# Patient Record
Sex: FEMALE | Race: WHITE | URBAN - METROPOLITAN AREA
[De-identification: names, ages, dates, MRNs, and addresses within clinical notes are randomized per-mention and may not be internally consistent; named-entity substitution may affect disease eponyms.]

---

## 2018-11-20 ENCOUNTER — OUTPATIENT (OUTPATIENT)
Dept: OUTPATIENT SERVICES | Facility: HOSPITAL | Age: 83
LOS: 1 days | Discharge: HOME | End: 2018-11-20

## 2018-11-20 ENCOUNTER — RESULT REVIEW (OUTPATIENT)
Age: 83
End: 2018-11-20

## 2018-11-20 VITALS — SYSTOLIC BLOOD PRESSURE: 156 MMHG | RESPIRATION RATE: 18 BRPM | HEART RATE: 98 BPM | DIASTOLIC BLOOD PRESSURE: 99 MMHG

## 2018-11-20 VITALS
DIASTOLIC BLOOD PRESSURE: 94 MMHG | RESPIRATION RATE: 18 BRPM | SYSTOLIC BLOOD PRESSURE: 128 MMHG | WEIGHT: 121.92 LBS | HEART RATE: 96 BPM | TEMPERATURE: 97 F | HEIGHT: 63 IN

## 2018-11-20 DIAGNOSIS — Z98.890 OTHER SPECIFIED POSTPROCEDURAL STATES: Chronic | ICD-10-CM

## 2018-11-20 DIAGNOSIS — Z90.49 ACQUIRED ABSENCE OF OTHER SPECIFIED PARTS OF DIGESTIVE TRACT: Chronic | ICD-10-CM

## 2018-11-20 NOTE — PRE-ANESTHESIA EVALUATION ADULT - NSANTHOSAYNRD_GEN_A_CORE
No. MARQUIS screening performed.  STOP BANG Legend: 0-2 = LOW Risk; 3-4 = INTERMEDIATE Risk; 5-8 = HIGH Risk

## 2018-11-20 NOTE — ASU DISCHARGE PLAN (ADULT/PEDIATRIC). - NOTIFY
Persistent Nausea and Vomiting/Excessive Diarrhea/Pain not relieved by Medications/Bleeding that does not stop/Fever greater than 101/Inability to Tolerate Liquids or Foods/Swelling that continues/Unable to Urinate

## 2018-11-21 LAB — SURGICAL PATHOLOGY STUDY: SIGNIFICANT CHANGE UP

## 2018-11-26 DIAGNOSIS — R63.4 ABNORMAL WEIGHT LOSS: ICD-10-CM

## 2018-11-26 DIAGNOSIS — I10 ESSENTIAL (PRIMARY) HYPERTENSION: ICD-10-CM

## 2018-11-26 DIAGNOSIS — K64.8 OTHER HEMORRHOIDS: ICD-10-CM

## 2018-11-26 DIAGNOSIS — K20.9 ESOPHAGITIS, UNSPECIFIED: ICD-10-CM

## 2018-11-26 DIAGNOSIS — I48.91 UNSPECIFIED ATRIAL FIBRILLATION: ICD-10-CM

## 2018-11-26 DIAGNOSIS — K64.4 RESIDUAL HEMORRHOIDAL SKIN TAGS: ICD-10-CM

## 2018-11-26 DIAGNOSIS — Z79.01 LONG TERM (CURRENT) USE OF ANTICOAGULANTS: ICD-10-CM

## 2018-11-26 DIAGNOSIS — K29.40 CHRONIC ATROPHIC GASTRITIS WITHOUT BLEEDING: ICD-10-CM

## 2018-11-26 DIAGNOSIS — K59.00 CONSTIPATION, UNSPECIFIED: ICD-10-CM

## 2018-11-26 DIAGNOSIS — F41.9 ANXIETY DISORDER, UNSPECIFIED: ICD-10-CM

## 2018-11-26 DIAGNOSIS — E03.9 HYPOTHYROIDISM, UNSPECIFIED: ICD-10-CM

## 2019-09-23 NOTE — ASU PREOP CHECKLIST - BSA (M2)
Problem: Stroke: Ischemic (Transient/Permanent)  Intervention: # Provide Stroke Education Packet within 24 hrs: supplement as needed  Stroke Education Packet provided within 24 hours of admission         1.57

## 2020-09-21 PROBLEM — F41.9 ANXIETY DISORDER, UNSPECIFIED: Chronic | Status: ACTIVE | Noted: 2018-11-20

## 2020-09-21 PROBLEM — E03.9 HYPOTHYROIDISM, UNSPECIFIED: Chronic | Status: ACTIVE | Noted: 2018-11-20

## 2020-09-21 PROBLEM — I10 ESSENTIAL (PRIMARY) HYPERTENSION: Chronic | Status: ACTIVE | Noted: 2018-11-20

## 2020-09-21 PROBLEM — I48.91 UNSPECIFIED ATRIAL FIBRILLATION: Chronic | Status: ACTIVE | Noted: 2018-11-20

## 2020-09-29 PROBLEM — Z00.00 ENCOUNTER FOR PREVENTIVE HEALTH EXAMINATION: Status: ACTIVE | Noted: 2020-09-29

## 2020-11-09 ENCOUNTER — APPOINTMENT (OUTPATIENT)
Dept: NEUROLOGY | Facility: CLINIC | Age: 85
End: 2020-11-09

## 2022-10-15 ENCOUNTER — INPATIENT (INPATIENT)
Facility: HOSPITAL | Age: 87
LOS: 4 days | Discharge: HOME | End: 2022-10-20
Attending: STUDENT IN AN ORGANIZED HEALTH CARE EDUCATION/TRAINING PROGRAM | Admitting: STUDENT IN AN ORGANIZED HEALTH CARE EDUCATION/TRAINING PROGRAM

## 2022-10-15 VITALS
HEART RATE: 76 BPM | DIASTOLIC BLOOD PRESSURE: 84 MMHG | SYSTOLIC BLOOD PRESSURE: 167 MMHG | RESPIRATION RATE: 18 BRPM | HEIGHT: 63 IN | TEMPERATURE: 97 F | OXYGEN SATURATION: 97 %

## 2022-10-15 DIAGNOSIS — Z98.890 OTHER SPECIFIED POSTPROCEDURAL STATES: Chronic | ICD-10-CM
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
## 2022-10-15 DIAGNOSIS — E03.9 HYPOTHYROIDISM, UNSPECIFIED: ICD-10-CM

## 2022-10-15 DIAGNOSIS — I10 ESSENTIAL (PRIMARY) HYPERTENSION: ICD-10-CM

## 2022-10-15 DIAGNOSIS — G62.9 POLYNEUROPATHY, UNSPECIFIED: ICD-10-CM

## 2022-10-15 DIAGNOSIS — Z88.8 ALLERGY STATUS TO OTHER DRUGS, MEDICAMENTS AND BIOLOGICAL SUBSTANCES STATUS: ICD-10-CM

## 2022-10-15 DIAGNOSIS — Z90.49 ACQUIRED ABSENCE OF OTHER SPECIFIED PARTS OF DIGESTIVE TRACT: ICD-10-CM

## 2022-10-15 DIAGNOSIS — S22.41XA MULTIPLE FRACTURES OF RIBS, RIGHT SIDE, INITIAL ENCOUNTER FOR CLOSED FRACTURE: ICD-10-CM

## 2022-10-15 DIAGNOSIS — M17.0 BILATERAL PRIMARY OSTEOARTHRITIS OF KNEE: ICD-10-CM

## 2022-10-15 DIAGNOSIS — Z79.01 LONG TERM (CURRENT) USE OF ANTICOAGULANTS: ICD-10-CM
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
## 2022-10-15 DIAGNOSIS — F41.9 ANXIETY DISORDER, UNSPECIFIED: ICD-10-CM

## 2022-10-15 DIAGNOSIS — Z90.49 ACQUIRED ABSENCE OF OTHER SPECIFIED PARTS OF DIGESTIVE TRACT: Chronic | ICD-10-CM
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
## 2022-10-15 DIAGNOSIS — S06.6X1A TRAUMATIC SUBARACHNOID HEMORRHAGE WITH LOSS OF CONSCIOUSNESS OF 30 MINUTES OR LESS, INITIAL ENCOUNTER: ICD-10-CM

## 2022-10-15 DIAGNOSIS — E87.1 HYPO-OSMOLALITY AND HYPONATREMIA: ICD-10-CM

## 2022-10-15 DIAGNOSIS — I48.20 CHRONIC ATRIAL FIBRILLATION, UNSPECIFIED: ICD-10-CM

## 2022-10-15 DIAGNOSIS — D46.9 MYELODYSPLASTIC SYNDROME, UNSPECIFIED: ICD-10-CM

## 2022-10-15 DIAGNOSIS — Z79.890 HORMONE REPLACEMENT THERAPY: ICD-10-CM

## 2022-10-15 DIAGNOSIS — G50.0 TRIGEMINAL NEURALGIA: ICD-10-CM

## 2022-10-15 LAB
ALBUMIN SERPL ELPH-MCNC: 3.8 G/DL — SIGNIFICANT CHANGE UP (ref 3.5–5.2)
ALP SERPL-CCNC: 123 U/L — HIGH (ref 30–115)
ALT FLD-CCNC: 17 U/L — SIGNIFICANT CHANGE UP (ref 0–41)
ANION GAP SERPL CALC-SCNC: 13 MMOL/L — SIGNIFICANT CHANGE UP (ref 7–14)
APPEARANCE UR: ABNORMAL
APTT BLD: 29.6 SEC — SIGNIFICANT CHANGE UP (ref 27–39.2)
AST SERPL-CCNC: 43 U/L — HIGH (ref 0–41)
BACTERIA # UR AUTO: ABNORMAL
BASOPHILS # BLD AUTO: 0.02 K/UL — SIGNIFICANT CHANGE UP (ref 0–0.2)
BASOPHILS # BLD AUTO: 0.02 K/UL — SIGNIFICANT CHANGE UP (ref 0–0.2)
BASOPHILS NFR BLD AUTO: 1 % — SIGNIFICANT CHANGE UP (ref 0–1)
BASOPHILS NFR BLD AUTO: 1.1 % — HIGH (ref 0–1)
BILIRUB SERPL-MCNC: 0.8 MG/DL — SIGNIFICANT CHANGE UP (ref 0.2–1.2)
BILIRUB UR-MCNC: NEGATIVE — SIGNIFICANT CHANGE UP
BLD GP AB SCN SERPL QL: SIGNIFICANT CHANGE UP
BUN SERPL-MCNC: 6 MG/DL — LOW (ref 10–20)
CALCIUM SERPL-MCNC: 8.5 MG/DL — SIGNIFICANT CHANGE UP (ref 8.4–10.5)
CHLORIDE SERPL-SCNC: 80 MMOL/L — LOW (ref 98–110)
CO2 SERPL-SCNC: 27 MMOL/L — SIGNIFICANT CHANGE UP (ref 17–32)
COLOR SPEC: YELLOW — SIGNIFICANT CHANGE UP
CREAT SERPL-MCNC: <0.5 MG/DL — LOW (ref 0.7–1.5)
DIFF PNL FLD: ABNORMAL
EGFR: 97 ML/MIN/1.73M2 — SIGNIFICANT CHANGE UP
EOSINOPHIL # BLD AUTO: 0.03 K/UL — SIGNIFICANT CHANGE UP (ref 0–0.7)
EOSINOPHIL # BLD AUTO: 0.07 K/UL — SIGNIFICANT CHANGE UP (ref 0–0.7)
EOSINOPHIL NFR BLD AUTO: 1.6 % — SIGNIFICANT CHANGE UP (ref 0–8)
EOSINOPHIL NFR BLD AUTO: 3.5 % — SIGNIFICANT CHANGE UP (ref 0–8)
EPI CELLS # UR: 1 /HPF — SIGNIFICANT CHANGE UP (ref 0–5)
GLUCOSE SERPL-MCNC: 99 MG/DL — SIGNIFICANT CHANGE UP (ref 70–99)
GLUCOSE UR QL: NEGATIVE — SIGNIFICANT CHANGE UP
HCT VFR BLD CALC: 29.6 % — LOW (ref 37–47)
HCT VFR BLD CALC: 31.2 % — LOW (ref 37–47)
HGB BLD-MCNC: 10.8 G/DL — LOW (ref 12–16)
HGB BLD-MCNC: 11.3 G/DL — LOW (ref 12–16)
HYALINE CASTS # UR AUTO: 1 /LPF — SIGNIFICANT CHANGE UP (ref 0–7)
IMM GRANULOCYTES NFR BLD AUTO: 0.5 % — HIGH (ref 0.1–0.3)
IMM GRANULOCYTES NFR BLD AUTO: 1.1 % — HIGH (ref 0.1–0.3)
INR BLD: 2.14 RATIO — HIGH (ref 0.65–1.3)
INR BLD: 2.24 RATIO — HIGH (ref 0.65–1.3)
KETONES UR-MCNC: NEGATIVE — SIGNIFICANT CHANGE UP
LACTATE SERPL-SCNC: 0.7 MMOL/L — SIGNIFICANT CHANGE UP (ref 0.7–2)
LEUKOCYTE ESTERASE UR-ACNC: NEGATIVE — SIGNIFICANT CHANGE UP
LYMPHOCYTES # BLD AUTO: 0.48 K/UL — LOW (ref 1.2–3.4)
LYMPHOCYTES # BLD AUTO: 0.77 K/UL — LOW (ref 1.2–3.4)
LYMPHOCYTES # BLD AUTO: 26.2 % — SIGNIFICANT CHANGE UP (ref 20.5–51.1)
LYMPHOCYTES # BLD AUTO: 38.1 % — SIGNIFICANT CHANGE UP (ref 20.5–51.1)
MCHC RBC-ENTMCNC: 33.6 PG — HIGH (ref 27–31)
MCHC RBC-ENTMCNC: 33.6 PG — HIGH (ref 27–31)
MCHC RBC-ENTMCNC: 36.2 G/DL — SIGNIFICANT CHANGE UP (ref 32–37)
MCHC RBC-ENTMCNC: 36.5 G/DL — SIGNIFICANT CHANGE UP (ref 32–37)
MCV RBC AUTO: 92.2 FL — SIGNIFICANT CHANGE UP (ref 81–99)
MCV RBC AUTO: 92.9 FL — SIGNIFICANT CHANGE UP (ref 81–99)
MONOCYTES # BLD AUTO: 0.1 K/UL — SIGNIFICANT CHANGE UP (ref 0.1–0.6)
MONOCYTES # BLD AUTO: 0.12 K/UL — SIGNIFICANT CHANGE UP (ref 0.1–0.6)
MONOCYTES NFR BLD AUTO: 5.5 % — SIGNIFICANT CHANGE UP (ref 1.7–9.3)
MONOCYTES NFR BLD AUTO: 5.9 % — SIGNIFICANT CHANGE UP (ref 1.7–9.3)
NEUTROPHILS # BLD AUTO: 1.03 K/UL — LOW (ref 1.4–6.5)
NEUTROPHILS # BLD AUTO: 1.18 K/UL — LOW (ref 1.4–6.5)
NEUTROPHILS NFR BLD AUTO: 51 % — SIGNIFICANT CHANGE UP (ref 42.2–75.2)
NEUTROPHILS NFR BLD AUTO: 64.5 % — SIGNIFICANT CHANGE UP (ref 42.2–75.2)
NITRITE UR-MCNC: NEGATIVE — SIGNIFICANT CHANGE UP
NRBC # BLD: 0 /100 WBCS — SIGNIFICANT CHANGE UP (ref 0–0)
NRBC # BLD: 0 /100 WBCS — SIGNIFICANT CHANGE UP (ref 0–0)
PH UR: 7.5 — SIGNIFICANT CHANGE UP (ref 5–8)
PLATELET # BLD AUTO: 115 K/UL — LOW (ref 130–400)
PLATELET # BLD AUTO: 87 K/UL — LOW (ref 130–400)
POTASSIUM SERPL-MCNC: 4.7 MMOL/L — SIGNIFICANT CHANGE UP (ref 3.5–5)
POTASSIUM SERPL-SCNC: 4.7 MMOL/L — SIGNIFICANT CHANGE UP (ref 3.5–5)
PROT SERPL-MCNC: 6.4 G/DL — SIGNIFICANT CHANGE UP (ref 6–8)
PROT UR-MCNC: ABNORMAL
PROTHROM AB SERPL-ACNC: 25 SEC — HIGH (ref 9.95–12.87)
PROTHROM AB SERPL-ACNC: 26.2 SEC — HIGH (ref 9.95–12.87)
RBC # BLD: 3.21 M/UL — LOW (ref 4.2–5.4)
RBC # BLD: 3.36 M/UL — LOW (ref 4.2–5.4)
RBC # FLD: 13 % — SIGNIFICANT CHANGE UP (ref 11.5–14.5)
RBC # FLD: 13.2 % — SIGNIFICANT CHANGE UP (ref 11.5–14.5)
RBC CASTS # UR COMP ASSIST: 8 /HPF — HIGH (ref 0–4)
SARS-COV-2 RNA SPEC QL NAA+PROBE: SIGNIFICANT CHANGE UP
SODIUM SERPL-SCNC: 120 MMOL/L — LOW (ref 135–146)
SP GR SPEC: 1.01 — SIGNIFICANT CHANGE UP (ref 1.01–1.03)
TROPONIN T SERPL-MCNC: <0.01 NG/ML — SIGNIFICANT CHANGE UP
TROPONIN T SERPL-MCNC: <0.01 NG/ML — SIGNIFICANT CHANGE UP
UROBILINOGEN FLD QL: ABNORMAL
WBC # BLD: 1.83 K/UL — LOW (ref 4.8–10.8)
WBC # BLD: 2.02 K/UL — LOW (ref 4.8–10.8)
WBC # FLD AUTO: 1.83 K/UL — LOW (ref 4.8–10.8)
WBC # FLD AUTO: 2.02 K/UL — LOW (ref 4.8–10.8)
WBC UR QL: 5 /HPF — SIGNIFICANT CHANGE UP (ref 0–5)

## 2022-10-15 PROCEDURE — 70450 CT HEAD/BRAIN W/O DYE: CPT | Mod: 26,MA,59

## 2022-10-15 PROCEDURE — 93010 ELECTROCARDIOGRAM REPORT: CPT

## 2022-10-15 PROCEDURE — 72170 X-RAY EXAM OF PELVIS: CPT | Mod: 26

## 2022-10-15 PROCEDURE — 70496 CT ANGIOGRAPHY HEAD: CPT | Mod: 26

## 2022-10-15 PROCEDURE — 99283 EMERGENCY DEPT VISIT LOW MDM: CPT

## 2022-10-15 PROCEDURE — 71045 X-RAY EXAM CHEST 1 VIEW: CPT | Mod: 26

## 2022-10-15 PROCEDURE — 74177 CT ABD & PELVIS W/CONTRAST: CPT | Mod: 26,MA

## 2022-10-15 PROCEDURE — 93010 ELECTROCARDIOGRAM REPORT: CPT | Mod: 77

## 2022-10-15 PROCEDURE — 72125 CT NECK SPINE W/O DYE: CPT | Mod: 26,MA

## 2022-10-15 PROCEDURE — 70498 CT ANGIOGRAPHY NECK: CPT | Mod: 26

## 2022-10-15 PROCEDURE — 71260 CT THORAX DX C+: CPT | Mod: 26,MA

## 2022-10-15 PROCEDURE — 99285 EMERGENCY DEPT VISIT HI MDM: CPT

## 2022-10-15 PROCEDURE — 99291 CRITICAL CARE FIRST HOUR: CPT | Mod: 24,25

## 2022-10-15 RX ORDER — OXCARBAZEPINE 300 MG/1
150 TABLET, FILM COATED ORAL
Refills: 0 | Status: DISCONTINUED | OUTPATIENT
Start: 2022-10-15 | End: 2022-10-17

## 2022-10-15 RX ORDER — OXYBUTYNIN CHLORIDE 5 MG
1 TABLET ORAL
Qty: 0 | Refills: 0 | DISCHARGE

## 2022-10-15 RX ORDER — LOSARTAN/HYDROCHLOROTHIAZIDE 100MG-25MG
1 TABLET ORAL
Qty: 0 | Refills: 0 | DISCHARGE

## 2022-10-15 RX ORDER — ATORVASTATIN CALCIUM 80 MG/1
10 TABLET, FILM COATED ORAL AT BEDTIME
Refills: 0 | Status: DISCONTINUED | OUTPATIENT
Start: 2022-10-15 | End: 2022-10-20

## 2022-10-15 RX ORDER — CEFTRIAXONE 500 MG/1
1000 INJECTION, POWDER, FOR SOLUTION INTRAMUSCULAR; INTRAVENOUS ONCE
Refills: 0 | Status: COMPLETED | OUTPATIENT
Start: 2022-10-15 | End: 2022-10-15

## 2022-10-15 RX ORDER — SODIUM CHLORIDE 9 MG/ML
1000 INJECTION INTRAMUSCULAR; INTRAVENOUS; SUBCUTANEOUS
Refills: 0 | Status: DISCONTINUED | OUTPATIENT
Start: 2022-10-15 | End: 2022-10-16

## 2022-10-15 RX ORDER — GABAPENTIN 400 MG/1
200 CAPSULE ORAL EVERY 8 HOURS
Refills: 0 | Status: DISCONTINUED | OUTPATIENT
Start: 2022-10-15 | End: 2022-10-17

## 2022-10-15 RX ORDER — ACETAMINOPHEN 500 MG
650 TABLET ORAL ONCE
Refills: 0 | Status: COMPLETED | OUTPATIENT
Start: 2022-10-15 | End: 2022-10-15

## 2022-10-15 RX ORDER — PANTOPRAZOLE SODIUM 20 MG/1
40 TABLET, DELAYED RELEASE ORAL
Refills: 0 | Status: DISCONTINUED | OUTPATIENT
Start: 2022-10-15 | End: 2022-10-20

## 2022-10-15 RX ORDER — LOSARTAN POTASSIUM 100 MG/1
100 TABLET, FILM COATED ORAL DAILY
Refills: 0 | Status: DISCONTINUED | OUTPATIENT
Start: 2022-10-15 | End: 2022-10-18

## 2022-10-15 RX ORDER — ACETAMINOPHEN 500 MG
650 TABLET ORAL EVERY 6 HOURS
Refills: 0 | Status: DISCONTINUED | OUTPATIENT
Start: 2022-10-15 | End: 2022-10-20

## 2022-10-15 RX ORDER — HYDROCHLOROTHIAZIDE 25 MG
12.5 TABLET ORAL DAILY
Refills: 0 | Status: DISCONTINUED | OUTPATIENT
Start: 2022-10-15 | End: 2022-10-15

## 2022-10-15 RX ORDER — METOPROLOL TARTRATE 50 MG
25 TABLET ORAL DAILY
Refills: 0 | Status: DISCONTINUED | OUTPATIENT
Start: 2022-10-15 | End: 2022-10-15

## 2022-10-15 RX ORDER — LABETALOL HCL 100 MG
5 TABLET ORAL ONCE
Refills: 0 | Status: COMPLETED | OUTPATIENT
Start: 2022-10-15 | End: 2022-10-15

## 2022-10-15 RX ORDER — ONDANSETRON 8 MG/1
4 TABLET, FILM COATED ORAL ONCE
Refills: 0 | Status: COMPLETED | OUTPATIENT
Start: 2022-10-15 | End: 2022-10-15

## 2022-10-15 RX ORDER — DULOXETINE HYDROCHLORIDE 30 MG/1
30 CAPSULE, DELAYED RELEASE ORAL DAILY
Refills: 0 | Status: DISCONTINUED | OUTPATIENT
Start: 2022-10-15 | End: 2022-10-20

## 2022-10-15 RX ORDER — LEVETIRACETAM 250 MG/1
1000 TABLET, FILM COATED ORAL ONCE
Refills: 0 | Status: DISCONTINUED | OUTPATIENT
Start: 2022-10-15 | End: 2022-10-15

## 2022-10-15 RX ORDER — LEVOTHYROXINE SODIUM 125 MCG
50 TABLET ORAL DAILY
Refills: 0 | Status: DISCONTINUED | OUTPATIENT
Start: 2022-10-15 | End: 2022-10-20

## 2022-10-15 RX ORDER — SODIUM CHLORIDE 9 MG/ML
1000 INJECTION, SOLUTION INTRAVENOUS ONCE
Refills: 0 | Status: COMPLETED | OUTPATIENT
Start: 2022-10-15 | End: 2022-10-15

## 2022-10-15 RX ORDER — METOPROLOL TARTRATE 50 MG
25 TABLET ORAL EVERY 12 HOURS
Refills: 0 | Status: DISCONTINUED | OUTPATIENT
Start: 2022-10-15 | End: 2022-10-18

## 2022-10-15 RX ORDER — PROTHROMBIN COMPLEX CONCENTRATE (HUMAN) 25.5; 16.5; 24; 22; 22; 26 [IU]/ML; [IU]/ML; [IU]/ML; [IU]/ML; [IU]/ML; [IU]/ML
1500 POWDER, FOR SOLUTION INTRAVENOUS ONCE
Refills: 0 | Status: COMPLETED | OUTPATIENT
Start: 2022-10-15 | End: 2022-10-15

## 2022-10-15 RX ADMIN — DULOXETINE HYDROCHLORIDE 30 MILLIGRAM(S): 30 CAPSULE, DELAYED RELEASE ORAL at 15:44

## 2022-10-15 RX ADMIN — Medication 650 MILLIGRAM(S): at 18:31

## 2022-10-15 RX ADMIN — Medication 650 MILLIGRAM(S): at 10:46

## 2022-10-15 RX ADMIN — ONDANSETRON 4 MILLIGRAM(S): 8 TABLET, FILM COATED ORAL at 10:47

## 2022-10-15 RX ADMIN — Medication 650 MILLIGRAM(S): at 17:51

## 2022-10-15 RX ADMIN — Medication 25 MILLIGRAM(S): at 17:46

## 2022-10-15 RX ADMIN — SODIUM CHLORIDE 1000 MILLILITER(S): 9 INJECTION, SOLUTION INTRAVENOUS at 10:47

## 2022-10-15 RX ADMIN — CEFTRIAXONE 100 MILLIGRAM(S): 500 INJECTION, POWDER, FOR SOLUTION INTRAMUSCULAR; INTRAVENOUS at 15:43

## 2022-10-15 RX ADMIN — SODIUM CHLORIDE 50 MILLILITER(S): 9 INJECTION INTRAMUSCULAR; INTRAVENOUS; SUBCUTANEOUS at 17:46

## 2022-10-15 RX ADMIN — PROTHROMBIN COMPLEX CONCENTRATE (HUMAN) 400 INTERNATIONAL UNIT(S): 25.5; 16.5; 24; 22; 22; 26 POWDER, FOR SOLUTION INTRAVENOUS at 19:57

## 2022-10-15 RX ADMIN — OXCARBAZEPINE 150 MILLIGRAM(S): 300 TABLET, FILM COATED ORAL at 15:45

## 2022-10-15 RX ADMIN — ATORVASTATIN CALCIUM 10 MILLIGRAM(S): 80 TABLET, FILM COATED ORAL at 15:44

## 2022-10-15 RX ADMIN — GABAPENTIN 200 MILLIGRAM(S): 400 CAPSULE ORAL at 21:26

## 2022-10-15 RX ADMIN — LOSARTAN POTASSIUM 100 MILLIGRAM(S): 100 TABLET, FILM COATED ORAL at 15:44

## 2022-10-15 RX ADMIN — Medication 5 MILLIGRAM(S): at 22:08

## 2022-10-15 RX ADMIN — Medication 50 MICROGRAM(S): at 15:44

## 2022-10-15 NOTE — ED ADULT NURSE NOTE - OBJECTIVE STATEMENT
As per Family patient has been c/o generalized weakness since Wednesday, yesterday patient had fall landing in sitting postion and presents today c/o continued weakness Nausea and pain. Denied LOC and head trauma .

## 2022-10-15 NOTE — H&P ADULT - HISTORY OF PRESENT ILLNESS
Patient is a 86 y/o female with PMHx of MDS ( on Vidaza- gets every 28 days- just finished course- due in around 3 weeks) HTN, HLD, A-Fib ( On Coumadin 5mg daily), hypothyroid, neuropathy of B/L LE, Trigeminal Neuralgia ( Prior Gamma knife procedure for pain), B/L OA of the knees, prior CCY who presented to [facility] s/p fall at home. Patient was ambulating at home with the walker when she fell onto the right side. She doesn't have the best recollection of the event but denies LOC. She has a home health aide and her grandson was also home at the time. She was able to stand and walk but presented as notes ongoing dizziness and pain over her right chest. Pain over the right side is dull, worse with motion, but not sharp. She notes dizziness notable after fall but not prior. With the dizziness she gets occasional nausea. Prior to fall she denies any change in medications, new medications, recent illness, nor any other features out different from her baseline .

## 2022-10-15 NOTE — CONSULT NOTE ADULT - SUBJECTIVE AND OBJECTIVE BOX
SICU Consultation Note  ======================================================================================================  JANA ESCOBEDO  MRN-[id number]    87y Female            Procedure:  OR time:      EBL:          IV Fluids:       Blood Products:                UOP:      Procedure Findings-              PAST MEDICAL & SURGICAL HISTORY:  Atrial fibrillation  Hypertension  Hypothyroid  Anxiety  H/O colonoscopy  10 yrs ago  History of cholecystectomy         Home Medications:  atorvastatin 10 mg oral tablet: 1 tab(s) orally once a day (15 Oct 2022 14:38)  Cymbalta 30 mg oral delayed release capsule: 1 cap(s) orally 2 times a day (15 Oct 2022 14:38)  levothyroxine 50 mcg (0.05 mg) oral tablet: 1 tab(s) orally once a day (2018 11:17)  losartan-hydrochlorothiazide 100 mg-12.5 mg oral tablet: 1 tab(s) orally once a day (15 Oct 2022 14:37)  metoprolol tartrate 25 mg oral tablet: orally once a day (2018 11:17)  Myrbetriq 50 mg oral tablet, extended release: 1 tab(s) orally once a day (15 Oct 2022 14:37)  Trileptal 150 mg oral tablet: 1 tab(s) orally 2 times a day (15 Oct 2022 14:39)  warfarin 5 mg oral tablet: 1 tab(s) orally once a day (2018 11:17)      Allergies    Tegretol (Hives; Rash)    Intolerances    Advanced Directives: Full Code      CURRENT MEDICATIONS:   atorvastatin 10 milliGRAM(s) Oral at bedtime  DULoxetine 30 milliGRAM(s) Oral daily  hydrochlorothiazide 12.5 milliGRAM(s) Oral daily  levothyroxine 50 MICROGram(s) Oral daily  losartan 100 milliGRAM(s) Oral daily  metoprolol tartrate 25 milliGRAM(s) Oral every 12 hours  OXcarbazepine 150 milliGRAM(s) Oral two times a day      VITAL SIGNS, INS/OUTS (Last 24hours):    ICU Vital Signs Last 24 Hrs  T(C): 36.1 (15 Oct 2022 10:12), Max: 36.1 (15 Oct 2022 10:12)  T(F): 97 (15 Oct 2022 10:12), Max: 97 (15 Oct 2022 10:12)  HR: 76 (15 Oct 2022 10:12) (76 - 76)  BP: 167/84 (15 Oct 2022 10:12) (167/84 - 167/84)  BP(mean): --  ABP: --  ABP(mean): --  RR: 18 (15 Oct 2022 10:12) (18 - 18)  SpO2: 97% (15 Oct 2022 10:12) (97% - 97%)    O2 Parameters below as of 15 Oct 2022 10:12  Patient On (Oxygen Delivery Method): room air      I&O's Summary        Height (cm): 160 (10-15-22)    Physical Exam:  ---------------------------------------------------------------------------------------  RASS:   GCS:    E/M/V  Exam: A&Ox3, no focal deficits    RESPIRATORY:  Intubated/Tracheostomy  Lungs clear to auscultation b/l, Normal expansion/effort  Mechanical Ventilation:     CARDIOVASCULAR:   S1/S2.  RRR  No peripheral edema    GASTROINTESTINAL:  Abdomen soft, non-tender, non-distended, no wounds or discoloration  Colostomy/Ileostomy/NG/OG tube in place    MUSCULOSKELETAL:  Extremities warm, pink, well-perfused.  Palpable/Doppler pulses signals      DERM:  No skin breakdown     :   Exam: Mercedes catheter in place.       Tubes/Lines/Drains   ----------------------------------------------------------------------------------------------------------  [x] Peripheral IV  [] Central Venous Line    R/L        IJ/Femoral             Date Placed:    [] Arterial Line		   R/L         Radial/Femoral    Date Placed:   [] PICC:         	[] Midline		                                  Date Placed:   [] Urinary Catheter Mercedes                                             Date Placed:       LABS  --------------------------------------------------------------------------------------  LFTs  LIVER FUNCTIONS - ( 15 Oct 2022 10:46 )  Alb: 3.8 g/dL / Pro: 6.4 g/dL / ALK PHOS: 123 U/L / ALT: 17 U/L / AST: 43 U/L / GGT: x             Cardiac Markers  CARDIAC MARKERS ( 15 Oct 2022 10:46 )  x     / <0.01 ng/mL / x     / x     / x            Coagulation  PT/INR - ( 15 Oct 2022 10:46 )   PT: 25.00 sec;   INR: 2.14 ratio       PTT - ( 15 Oct 2022 10:46 )  PTT:29.6 sec        Blood Sugar  POCT Blood Glucose.: 105 mg/dL (15 Oct 2022 10:23)      Urinalysis  Urinalysis Basic - ( 15 Oct 2022 11:02 )    Color: Yellow / Appearance: Slightly Turbid / S.011 / pH: x  Gluc: x / Ketone: Negative  / Bili: Negative / Urobili: 3 mg/dL   Blood: x / Protein: 100 mg/dL / Nitrite: Negative   Leuk Esterase: Negative / RBC: 8 /HPF / WBC 5 /HPF   Sq Epi: x / Non Sq Epi: 1 /HPF / Bacteria: Many          CT/XRAY/ECHO/TCD/EEG  ----------------------------------------------------------------------------------------------  CT Head 10/15/2022  small acute SAH within the right quadrigeminal plate cistern    CT C-spine 10/15/2022  Multilevel degenerative changes no acute fracture    --------------------------------------------------------------------------------------  Admit Diagnosis: FALL; SUBARACHNOID HEMORRHAGE; RIB FRACTURES       Critical Care Diagnoses: s/p fall with acute SAH and right sided 7-10 rib fractures   SICU Consultation Note  ======================================================================================================  JANA ESCOBEDO  MRN-[id number]    87y Female with PMH of HTN. HLD, Hypothyroid disease, Afib (on warfarin), mild myelodysplastic syndrome (on Vizada Q 28 days- last treatment 1 week ago), Trigeminal Neuralgia ( on Trileptal, prior gamma knife procedure), B/L knee Osteoarthritis- walker device dependent was brought in by EMS after an unwitnessed fall at home. Pt reports walking to the kitchen using her walker and felt her knee buckle.  She reports falling on her back, denies hitting her head. However, family reports pt was mildly confused that lasted seconds.  Pt arrived with GCS 15, no obvious signs of trauma. Primary survey was intact. Imaging showed a small acute SAH within the right quadrigeminal plate cistern  CT spine negative for acute findings, but shows multilevel degenerative changes. Pt was evaluated by NSGY team recommends repeat Head CT- now, Keppra for seizure ppx, and Q1h neuro checks.    PAST MEDICAL & SURGICAL HISTORY:  Atrial fibrillation  Hypertension  Hypothyroid  Anxiety  H/O colonoscopy  10 yrs ago  History of cholecystectomy       Home Medications:  atorvastatin 10 mg oral tablet: 1 tab(s) orally once a day (15 Oct 2022 14:38)  Cymbalta 30 mg oral delayed release capsule: 1 cap(s) orally 2 times a day (15 Oct 2022 14:38)  levothyroxine 50 mcg (0.05 mg) oral tablet: 1 tab(s) orally once a day (2018 11:17)  losartan-hydrochlorothiazide 100 mg-12.5 mg oral tablet: 1 tab(s) orally once a day (15 Oct 2022 14:37)  metoprolol tartrate 25 mg oral tablet: orally once a day (2018 11:17)  Myrbetriq 50 mg oral tablet, extended release: 1 tab(s) orally once a day (15 Oct 2022 14:37)  Trileptal 150 mg oral tablet: 1 tab(s) orally 2 times a day (15 Oct 2022 14:39)  warfarin 5 mg oral tablet: 1 tab(s) orally once a day (2018 11:17)      Allergies:  Tegretol (Hives; Rash)  Intolerances: none    Advanced Directives: Full Code      CURRENT MEDICATIONS:   atorvastatin 10 milliGRAM(s) Oral at bedtime  DULoxetine 30 milliGRAM(s) Oral daily  hydrochlorothiazide 12.5 milliGRAM(s) Oral daily  levothyroxine 50 MICROGram(s) Oral daily  losartan 100 milliGRAM(s) Oral daily  metoprolol tartrate 25 milliGRAM(s) Oral every 12 hours  OXcarbazepine 150 milliGRAM(s) Oral two times a day      VITAL SIGNS, INS/OUTS (Last 24hours):    ICU Vital Signs Last 24 Hrs  T(C): 36.1 (15 Oct 2022 10:12), Max: 36.1 (15 Oct 2022 10:12)  T(F): 97 (15 Oct 2022 10:12), Max: 97 (15 Oct 2022 10:12)  HR: 76 (15 Oct 2022 10:12) (76 - 76)  BP: 167/84 (15 Oct 2022 10:12) (167/84 - 167/84)  RR: 18 (15 Oct 2022 10:12) (18 - 18)  SpO2: 97% (15 Oct 2022 10:12) (97% - 97%)    O2 Parameters below as of 15 Oct 2022 10:12  Patient On (Oxygen Delivery Method): room air      Height (cm): 160 (10-15-22)    Physical Exam: at 16:15  ---------------------------------------------------------------------------------------  Neuro- alert and oriented x 3, pupils 3 mm and reactive, GCS 15, no focal deficits  Resp- lungs are clear, no crackles, no wheezing, right sided reproducible chest pain  Cardiac- S1, S2, irregular rhythm  Abd- soft, NT, ND, + bowel sounds  MSK- ROM x 4, strength 5/5 throughout  - no cantrell      Tubes/Lines/Drains   ----------------------------------------------------------------------------------------------------------  [x] Peripheral IV  [] Central Venous Line    R/L        IJ/Femoral             Date Placed:    [] Arterial Line		   R/L         Radial/Femoral    Date Placed:   [] PICC:         	[] Midline		                                  Date Placed:   [] Urinary Catheter Cantrell                                             Date Placed:       LABS  --------------------------------------------------------------------------------------  LFTs  LIVER FUNCTIONS - ( 15 Oct 2022 10:46 )  Alb: 3.8 g/dL / Pro: 6.4 g/dL / ALK PHOS: 123 U/L / ALT: 17 U/L / AST: 43 U/L / GGT: x             Cardiac Markers  CARDIAC MARKERS ( 15 Oct 2022 10:46 )  x     / <0.01 ng/mL / x     / x     / x            Coagulation  PT/INR - ( 15 Oct 2022 10:46 )   PT: 25.00 sec;   INR: 2.14 ratio       PTT - ( 15 Oct 2022 10:46 )  PTT:29.6 sec        Blood Sugar  POCT Blood Glucose.: 105 mg/dL (15 Oct 2022 10:23)      Urinalysis  Urinalysis Basic - ( 15 Oct 2022 11:02 )    Color: Yellow / Appearance: Slightly Turbid / S.011 / pH: x  Gluc: x / Ketone: Negative  / Bili: Negative / Urobili: 3 mg/dL   Blood: x / Protein: 100 mg/dL / Nitrite: Negative   Leuk Esterase: Negative / RBC: 8 /HPF / WBC 5 /HPF   Sq Epi: x / Non Sq Epi: 1 /HPF / Bacteria: Many        CT/XRAY/ECHO/TCD/EEG  ----------------------------------------------------------------------------------------------  CT Head 10/15/2022  small acute SAH within the right quadrigeminal plate cistern    CT C-spine 10/15/2022  Multilevel degenerative changes no acute fracture    --------------------------------------------------------------------------------------  Admit Diagnosis: FALL; SUBARACHNOID HEMORRHAGE; RIB FRACTURES       Critical Care Diagnoses: s/p fall with acute SAH and right sided 7-10 rib fractures   SICU Consultation Note  ======================================================================================================  JANA ESCOBEDO  MRN-[id number]    87y Female with PMH of HTN. HLD, Hypothyroid disease, Afib (on warfarin), mild myelodysplastic syndrome (on Vizada Q 28 days- last treatment 1 week ago), Trigeminal Neuralgia ( on Trileptal, prior gamma knife procedure), B/L knee Osteoarthritis- walker device dependent was brought in by EMS after an unwitnessed fall at home. Pt reports walking to the kitchen using her walker and felt her knee buckle.  She reports falling on her back, denies hitting her head. However, family reports pt was mildly confused that lasted seconds.  Pt arrived with GCS 15, no obvious signs of trauma. Primary survey was intact. Imaging showed a small acute SAH within the right quadrigeminal plate cistern.  CT C-spine negative for acute findings, but shows multilevel degenerative changes., and CT chest shows right minimally displaced acute 7-10 rib fx.   Pt was evaluated by NSGY team who recommends repeat  CT Head and Neck,  seizure ppx, and Q1h neuro checks.      PAST MEDICAL & SURGICAL HISTORY:  Atrial fibrillation  Hypertension  Hypothyroid  Anxiety  H/O colonoscopy  10 yrs ago  History of cholecystectomy       Home Medications:  atorvastatin 10 mg oral tablet: 1 tab(s) orally once a day (15 Oct 2022 14:38)  Cymbalta 30 mg oral delayed release capsule: 1 cap(s) orally 2 times a day (15 Oct 2022 14:38)  levothyroxine 50 mcg (0.05 mg) oral tablet: 1 tab(s) orally once a day (2018 11:17)  losartan-hydrochlorothiazide 100 mg-12.5 mg oral tablet: 1 tab(s) orally once a day (15 Oct 2022 14:37)  metoprolol tartrate 25 mg oral tablet: orally once a day (2018 11:17)  Myrbetriq 50 mg oral tablet, extended release: 1 tab(s) orally once a day (15 Oct 2022 14:37)  Trileptal 150 mg oral tablet: 1 tab(s) orally 2 times a day (15 Oct 2022 14:39)  warfarin 5 mg oral tablet: 1 tab(s) orally once a day (2018 11:17)      Allergies:  Tegretol (Hives; Rash)  Intolerances: none    Advanced Directives: Full Code      CURRENT MEDICATIONS:   atorvastatin 10 milliGRAM(s) Oral at bedtime  DULoxetine 30 milliGRAM(s) Oral daily  hydrochlorothiazide 12.5 milliGRAM(s) Oral daily  levothyroxine 50 MICROGram(s) Oral daily  losartan 100 milliGRAM(s) Oral daily  metoprolol tartrate 25 milliGRAM(s) Oral every 12 hours  OXcarbazepine 150 milliGRAM(s) Oral two times a day      VITAL SIGNS, INS/OUTS (Last 24hours):    ICU Vital Signs Last 24 Hrs  T(C): 36.1 (15 Oct 2022 10:12), Max: 36.1 (15 Oct 2022 10:12)  T(F): 97 (15 Oct 2022 10:12), Max: 97 (15 Oct 2022 10:12)  HR: 76 (15 Oct 2022 10:12) (76 - 76)  BP: 167/84 (15 Oct 2022 10:12) (167/84 - 167/84)  RR: 18 (15 Oct 2022 10:12) (18 - 18)  SpO2: 97% (15 Oct 2022 10:12) (97% - 97%)    O2 Parameters below as of 15 Oct 2022 10:12  Patient On (Oxygen Delivery Method): room air      Height (cm): 160 (10-15-)    Physical Exam: at 16:15  ---------------------------------------------------------------------------------------  Neuro- alert and oriented x 3, pupils 3 mm and reactive, GCS 15, no focal deficits  Resp- lungs are clear, no crackles, no wheezing, right sided reproducible chest pain  Cardiac- S1, S2, irregular rhythm  Abd- soft, NT, ND, + bowel sounds  MSK- ROM x 4, strength 5/5 throughout  - no cantrell      Tubes/Lines/Drains   ----------------------------------------------------------------------------------------------------------  [x] Peripheral IV  [] Central Venous Line    R/L        IJ/Femoral             Date Placed:    [] Arterial Line		   R/L         Radial/Femoral    Date Placed:   [] PICC:         	[] Midline		                                  Date Placed:   [] Urinary Catheter Cantrell                                             Date Placed:       LABS  --------------------------------------------------------------------------------------  LFTs  LIVER FUNCTIONS - ( 15 Oct 2022 10:46 )  Alb: 3.8 g/dL / Pro: 6.4 g/dL / ALK PHOS: 123 U/L / ALT: 17 U/L / AST: 43 U/L / GGT: x             Cardiac Markers  CARDIAC MARKERS ( 15 Oct 2022 10:46 )  x     / <0.01 ng/mL / x     / x     / x            Coagulation  PT/INR - ( 15 Oct 2022 10:46 )   PT: 25.00 sec;   INR: 2.14 ratio       PTT - ( 15 Oct 2022 10:46 )  PTT:29.6 sec        Blood Sugar  POCT Blood Glucose.: 105 mg/dL (15 Oct 2022 10:23)      Urinalysis  Urinalysis Basic - ( 15 Oct 2022 11:02 )    Color: Yellow / Appearance: Slightly Turbid / S.011 / pH: x  Gluc: x / Ketone: Negative  / Bili: Negative / Urobili: 3 mg/dL   Blood: x / Protein: 100 mg/dL / Nitrite: Negative   Leuk Esterase: Negative / RBC: 8 /HPF / WBC 5 /HPF   Sq Epi: x / Non Sq Epi: 1 /HPF / Bacteria: Many        CT/XRAY/ECHO/TCD/EEG  ----------------------------------------------------------------------------------------------  CT Head 10/15/2022  small acute SAH within the right quadrigeminal plate cistern    CT C-spine 10/15/2022  Multilevel degenerative changes no acute fracture    --------------------------------------------------------------------------------------  Admit Diagnosis: FALL; SUBARACHNOID HEMORRHAGE; RIB FRACTURES       Critical Care Diagnoses: s/p fall with acute SAH and right sided 7-10 rib fractures

## 2022-10-15 NOTE — H&P ADULT - NSHPPHYSICALEXAM_GEN_ALL_CORE
Vital Signs Last 24 Hrs  T(C): 36.1 (15 Oct 2022 10:12), Max: 36.1 (15 Oct 2022 10:12)  T(F): 97 (15 Oct 2022 10:12), Max: 97 (15 Oct 2022 10:12)  HR: 76 (15 Oct 2022 10:12) (76 - 76)  BP: 167/84 (15 Oct 2022 10:12) (167/84 - 167/84)  BP(mean): --  RR: 18 (15 Oct 2022 10:12) (18 - 18)  SpO2: 97% (15 Oct 2022 10:12) (97% - 97%)    Parameters below as of 15 Oct 2022 10:12  Patient On (Oxygen Delivery Method): room air    Physical Exam  Gen: NAD  HEENT: NC/AT, Mucosal Membranes Dry  Cardio: S1/S2 No S3/S4, Regular  Resp: CTA B/L, some tenderness over right chest, no significant bruising   Abdomen: Soft, ND/NT  Neuro: AAOx3, Cranial Nerve II-XII intact   Extremities: FROM x 4, no pont tenderness over spine

## 2022-10-15 NOTE — H&P ADULT - ATTENDING COMMENTS
Seen and examined at 1520pm this afternoon.   86 yo female s/p mechanical fall.  Hx of MDS, afib on coumadin/coagulopathy 2/2 coumadin with INR at 2, HTN and addtl history as listed above  On exam she is AOx3 and in NAD  afib  nonlabored breathing, not on oxygen- pulling only 500 initially on IS  Abd soft and nontender    Labs reviewed WBC low due to MDS, INR 2.1  I personally reviewed all the CT imaging and injuries are: R rib fx 7-10 and small SAH    A/P 86 yo female FFS on coumadin with R fib fx 7-10, SAH  NSG consult  Aggressive pulmonary toilet  Admit to SICU due to age/SAH on anticoagulation/ and need for aggressive pulm toilet, monitoring with 4 rib fx    Alisa Esquivel MD

## 2022-10-15 NOTE — ED PROVIDER NOTE - CLINICAL SUMMARY MEDICAL DECISION MAKING FREE TEXT BOX
Patient signed out to me by Dr. Drew.  Patient with fall yesterday.  Had episode of syncope as well.  Work-up revealed traumatic subarachnoid hemorrhage, GCS 15.  No focal neurological deficits.  Also with multiple right-sided rib fractures.  No pneumothorax.  Also found to be significantly hyponatremic to 120.  Will fluid restrict right now.  Clinically does not need hypertonic saline.  Discussed with trauma and admitted to trauma/SICU.  Approved by Dr. Odonnell.

## 2022-10-15 NOTE — ED PROVIDER NOTE - PROGRESS NOTE DETAILS
Rajendra: Pt endorsed to Dr. Lares SS Made aware of CT findings - updated patient and daughter. Trauma consult placed - pending evaluation. ss Neurosurg consulted - will evaluate. Rec CTA and keppra, will follow.

## 2022-10-15 NOTE — ED PROVIDER NOTE - ATTENDING CONTRIBUTION TO CARE
86 y/o F pmh Afib on coumadin, MDS, HTN, here for eval unwitnessed fall yesterday. Pt was walking as per NL w/ walker in kitchen, felt R knee buckle (similar to past), then fell. thinks landed on buttocks. Family/ aide heard noise, came to pt, found her seated on floor, unresponsive "like she was asleep" for 10s, then aroused.     did not come to ED yesterday, but awoke w/ dizziness so came to day.  No HA, neck pain, back pain, focal neuro deficit. No cp, sob. No vomiting/ diarhea.  No bloody stool or hematuria. No prior hx syncope.    Agree w/ exam above.   + ttp R rib w/o sign trauma, + small eccymosis L back; o/w Primary survey neg, GCS15.  Exam o.w as noted.    IMP: Fall, consider syncope.  P: panscan, xray imaging, ekg, labs, ivf, analgesia, reassess.

## 2022-10-15 NOTE — CONSULT NOTE ADULT - ASSESSMENT
Assessment & Plan    87y Female  s/p     NEURO:     Pain-controlled with     RESP:       CARDS:       GI/NUTR:         /RENAL:   -Cr     HEME/ONC:   -H/H 11.3 g/dL (10-15-22)  31.2 % (10-15-22)      ID:     WBC Count: 1.83 (10-15 @ 10:46)                ENDO:  -Glu 99 mg/dL (10-15 @ 10:46)  105 mg/dL (10-15 @ 10:23)      LINES/DRAINS: Geena READ Foley     DISPO: SICU  Assessment & Plan    87y Female with PMH of HTN. HLD, Hypothyroid disease, Afib (on warfarin), mild myelodysplastic syndrome (on Vizada Q 28 days- last treatment 1 week ago), Trigeminal Neuralgia ( on Trileptal, prior gamma knife procedure), B/L knee Osteoarthritis- walker device dependent was brought in by EMS after an unwitnessed fall at home. Imaging shows small acute SAH within the right quadrigeminal plate cistern and right 7-10 rib fx.     NEURO:   # Acute SAH  - Rpt Head CTA Head and Neck   - Q1h Neuro checks  - Keppra for Sz ppx    #Pain-controlled     with Tylenol     RESP:   # right rib fx   - daily CXR  - incentive spirometry  - pain control      CARDS:   # A-fib, rate controlled  - con't Metoprolol 25 Q12h  -  home coumadin- held  - INR 2.1- reverse with Kcentra due to intra-cranial bleed    # PMH of HTN  - con't home regimen, Metoprolol and Losartan/HCTZ    # PMH HLD  - con't Lipitor    Trop negative x 1, f/u 2 more sets      GI/NUTR:   No acute issues  NPO except meds for now      /RENAL:   # Hyponatremia  Na+ 120- start NS @ 50  Q6h Na+ monitoring  Consider salt tabs    Voiding spontaneously  BUN/Cr- 6/0.5  Electrolytes: Na 120// K 4.7//     HEME/ONC:   # PMH of Myelodysplastic syndrome  - receives Vizada Q28 days, last treatment 1 week ago    # Leukopenia- WBC 1.83  - likely secondary to MDS  - Hematology consult for possible Neupogen    Labs: H/H: 11/31      ID:   Afebrile, WBC 1.83  No signs of infection  UA- negative  will follow with Hematology for low WBC      ENDO:  No acute issues  -Glu 99 mg/dL (10-15 @ 10:46)  105 mg/dL (10-15 @ 10:23)  - Q6h FS while NPO      LINES/DRAINS: PIV,     DISPO: SICU     Discussed with Dr. Odonnell Assessment & Plan    87y Female with PMH of HTN. HLD, Hypothyroid disease, Afib (on warfarin), mild myelodysplastic syndrome (on Vizada Q 28 days- last treatment 1 week ago), Trigeminal Neuralgia ( on Trileptal, prior gamma knife procedure), B/L knee Osteoarthritis- walker device dependent was brought in by EMS after an unwitnessed fall at home. Imaging shows small acute SAH within the right quadrigeminal plate cistern and right 7-10 rib fx.     NEURO:   # Acute SAH  - Rpt Head CTA Head and Neck   - Q1h Neuro checks  - Keppra for Sz ppx    # PMH Trigeminal Neuralgia  - On Trileptal at home- con't???    # PMH Neuropathy  - con't Cymbalta    #Pain-controlled     with Tylenol     RESP:   # right rib fx   - daily CXR  - incentive spirometry  - pain control      CARDS:   # A-fib, rate controlled  - con't Metoprolol 25 Q12h  -  home coumadin- held  - INR 2.1- reverse with Kcentra due to intra-cranial bleed    # PMH of HTN  - con't home regimen, Metoprolol and Losartan/HCTZ    # PMH HLD  - con't Lipitor    Trop negative x 1, f/u 2 more sets      GI/NUTR:   No acute issues  -NPO except meds for now      /RENAL:   # Hyponatremia  -Na+ 120- start NS @ 50  -Q6h Na+ monitoring  -Consider salt tabs    Voiding spontaneously  -BUN/Cr- 6/0.5  -Electrolytes: Na 120// K 4.7//     HEME/ONC:   # PMH of Myelodysplastic syndrome  - receives Vizada Q28 days, last treatment 1 week ago    # Leukopenia- WBC 1.83  - likely secondary to MDS  - Hematology consult for possible Neupogen    Labs: H/H: 11/31      ID:   Afebrile, WBC 1.83  -No signs of infection  -UA- negative  -will follow with Hematology for low WBC      ENDO:  No acute issues  -Glu 99 mg/dL (10-15 @ 10:46)  -105 mg/dL (10-15 @ 10:23)  - Q6h FS while NPO      LINES/DRAINS: PIV,     DISPO: SICU     Discussed with Dr. Odonnell Assessment & Plan    87y Female with PMH of HTN. HLD, Hypothyroid disease, Afib (on warfarin), mild myelodysplastic syndrome (on Vizada Q 28 days- last treatment 1 week ago), Trigeminal Neuralgia ( on Trileptal, prior gamma knife procedure), B/L knee Osteoarthritis- walker device dependent was brought in by EMS after an unwitnessed fall at home. Imaging shows small acute SAH within the right quadrigeminal plate cistern and right 7-10 rib fx.     NEURO:   # Acute SAH  - Rpt Head CTA Head and Neck   - Q1h Neuro checks  - Keppra vs home Trileptal for Sz ppx    # PMH Trigeminal Neuralgia  - On Trileptal at home    # PMH Neuropathy  - con't Cymbalta    #Pain-controlled     with Tylenol     RESP:   # right rib fx   - daily CXR  - incentive spirometry  - pain control      CARDS:   # A-fib, rate controlled  - con't Metoprolol 25 Q12h  -  home coumadin- held  - INR 2.1- reverse with Kcentra due to intra-cranial bleed  - Rpt EKG    # PMH of HTN  - con't home regimen, Metoprolol and Losartan/HCTZ    # PMH HLD  - con't Lipitor    Trop negative x 1, f/u 2 more sets      GI/NUTR:   No acute issues  -NPO except meds for now      /RENAL:   # Hyponatremia  -Na+ 120- start NS @ 50  -Q6h Na+ monitoring  -Consider salt tabs    Voiding spontaneously  -BUN/Cr- 6/0.5  -Electrolytes: Na 120// K 4.7//     HEME/ONC:   # PMH of Myelodysplastic syndrome  - receives Vizada Q28 days, last treatment 1 week ago    # Leukopenia- WBC 1.83  - likely secondary to MDS  - Hematology consult for possible Neupogen    Labs: H/H: 11/31      ID:   Afebrile, WBC 1.83  -No signs of infection  -UA- negative  -will follow with Hematology for low WBC      ENDO:  No acute issues  -Glu 99 mg/dL (10-15 @ 10:46)  -105 mg/dL (10-15 @ 10:23)  - Q6h FS while NPO    DVT ppx:   chemo ppx held due to intra-cranial bleed  SCDs to lower ext      GI ppx:  Protonix      LINES/DRAINS: PIV,     DISPO: SICU     Discussed with Dr. Odonnell Assessment & Plan    87y Female with PMH of HTN. HLD, Hypothyroid disease, Afib (on warfarin- last dose last night), mild myelodysplastic syndrome (on Vizada Q 28 days- last treatment 1 week ago), Trigeminal Neuralgia ( on Trileptal, prior gamma knife procedure), B/L knee Osteoarthritis- walker device dependent was brought in by EMS after an unwitnessed fall at home. Imaging shows small acute SAH within the right quadrigeminal plate cistern and right 7-10 rib fx.     NEURO:   # Acute SAH  - Rpt Head CTA Head and Neck   - Q1h Neuro checks  - Keppra vs home Trileptal for Sz ppx    # PMH Trigeminal Neuralgia  - On Trileptal at home    # PMH Neuropathy  - con't Cymbalta    #Pain-controlled     with Tylenol     RESP:   # right rib fx   - daily CXR  - incentive spirometry  - pain control      CARDS:   # A-fib, rate controlled  - con't Metoprolol 25 Q12h  -  home coumadin- held  - INR 2.1- reverse with Kcentra due to intra-cranial bleed  - Rpt EKG    # PMH of HTN  - con't home regimen, Metoprolol and Losartan/HCTZ    # PMH HLD  - con't Lipitor    Trop negative x 1, f/u 2 more sets      GI/NUTR:   No acute issues  -NPO except meds for now      /RENAL:   # Hyponatremia  -Na+ 120- start NS @ 50  -Q6h Na+ monitoring  -Consider salt tabs    Voiding spontaneously  -BUN/Cr- 6/0.5  -Electrolytes: Na 120// K 4.7//     HEME/ONC:   # PMH of Myelodysplastic syndrome  - receives Vizada Q28 days, last treatment 1 week ago    # Leukopenia- WBC 1.83  - likely secondary to MDS  - Hematology consult for possible Neupogen    Labs: H/H: 11/31      ID:   Afebrile, WBC 1.83  -No signs of infection  -UA- negative  -will follow with Hematology for low WBC      ENDO:  # PMH Hypothyroidism  resume Synthroid 50 mcg QD    # Glucose monitoring  -Glu 99 mg/dL (10-15 @ 10:46)  -105 mg/dL (10-15 @ 10:23)  - Q6h FS while NPO    DVT ppx:   chemo ppx held due to intra-cranial bleed  SCDs to lower ext      GI ppx:  Protonix      LINES/DRAINS: PIV,     DISPO: SICU     Discussed with Dr. dOonnell Assessment & Plan    87y Female with PMH of HTN. HLD, Hypothyroid disease, Afib (on warfarin- last dose last night), mild myelodysplastic syndrome (on Vizada Q 28 days- last treatment 1 week ago), Trigeminal Neuralgia ( on Trileptal, prior gamma knife procedure), B/L knee Osteoarthritis- walker device dependent was brought in by EMS after an unwitnessed fall at home. Imaging shows small acute SAH within the right quadrigeminal plate cistern and right 7-10 rib fxs.     NEURO:   # Acute SAH  - Rpt Head CTA Head and Neck , f/u official read  - Q1h Neuro checks  - Keppra vs home Trileptal for Sz ppx    # PMH Trigeminal Neuralgia  - On Trileptal at home    # PMH Neuropathy  - con't Cymbalta    #Pain-controlled     with Tylenol and Gabapentin    RESP:   # right7-10 rib fxs   - daily CXR  - incentive spirometry  - pain control      CARDS:   # A-fib, rate controlled  - con't Metoprolol 25 Q12h  -  home coumadin- held  - INR 2.1- reverse with Kcentra due to intra-cranial bleed  - Rpt EKG    # PMH of HTN  - con't home regimen, Metoprolol and Losartan/HCTZ    # PMH HLD  - con't Lipitor    Trop negative x 1, f/u 2 more sets      GI/NUTR:   No acute issues  -NPO except meds for now      /RENAL:   # Hyponatremia  -Na+ 120- start NS @ 50  -Q6h Na+ monitoring  -Consider salt tabs    Voiding spontaneously  -BUN/Cr- 6/0.5  -Electrolytes: Na 120// K 4.7    HEME/ONC:   # PMH of Myelodysplastic syndrome  - receives Vizada Q28 days, last treatment 1 week ago    # Leukopenia- WBC 1.83  - likely secondary to MDS treatment  - Hematology consult for further recs    Labs: H/H: 11/31      ID:   Afebrile, WBC 1.83  -No signs of infection  -UA- negative  -will follow with Hematology for low WBC      ENDO:  # PMH Hypothyroidism  resume Synthroid 50 mcg QD    # Glucose monitoring  -Glu 99 mg/dL (10-15 @ 10:46)  -105 mg/dL (10-15 @ 10:23)  - Q6h FS while NPO    DVT ppx:   chemo ppx held due to intra-cranial bleed  SCDs to lower ext      GI ppx:  Protonix      LINES/DRAINS: PIV,     DISPO: SICU     Discussed with Dr. Odonnell

## 2022-10-15 NOTE — ED PROVIDER NOTE - OBJECTIVE STATEMENT
88 yo h ho afib on warfarin, htn, MDS on monthly treatment presents sp fall yesterday. As per pt her RT knee buckled and fell on to her RT side yday, denies head trauma, LOC. Family/aide came immediately to pt and she was sitting upright. As per daughter at bedside, pt was initially awake/alert but became unresponsive "sleepy" for about 10 seconds after family came to her. Today pt c/o dizziness in AM, +RT flank/lateral rib pain. Denies cp, sob, abd pain, vision change, nvd, presyncope   Last took warfarin last night

## 2022-10-15 NOTE — ED PROVIDER NOTE - INTERPRETATION
Irregularly irregular, atrial fibrillation, normal axis, no ST segment depression or elevation, normal intervals otherwise.

## 2022-10-15 NOTE — H&P ADULT - ASSESSMENT
Patient is a 86 y/o female with PMHx of MDS ( on Vidaza- gets every 28 days- just finished course- due in around 3 weeks) HTN, HLD, A-Fib ( On Coumadin 5mg daily), hypothyroid, neuropathy of B/L LE, Trigeminal Neuralgia ( Prior Gamma knife procedure for pain), B/L OA of the knees, prior CCY who presented to [facility] s/p fall at home. Patient was ambulating at home with the walker when she fell onto the right side. Patient noted on workup to have right sided fracture of the 7-10th right ribs without displacement. On Incentive she can do around 500but without respiratory distress or hemodynamic instability. She was also noted on CT to have a small right sub arachnoid hemorrhage. Admitted to surgery and will have a SICU consult with possible SICU admission due to trauma burden.     S/P Fall unwitnessed on Coumadin  - Right rib fracture 7-10- Incentive spirometer given- 500 initially, encouraged hourly use  - Small right subarachnoid- no focal deficit on exam, baseline mentation- Will need repeat head CT- Neuro surgery evaluation   - Q 4 hour Neuro check  - On Trileptal for trigeminal Neuralgia- May require Keppra- Will defer to Neuro surgery    - PT/OT evaluation   - Serial CBC, maintain TnS    HTN  - Continue Home BP medications, Losartan  - Hold HCTZ for now as is dry    A-Fib  - Take coumadin 5mg daily- Hold for now given bleed   - Continue Metoprolol for rate control     HLD  - Continue Atorvastatin    Hypothyroid   - Continue Levothyroxine home dose      Pantoprazole for GI PPX Patient is a 86 y/o female with PMHx of MDS ( on Vidaza- gets every 28 days- just finished course- due in around 3 weeks) HTN, HLD, A-Fib ( On Coumadin 5mg daily), hypothyroid, neuropathy of B/L LE, Trigeminal Neuralgia ( Prior Gamma knife procedure for pain), B/L OA of the knees, prior CCY who presented to [facility] s/p fall at home. Patient was ambulating at home with the walker when she fell onto the right side. Patient noted on workup to have right sided fracture of the 7-10th right ribs without displacement. On Incentive she can do around 500but without respiratory distress or hemodynamic instability. She was also noted on CT to have a small right sub arachnoid hemorrhage. Admitted to surgery and will have a SICU consult with possible SICU admission due to trauma burden.     S/P Fall unwitnessed on Coumadin  - Right rib fracture 7-10- Incentive spirometer given- 500 initially, encouraged hourly use  - Small right subarachnoid- no focal deficit on exam, baseline mentation- Will need repeat head CT->Neuro surgery evaluation   - Neuro checks as per nsgy  - On Trileptal for trigeminal Neuralgia- May require Keppra- Will defer to Neuro surgery    - PT/OT evaluation   - Serial CBC, maintain TnS    HTN  - Continue Home BP medications, Losartan  - Hold HCTZ for now as is dry    A-Fib  - Take coumadin 5mg daily- Hold for now given bleed   - Continue Metoprolol for rate control     HLD  - Continue Atorvastatin    Hypothyroid   - Continue Levothyroxine home dose      Pantoprazole for GI PPX

## 2022-10-15 NOTE — H&P ADULT - NSHPLABSRESULTS_GEN_ALL_CORE
11.3   1.83  )-----------( 115      ( 15 Oct 2022 10:46 )             31.2     10-15    120<L>  |  80<L>  |  6<L>  ----------------------------<  99  4.7   |  27  |  <0.5<L>    Ca    8.5      15 Oct 2022 10:46    TPro  6.4  /  Alb  3.8  /  TBili  0.8  /  DBili  x   /  AST  43<H>  /  ALT  17  /  AlkPhos  123<H>  10-15    PT/INR - ( 15 Oct 2022 10:46 )   PT: 25.00 sec;   INR: 2.14 ratio         PTT - ( 15 Oct 2022 10:46 )  PTT:29.6 sec    Radiology:   Xray Pelvis AP only 10.15.22     Impression:  No evidence of acute fracture.      CT Abdomen and Pelvis w/ IV Cont 10.15.22     IMPRESSION:    1. Acute, minimally displaced right lateral 7-10th rib fractures; no   pneumothorax. Evaluation limited by motion.    2. No evidence of solid abdominal organ injury.      CT Cervical Spine No Cont 10.15.22     IMPRESSION:    1.  CT head: *Small acute subarachnoid hemorrhage within the right   quadrigeminal plate cistern.    2.  CT cervical spine: No evidence of acute fracture or subluxation.   Multilevel degenerative changes. 11.3   1.83  )-----------( 115      ( 15 Oct 2022 10:46 )             31.2     10-15  120<L>  |  80<L>  |  6<L>  ----------------------------<  99  4.7   |  27  |  <0.5<L>    Ca    8.5      15 Oct 2022 10:46    TPro  6.4  /  Alb  3.8  /  TBili  0.8  /  DBili  x   /  AST  43<H>  /  ALT  17  /  AlkPhos  123<H>  10-15    PT/INR - ( 15 Oct 2022 10:46 )   PT: 25.00 sec;   INR: 2.14 ratio     PTT - ( 15 Oct 2022 10:46 )  PTT:29.6 sec    < from: CT Head No Cont (10.15.22 @ 11:37) >  IMPRESSION:  1.  CT head: *Small acute subarachnoid hemorrhage within the right quadrigeminal plate cistern.  2.  CT cervical spine: No evidence of acute fracture or subluxation. Multilevel degenerative changes.  < end of copied text >    < from: CT Chest w/ IV Cont (10.15.22 @ 11:39) >  IMPRESSION:  1. Acute, minimally displaced right lateral 7-10th rib fractures; no pneumothorax. Evaluation limited by motion.  2. No evidence of solid abdominal organ injury.  < end of copied text >    < from: Xray Pelvis AP only (10.15.22 @ 11:56) >  Impression:  No evidence of acute fracture.  < end of copied text >    < from: CT Angio Head w/ IV Cont (10.15.22 @ 16:39) >  IMPRESSION:  1.  Previously observed subarachnoid hemorrhage in the right quadrigeminal plate is likely reflects venous plexus. Confirmation with MRI can be obtained as indicated.  2.  Mild atherosclerotic stenosis in bilateral cervical ICAs (up to 30%) and bilateral carotid siphons.  3.  Mild atherosclerotic stenosis in bilateral V4 vertebral arteries.

## 2022-10-15 NOTE — ED ADULT TRIAGE NOTE - CHIEF COMPLAINT QUOTE
as per daughter shes getting progressively more confused over the past few days. yesterday pt became dizzy and fell on her butt but when family tried to get her up she was not really responding. pt on warfarin did not hit head

## 2022-10-15 NOTE — H&P ADULT - NSHPSOCIALHISTORY_GEN_ALL_CORE
Social History  Denies Current Tobacco use  Denies Current ETOH use  Denies Current Illicit Drug use     Lives with daughter, has home health aide, ambulates with rolling walker

## 2022-10-15 NOTE — CONSULT NOTE ADULT - SUBJECTIVE AND OBJECTIVE BOX
HPI:  Patient is a 88 y/o female with PMHx of MDS ( on Vidaza- gets every 28 days- just finished course- due in around 3 weeks) HTN, HLD, A-Fib ( On Coumadin 5mg daily), hypothyroid, neuropathy of B/L LE, Trigeminal Neuralgia ( Prior Gamma knife procedure for pain), B/L OA of the knees, prior CCY who presented to [facility] s/p fall at home. Patient was ambulating at home with the walker when she fell onto the right side. She doesn't have the best recollection of the event but denies LOC. She has a home health aide and her grandson was also home at the time. She was able to stand and walk but presented as notes ongoing dizziness and pain over her right chest. Pain over the right side is dull, worse with motion, but not sharp. She notes dizziness notable after fall but not prior. With the dizziness she gets occasional nausea. Prior to fall she denies any change in medications, new medications, recent illness, nor any other features out different from her baseline .  (15 Oct 2022 14:26)          PAST MEDICAL & SURGICAL HISTORY:  Atrial fibrillation      Hypertension      Hypothyroid      Anxiety      H/O colonoscopy  10 yrs ago      History of cholecystectomy            HEALTH ISSUES - PROBLEM Dx:          FAMILY HISTORY:      Allergies    Tegretol (Hives; Rash)    Intolerances        REVIEW OF SYSTEMS : As listed in HPI  Head and Neck:   Cardio :   Pum :  GI:  Neuro:     MEDICATIONS  (STANDING):  atorvastatin 10 milliGRAM(s) Oral at bedtime  cefTRIAXone   IVPB 1000 milliGRAM(s) IV Intermittent once  DULoxetine 30 milliGRAM(s) Oral daily  hydrochlorothiazide 12.5 milliGRAM(s) Oral daily  levETIRAcetam  IVPB 1000 milliGRAM(s) IV Intermittent once  levothyroxine 50 MICROGram(s) Oral daily  losartan 100 milliGRAM(s) Oral daily  metoprolol tartrate 25 milliGRAM(s) Oral daily  OXcarbazepine 150 milliGRAM(s) Oral two times a day    MEDICATIONS  (PRN):      Anticoagulation:      Vital Signs Last 24 Hrs  T(C): 36.1 (15 Oct 2022 10:12), Max: 36.1 (15 Oct 2022 10:12)  T(F): 97 (15 Oct 2022 10:12), Max: 97 (15 Oct 2022 10:12)  HR: 76 (15 Oct 2022 10:12) (76 - 76)  BP: 167/84 (15 Oct 2022 10:12) (167/84 - 167/84)  BP(mean): --  RR: 18 (15 Oct 2022 10:12) (18 - 18)  SpO2: 97% (15 Oct 2022 10:12) (97% - 97%)    Parameters below as of 15 Oct 2022 10:12  Patient On (Oxygen Delivery Method): room air    Physical Exam :  General :   A&O x  Tongue midline  Facial features symmetric, No droop  Speech clear and appropriate, no slur   Pt speaking in full sentences   Follows all commands   Occular :   PERRLA, EOMI   Motor :   MAEx4   No spinal point tenderness   Sensory :  Intact bilaterally     Pronator Drift :     Drains / Devices :    LABS:                        11.3   1.83  )-----------( 115      ( 15 Oct 2022 10:46 )             31.2     10-15    120<L>  |  80<L>  |  6<L>  ----------------------------<  99  4.7   |  27  |  <0.5<L>    Ca    8.5      15 Oct 2022 10:46    TPro  6.4  /  Alb  3.8  /  TBili  0.8  /  DBili  x   /  AST  43<H>  /  ALT  17  /  AlkPhos  123<H>  10-15    PT/INR - ( 15 Oct 2022 10:46 )   PT: 25.00 sec;   INR: 2.14 ratio         PTT - ( 15 Oct 2022 10:46 )  PTT:29.6 sec    RADIOLOGY & ADDITIONAL STUDIES:  < from: CT Head No Cont (10.15.22 @ 11:37) >    IMPRESSION:    1.  CT head: *Small acute subarachnoid hemorrhage within the right   quadrigeminal plate cistern.    2.  CT cervical spine: No evidence of acute fracture or subluxation.   Multilevel degenerative changes.    *Updated report discussed with Dr. Drew 12:27 PM 10/15/2022.    --- End of Report ---    TERRI BAIG DO; Resident Radiologist  This document has been electronically signed.  ALEKSEY COLEMAN MD; Attending Radiologist  This document has been electronically signed. Oct 15 2022 12:34PM    < end of copied text >    Assessment / Plan: 87y F on coumadin BIBEMS from NH s/p fall ?HT ?LOC CTH shows small acute SAH.     - CTA Head and Neck (use as stability scan as well)  - Keppra / antiseizure ppx  - Hold coumadin INR 2.14   - Replete sodium   - Admitted to trauma going to SICU      HPI:  Patient is a 86 y/o female with PMHx of MDS ( on Vidaza- gets every 28 days- just finished course- due in around 3 weeks) HTN, HLD, A-Fib ( On Coumadin 5mg daily), hypothyroid, neuropathy of B/L LE, Trigeminal Neuralgia ( Prior Gamma knife procedure for pain), B/L OA of the knees, prior CCY who presented to [facility] s/p fall at home. Patient was ambulating at home with the walker when she fell onto the right side. She doesn't have the best recollection of the event but denies LOC. She has a home health aide and her grandson was also home at the time. She was able to stand and walk but presented as notes ongoing dizziness and pain over her right chest. Pain over the right side is dull, worse with motion, but not sharp. She notes dizziness notable after fall but not prior. With the dizziness she gets occasional nausea. Prior to fall she denies any change in medications, new medications, recent illness, nor any other features out different from her baseline .  (15 Oct 2022 14:26)      PAST MEDICAL & SURGICAL HISTORY:  Atrial fibrillation      Hypertension      Hypothyroid      Anxiety      H/O colonoscopy  10 yrs ago      History of cholecystectomy      HEALTH ISSUES - PROBLEM Dx:      FAMILY HISTORY:      Allergies    Tegretol (Hives; Rash)    Intolerances        REVIEW OF SYSTEMS : As listed in HPI  Head and Neck:   Cardio :   Pum :  GI:  Neuro:     MEDICATIONS  (STANDING):  atorvastatin 10 milliGRAM(s) Oral at bedtime  cefTRIAXone   IVPB 1000 milliGRAM(s) IV Intermittent once  DULoxetine 30 milliGRAM(s) Oral daily  hydrochlorothiazide 12.5 milliGRAM(s) Oral daily  levETIRAcetam  IVPB 1000 milliGRAM(s) IV Intermittent once  levothyroxine 50 MICROGram(s) Oral daily  losartan 100 milliGRAM(s) Oral daily  metoprolol tartrate 25 milliGRAM(s) Oral daily  OXcarbazepine 150 milliGRAM(s) Oral two times a day    MEDICATIONS  (PRN):    Anticoagulation:    Vital Signs Last 24 Hrs  T(C): 36.1 (15 Oct 2022 10:12), Max: 36.1 (15 Oct 2022 10:12)  T(F): 97 (15 Oct 2022 10:12), Max: 97 (15 Oct 2022 10:12)  HR: 76 (15 Oct 2022 10:12) (76 - 76)  BP: 167/84 (15 Oct 2022 10:12) (167/84 - 167/84)  BP(mean): --  RR: 18 (15 Oct 2022 10:12) (18 - 18)  SpO2: 97% (15 Oct 2022 10:12) (97% - 97%)    Parameters below as of 15 Oct 2022 10:12  Patient On (Oxygen Delivery Method): room air    Physical Exam :  General :   A&O x 3  Tongue midline  Facial features symmetric, No droop  Speech clear and appropriate, no slur   Pt speaking in full sentences   Follows all commands   Occular :   PERRLA, EOMI   Motor :   MAEx4   No spinal point tenderness   Finger to nose intact   Sensory :  Intact bilaterally     Pronator Drift : no drift     LABS:                        11.3   1.83  )-----------( 115      ( 15 Oct 2022 10:46 )             31.2     10-15    120<L>  |  80<L>  |  6<L>  ----------------------------<  99  4.7   |  27  |  <0.5<L>    Ca    8.5      15 Oct 2022 10:46    TPro  6.4  /  Alb  3.8  /  TBili  0.8  /  DBili  x   /  AST  43<H>  /  ALT  17  /  AlkPhos  123<H>  10-15    PT/INR - ( 15 Oct 2022 10:46 )   PT: 25.00 sec;   INR: 2.14 ratio         PTT - ( 15 Oct 2022 10:46 )  PTT:29.6 sec    RADIOLOGY & ADDITIONAL STUDIES:  < from: CT Head No Cont (10.15.22 @ 11:37) >    IMPRESSION:    1.  CT head: *Small acute subarachnoid hemorrhage within the right   quadrigeminal plate cistern.    2.  CT cervical spine: No evidence of acute fracture or subluxation.   Multilevel degenerative changes.    *Updated report discussed with Dr. Drew 12:27 PM 10/15/2022.    --- End of Report ---    TERRI BAIG DO; Resident Radiologist  This document has been electronically signed.  ALEKSEY COLEMAN MD; Attending Radiologist  This document has been electronically signed. Oct 15 2022 12:34PM    < end of copied text >    Assessment / Plan: 87y F on coumadin BIBEMS from NH s/p fall ?HT ?LOC CTH shows small acute SAH.     - CTA Head and Neck (use as stability scan as well)  - Keppra / antiseizure ppx  - Hold coumadin INR 2.14   - Replete sodium   - Admitted to trauma going to SICU

## 2022-10-15 NOTE — ED PROVIDER NOTE - CARE PLAN
Principal Discharge DX:	Fall  Secondary Diagnosis:	Subarachnoid hemorrhage  Secondary Diagnosis:	Rib fractures   1

## 2022-10-15 NOTE — H&P ADULT - NSHPREVIEWOFSYSTEMS_GEN_ALL_CORE
Review of Systems  General:  Denies Fatigue, Denies Fever, Denies Weakness ,Denies Weight Loss   HEENT: See HPI  Cardio: Denies  Chest Pain , Palpitations    Respiratory: Denies worsening of SOB, Denies Cough  Abdomen: See detailed HPI  Neuro: See HPI  MSK: See HPI  Psych: Patient denies depression, denies suicidal or homicidal ideations  Integ: Patient Denies rash, or new skin lesions

## 2022-10-15 NOTE — ED PROVIDER NOTE - PHYSICAL EXAMINATION
CONSTITUTIONAL: NAD  SKIN: Warm dry, +1cm circular area of ecchymosis to LT lower back  No c/t/l midline ttp   HEAD: NCAT  EYES: NL inspection  ENT: MMM  NECK: Supple; non tender.  CARD: RRR  RESP: CTAB  ABD: S/NT no R/G  EXT: no pedal edema  MSK: +RT lateral chest ttp, FROM LE/UE  NEURO: Grossly unremarkable  PSYCH: Cooperative, appropriate.

## 2022-10-16 LAB
ANION GAP SERPL CALC-SCNC: 11 MMOL/L — SIGNIFICANT CHANGE UP (ref 7–14)
ANION GAP SERPL CALC-SCNC: 11 MMOL/L — SIGNIFICANT CHANGE UP (ref 7–14)
ANION GAP SERPL CALC-SCNC: 13 MMOL/L — SIGNIFICANT CHANGE UP (ref 7–14)
ANION GAP SERPL CALC-SCNC: 14 MMOL/L — SIGNIFICANT CHANGE UP (ref 7–14)
ANION GAP SERPL CALC-SCNC: 18 MMOL/L — HIGH (ref 7–14)
ANION GAP SERPL CALC-SCNC: 8 MMOL/L — SIGNIFICANT CHANGE UP (ref 7–14)
APPEARANCE UR: ABNORMAL
APTT BLD: 36.9 SEC — SIGNIFICANT CHANGE UP (ref 27–39.2)
APTT BLD: 38.1 SEC — SIGNIFICANT CHANGE UP (ref 27–39.2)
BACTERIA # UR AUTO: NEGATIVE — SIGNIFICANT CHANGE UP
BILIRUB UR-MCNC: NEGATIVE — SIGNIFICANT CHANGE UP
BUN SERPL-MCNC: 11 MG/DL — SIGNIFICANT CHANGE UP (ref 10–20)
BUN SERPL-MCNC: 6 MG/DL — LOW (ref 10–20)
BUN SERPL-MCNC: 7 MG/DL — LOW (ref 10–20)
BUN SERPL-MCNC: 9 MG/DL — LOW (ref 10–20)
CALCIUM SERPL-MCNC: 7.9 MG/DL — LOW (ref 8.4–10.5)
CALCIUM SERPL-MCNC: 8.2 MG/DL — LOW (ref 8.4–10.5)
CALCIUM SERPL-MCNC: 8.2 MG/DL — LOW (ref 8.4–10.5)
CALCIUM SERPL-MCNC: 8.3 MG/DL — LOW (ref 8.4–10.5)
CALCIUM SERPL-MCNC: 8.4 MG/DL — SIGNIFICANT CHANGE UP (ref 8.4–10.5)
CALCIUM SERPL-MCNC: 8.4 MG/DL — SIGNIFICANT CHANGE UP (ref 8.4–10.5)
CHLORIDE SERPL-SCNC: 79 MMOL/L — LOW (ref 98–110)
CHLORIDE SERPL-SCNC: 79 MMOL/L — LOW (ref 98–110)
CHLORIDE SERPL-SCNC: 82 MMOL/L — LOW (ref 98–110)
CHLORIDE SERPL-SCNC: 86 MMOL/L — LOW (ref 98–110)
CHLORIDE SERPL-SCNC: 90 MMOL/L — LOW (ref 98–110)
CHLORIDE SERPL-SCNC: 92 MMOL/L — LOW (ref 98–110)
CK SERPL-CCNC: 55 U/L — SIGNIFICANT CHANGE UP (ref 0–225)
CK SERPL-CCNC: 65 U/L — SIGNIFICANT CHANGE UP (ref 0–225)
CO2 SERPL-SCNC: 20 MMOL/L — SIGNIFICANT CHANGE UP (ref 17–32)
CO2 SERPL-SCNC: 23 MMOL/L — SIGNIFICANT CHANGE UP (ref 17–32)
CO2 SERPL-SCNC: 25 MMOL/L — SIGNIFICANT CHANGE UP (ref 17–32)
CO2 SERPL-SCNC: 25 MMOL/L — SIGNIFICANT CHANGE UP (ref 17–32)
CO2 SERPL-SCNC: 26 MMOL/L — SIGNIFICANT CHANGE UP (ref 17–32)
CO2 SERPL-SCNC: 26 MMOL/L — SIGNIFICANT CHANGE UP (ref 17–32)
COLOR SPEC: YELLOW — SIGNIFICANT CHANGE UP
CREAT SERPL-MCNC: 0.5 MG/DL — LOW (ref 0.7–1.5)
CREAT SERPL-MCNC: 0.5 MG/DL — LOW (ref 0.7–1.5)
CREAT SERPL-MCNC: 0.6 MG/DL — LOW (ref 0.7–1.5)
CREAT SERPL-MCNC: <0.5 MG/DL — LOW (ref 0.7–1.5)
DIFF PNL FLD: ABNORMAL
EGFR: 88 ML/MIN/1.73M2 — SIGNIFICANT CHANGE UP
EGFR: 92 ML/MIN/1.73M2 — SIGNIFICANT CHANGE UP
EGFR: 92 ML/MIN/1.73M2 — SIGNIFICANT CHANGE UP
EGFR: 97 ML/MIN/1.73M2 — SIGNIFICANT CHANGE UP
EPI CELLS # UR: 0 /HPF — SIGNIFICANT CHANGE UP (ref 0–5)
GLUCOSE BLDC GLUCOMTR-MCNC: 94 MG/DL — SIGNIFICANT CHANGE UP (ref 70–99)
GLUCOSE SERPL-MCNC: 104 MG/DL — HIGH (ref 70–99)
GLUCOSE SERPL-MCNC: 162 MG/DL — HIGH (ref 70–99)
GLUCOSE SERPL-MCNC: 91 MG/DL — SIGNIFICANT CHANGE UP (ref 70–99)
GLUCOSE SERPL-MCNC: 94 MG/DL — SIGNIFICANT CHANGE UP (ref 70–99)
GLUCOSE UR QL: NEGATIVE — SIGNIFICANT CHANGE UP
HCT VFR BLD CALC: 28.9 % — LOW (ref 37–47)
HGB BLD-MCNC: 10.2 G/DL — LOW (ref 12–16)
HYALINE CASTS # UR AUTO: 0 /LPF — SIGNIFICANT CHANGE UP (ref 0–7)
INR BLD: 1.48 RATIO — HIGH (ref 0.65–1.3)
INR BLD: 1.73 RATIO — HIGH (ref 0.65–1.3)
KETONES UR-MCNC: ABNORMAL
LEUKOCYTE ESTERASE UR-ACNC: NEGATIVE — SIGNIFICANT CHANGE UP
MAGNESIUM SERPL-MCNC: 1.4 MG/DL — LOW (ref 1.8–2.4)
MAGNESIUM SERPL-MCNC: 1.5 MG/DL — LOW (ref 1.8–2.4)
MAGNESIUM SERPL-MCNC: 2 MG/DL — SIGNIFICANT CHANGE UP (ref 1.8–2.4)
MAGNESIUM SERPL-MCNC: 2.2 MG/DL — SIGNIFICANT CHANGE UP (ref 1.8–2.4)
MCHC RBC-ENTMCNC: 33.6 PG — HIGH (ref 27–31)
MCHC RBC-ENTMCNC: 35.3 G/DL — SIGNIFICANT CHANGE UP (ref 32–37)
MCV RBC AUTO: 95.1 FL — SIGNIFICANT CHANGE UP (ref 81–99)
NITRITE UR-MCNC: NEGATIVE — SIGNIFICANT CHANGE UP
NRBC # BLD: 0 /100 WBCS — SIGNIFICANT CHANGE UP (ref 0–0)
OSMOLALITY SERPL: 253 MOS/KG — LOW (ref 280–301)
OSMOLALITY SERPL: 260 MOS/KG — LOW (ref 280–301)
OSMOLALITY SERPL: 278 MOS/KG — LOW (ref 280–301)
OSMOLALITY UR: 384 MOS/KG — SIGNIFICANT CHANGE UP (ref 50–1200)
PH UR: 8 — SIGNIFICANT CHANGE UP (ref 5–8)
PHOSPHATE SERPL-MCNC: 3.1 MG/DL — SIGNIFICANT CHANGE UP (ref 2.1–4.9)
PHOSPHATE SERPL-MCNC: 3.5 MG/DL — SIGNIFICANT CHANGE UP (ref 2.1–4.9)
PLATELET # BLD AUTO: 86 K/UL — LOW (ref 130–400)
POTASSIUM SERPL-MCNC: 3.1 MMOL/L — LOW (ref 3.5–5)
POTASSIUM SERPL-MCNC: 3.2 MMOL/L — LOW (ref 3.5–5)
POTASSIUM SERPL-MCNC: 3.7 MMOL/L — SIGNIFICANT CHANGE UP (ref 3.5–5)
POTASSIUM SERPL-MCNC: 3.7 MMOL/L — SIGNIFICANT CHANGE UP (ref 3.5–5)
POTASSIUM SERPL-MCNC: 3.9 MMOL/L — SIGNIFICANT CHANGE UP (ref 3.5–5)
POTASSIUM SERPL-MCNC: 4.4 MMOL/L — SIGNIFICANT CHANGE UP (ref 3.5–5)
POTASSIUM SERPL-SCNC: 3.1 MMOL/L — LOW (ref 3.5–5)
POTASSIUM SERPL-SCNC: 3.2 MMOL/L — LOW (ref 3.5–5)
POTASSIUM SERPL-SCNC: 3.7 MMOL/L — SIGNIFICANT CHANGE UP (ref 3.5–5)
POTASSIUM SERPL-SCNC: 3.7 MMOL/L — SIGNIFICANT CHANGE UP (ref 3.5–5)
POTASSIUM SERPL-SCNC: 3.9 MMOL/L — SIGNIFICANT CHANGE UP (ref 3.5–5)
POTASSIUM SERPL-SCNC: 4.4 MMOL/L — SIGNIFICANT CHANGE UP (ref 3.5–5)
PROT UR-MCNC: ABNORMAL
PROTHROM AB SERPL-ACNC: 17.1 SEC — HIGH (ref 9.95–12.87)
PROTHROM AB SERPL-ACNC: 20 SEC — HIGH (ref 9.95–12.87)
RBC # BLD: 3.04 M/UL — LOW (ref 4.2–5.4)
RBC # FLD: 13.7 % — SIGNIFICANT CHANGE UP (ref 11.5–14.5)
RBC CASTS # UR COMP ASSIST: 55 /HPF — HIGH (ref 0–4)
SODIUM SERPL-SCNC: 116 MMOL/L — CRITICAL LOW (ref 135–146)
SODIUM SERPL-SCNC: 116 MMOL/L — CRITICAL LOW (ref 135–146)
SODIUM SERPL-SCNC: 123 MMOL/L — LOW (ref 135–146)
SODIUM SERPL-SCNC: 123 MMOL/L — LOW (ref 135–146)
SODIUM SERPL-SCNC: 125 MMOL/L — LOW (ref 135–146)
SODIUM SERPL-SCNC: 125 MMOL/L — LOW (ref 135–146)
SODIUM UR-SCNC: 100 MMOL/L — SIGNIFICANT CHANGE UP
SP GR SPEC: 1.03 — SIGNIFICANT CHANGE UP (ref 1.01–1.03)
TROPONIN T SERPL-MCNC: <0.01 NG/ML — SIGNIFICANT CHANGE UP
UROBILINOGEN FLD QL: ABNORMAL
WBC # BLD: 1.86 K/UL — LOW (ref 4.8–10.8)
WBC # FLD AUTO: 1.86 K/UL — LOW (ref 4.8–10.8)
WBC UR QL: 6 /HPF — HIGH (ref 0–5)

## 2022-10-16 PROCEDURE — 71045 X-RAY EXAM CHEST 1 VIEW: CPT | Mod: 26

## 2022-10-16 PROCEDURE — 99291 CRITICAL CARE FIRST HOUR: CPT | Mod: 24,25

## 2022-10-16 PROCEDURE — 99233 SBSQ HOSP IP/OBS HIGH 50: CPT

## 2022-10-16 RX ORDER — SODIUM CHLORIDE 5 G/100ML
500 INJECTION, SOLUTION INTRAVENOUS
Refills: 0 | Status: DISCONTINUED | OUTPATIENT
Start: 2022-10-16 | End: 2022-10-17

## 2022-10-16 RX ORDER — POTASSIUM CHLORIDE 20 MEQ
20 PACKET (EA) ORAL ONCE
Refills: 0 | Status: COMPLETED | OUTPATIENT
Start: 2022-10-16 | End: 2022-10-16

## 2022-10-16 RX ORDER — POTASSIUM CHLORIDE 20 MEQ
10 PACKET (EA) ORAL ONCE
Refills: 0 | Status: DISCONTINUED | OUTPATIENT
Start: 2022-10-16 | End: 2022-10-16

## 2022-10-16 RX ORDER — POTASSIUM CHLORIDE 20 MEQ
40 PACKET (EA) ORAL ONCE
Refills: 0 | Status: COMPLETED | OUTPATIENT
Start: 2022-10-16 | End: 2022-10-16

## 2022-10-16 RX ORDER — SODIUM CHLORIDE 5 G/100ML
500 INJECTION, SOLUTION INTRAVENOUS
Refills: 0 | Status: DISCONTINUED | OUTPATIENT
Start: 2022-10-16 | End: 2022-10-16

## 2022-10-16 RX ORDER — HEPARIN SODIUM 5000 [USP'U]/ML
5000 INJECTION INTRAVENOUS; SUBCUTANEOUS EVERY 8 HOURS
Refills: 0 | Status: DISCONTINUED | OUTPATIENT
Start: 2022-10-16 | End: 2022-10-20

## 2022-10-16 RX ORDER — MAGNESIUM SULFATE 500 MG/ML
1 VIAL (ML) INJECTION ONCE
Refills: 0 | Status: COMPLETED | OUTPATIENT
Start: 2022-10-16 | End: 2022-10-16

## 2022-10-16 RX ORDER — LIDOCAINE 4 G/100G
1 CREAM TOPICAL EVERY 24 HOURS
Refills: 0 | Status: DISCONTINUED | OUTPATIENT
Start: 2022-10-16 | End: 2022-10-20

## 2022-10-16 RX ORDER — LABETALOL HCL 100 MG
10 TABLET ORAL ONCE
Refills: 0 | Status: COMPLETED | OUTPATIENT
Start: 2022-10-16 | End: 2022-10-16

## 2022-10-16 RX ORDER — SODIUM CHLORIDE 9 MG/ML
1 INJECTION INTRAMUSCULAR; INTRAVENOUS; SUBCUTANEOUS EVERY 8 HOURS
Refills: 0 | Status: DISCONTINUED | OUTPATIENT
Start: 2022-10-16 | End: 2022-10-18

## 2022-10-16 RX ORDER — POTASSIUM CHLORIDE 20 MEQ
20 PACKET (EA) ORAL
Refills: 0 | Status: DISCONTINUED | OUTPATIENT
Start: 2022-10-16 | End: 2022-10-16

## 2022-10-16 RX ORDER — MAGNESIUM SULFATE 500 MG/ML
2 VIAL (ML) INJECTION ONCE
Refills: 0 | Status: COMPLETED | OUTPATIENT
Start: 2022-10-16 | End: 2022-10-16

## 2022-10-16 RX ADMIN — GABAPENTIN 200 MILLIGRAM(S): 400 CAPSULE ORAL at 05:17

## 2022-10-16 RX ADMIN — OXCARBAZEPINE 150 MILLIGRAM(S): 300 TABLET, FILM COATED ORAL at 05:16

## 2022-10-16 RX ADMIN — LIDOCAINE 1 PATCH: 4 CREAM TOPICAL at 08:29

## 2022-10-16 RX ADMIN — GABAPENTIN 200 MILLIGRAM(S): 400 CAPSULE ORAL at 21:08

## 2022-10-16 RX ADMIN — DULOXETINE HYDROCHLORIDE 30 MILLIGRAM(S): 30 CAPSULE, DELAYED RELEASE ORAL at 12:38

## 2022-10-16 RX ADMIN — Medication 650 MILLIGRAM(S): at 18:26

## 2022-10-16 RX ADMIN — Medication 650 MILLIGRAM(S): at 23:27

## 2022-10-16 RX ADMIN — LOSARTAN POTASSIUM 100 MILLIGRAM(S): 100 TABLET, FILM COATED ORAL at 05:17

## 2022-10-16 RX ADMIN — SODIUM CHLORIDE 1 GRAM(S): 9 INJECTION INTRAMUSCULAR; INTRAVENOUS; SUBCUTANEOUS at 04:17

## 2022-10-16 RX ADMIN — Medication 25 MILLIGRAM(S): at 05:18

## 2022-10-16 RX ADMIN — ATORVASTATIN CALCIUM 10 MILLIGRAM(S): 80 TABLET, FILM COATED ORAL at 21:08

## 2022-10-16 RX ADMIN — Medication 650 MILLIGRAM(S): at 13:20

## 2022-10-16 RX ADMIN — HEPARIN SODIUM 5000 UNIT(S): 5000 INJECTION INTRAVENOUS; SUBCUTANEOUS at 21:09

## 2022-10-16 RX ADMIN — Medication 650 MILLIGRAM(S): at 19:00

## 2022-10-16 RX ADMIN — Medication 20 MILLIEQUIVALENT(S): at 20:10

## 2022-10-16 RX ADMIN — PANTOPRAZOLE SODIUM 40 MILLIGRAM(S): 20 TABLET, DELAYED RELEASE ORAL at 07:28

## 2022-10-16 RX ADMIN — SODIUM CHLORIDE 40 MILLILITER(S): 5 INJECTION, SOLUTION INTRAVENOUS at 02:13

## 2022-10-16 RX ADMIN — SODIUM CHLORIDE 30 MILLILITER(S): 5 INJECTION, SOLUTION INTRAVENOUS at 12:38

## 2022-10-16 RX ADMIN — Medication 650 MILLIGRAM(S): at 05:15

## 2022-10-16 RX ADMIN — Medication 25 MILLIGRAM(S): at 18:26

## 2022-10-16 RX ADMIN — Medication 650 MILLIGRAM(S): at 07:09

## 2022-10-16 RX ADMIN — Medication 10 MILLIGRAM(S): at 13:27

## 2022-10-16 RX ADMIN — LIDOCAINE 1 PATCH: 4 CREAM TOPICAL at 20:00

## 2022-10-16 RX ADMIN — Medication 50 GRAM(S): at 04:15

## 2022-10-16 RX ADMIN — Medication 650 MILLIGRAM(S): at 12:38

## 2022-10-16 RX ADMIN — Medication 25 GRAM(S): at 03:04

## 2022-10-16 RX ADMIN — Medication 50 MICROGRAM(S): at 05:16

## 2022-10-16 RX ADMIN — HEPARIN SODIUM 5000 UNIT(S): 5000 INJECTION INTRAVENOUS; SUBCUTANEOUS at 13:40

## 2022-10-16 RX ADMIN — LIDOCAINE 1 PATCH: 4 CREAM TOPICAL at 20:52

## 2022-10-16 RX ADMIN — OXCARBAZEPINE 150 MILLIGRAM(S): 300 TABLET, FILM COATED ORAL at 18:26

## 2022-10-16 RX ADMIN — SODIUM CHLORIDE 1 GRAM(S): 9 INJECTION INTRAMUSCULAR; INTRAVENOUS; SUBCUTANEOUS at 21:08

## 2022-10-16 RX ADMIN — Medication 50 MILLIEQUIVALENT(S): at 05:15

## 2022-10-16 RX ADMIN — Medication 40 MILLIEQUIVALENT(S): at 13:41

## 2022-10-16 RX ADMIN — SODIUM CHLORIDE 1 GRAM(S): 9 INJECTION INTRAMUSCULAR; INTRAVENOUS; SUBCUTANEOUS at 13:41

## 2022-10-16 RX ADMIN — Medication 40 MILLIEQUIVALENT(S): at 18:26

## 2022-10-16 NOTE — CONSULT NOTE ADULT - SUBJECTIVE AND OBJECTIVE BOX
Patient is a 87y old  Female who presents with a chief complaint of S/P Fall, subarachnoid hemorrhage, multiple rib fracture (16 Oct 2022 02:05)      HPI:  Patient is a 86 y/o female with PMHx of MDS ( on Vidaza- gets every 28 days- just finished course- due in around 3 weeks) HTN, HLD, A-Fib ( On Coumadin 5mg daily), hypothyroid, neuropathy of B/L LE, Trigeminal Neuralgia ( Prior Gamma knife procedure for pain), B/L OA of the knees, prior CCY who presented to [facility] s/p fall at home. Patient was ambulating at home with the walker when she fell onto the right side. She doesn't have the best recollection of the event but denies LOC. She has a home health aide and her grandson was also home at the time. She was able to stand and walk but presented as notes ongoing dizziness and pain over her right chest. Pain over the right side is dull, worse with motion, but not sharp. She notes dizziness notable after fall but not prior. With the dizziness she gets occasional nausea. Prior to fall she denies any change in medications, new medications, recent illness, nor any other features out different from her baseline .  (15 Oct 2022 14:26)       ROS:  Negative except for:    PAST MEDICAL & SURGICAL HISTORY:  Atrial fibrillation      Hypertension      Hypothyroid      Anxiety      H/O colonoscopy  10 yrs ago      History of cholecystectomy          SOCIAL HISTORY:    FAMILY HISTORY:      MEDICATIONS  (STANDING):  acetaminophen     Tablet .. 650 milliGRAM(s) Oral every 6 hours  atorvastatin 10 milliGRAM(s) Oral at bedtime  DULoxetine 30 milliGRAM(s) Oral daily  gabapentin 200 milliGRAM(s) Oral every 8 hours  levothyroxine 50 MICROGram(s) Oral daily  losartan 100 milliGRAM(s) Oral daily  metoprolol tartrate 25 milliGRAM(s) Oral every 12 hours  OXcarbazepine 150 milliGRAM(s) Oral two times a day  pantoprazole    Tablet 40 milliGRAM(s) Oral before breakfast  sodium chloride 1 Gram(s) Oral every 8 hours  sodium chloride 3%. 500 milliLiter(s) (40 mL/Hr) IV Continuous <Continuous>    MEDICATIONS  (PRN):    Height (cm): 160 (10-15-22 @ 10:12)  Allergies    Tegretol (Hives; Rash)    Intolerances        Vital Signs Last 24 Hrs  T(C): 35.8 (16 Oct 2022 07:27), Max: 36.2 (15 Oct 2022 16:30)  T(F): 96.5 (16 Oct 2022 07:27), Max: 97.2 (15 Oct 2022 16:30)  HR: 65 (16 Oct 2022 06:00) (65 - 85)  BP: 112/56 (16 Oct 2022 06:00) (112/56 - 184/89)  BP(mean): 79 (16 Oct 2022 06:00) (79 - 128)  RR: 14 (16 Oct 2022 03:00) (14 - 20)  SpO2: 99% (16 Oct 2022 06:00) (97% - 100%)    Parameters below as of 15 Oct 2022 20:00  Patient On (Oxygen Delivery Method): room air        PHYSICAL EXAM  General: adult in NAD  HEENT: clear oropharynx, anicteric sclera, pink conjunctiva  Neck: supple  CV: normal S1/S2 with no murmur rubs or gallops  Lungs: positive air movement b/l ant lungs,clear to auscultation, no wheezes, no rales  Abdomen: soft non-tender non-distended, no hepatosplenomegaly  Ext: no clubbing cyanosis or edema  Skin: no rashes and no petechiae  Neuro: alert and oriented X 4, no focal deficits      LABS:                          10.8   2.02  )-----------( 87       ( 15 Oct 2022 22:45 )             29.6         Mean Cell Volume : 92.2 fL  Mean Cell Hemoglobin : 33.6 pg  Mean Cell Hemoglobin Concentration : 36.5 g/dL  Auto Neutrophil # : 1.03 K/uL  Auto Lymphocyte # : 0.77 K/uL  Auto Monocyte # : 0.12 K/uL  Auto Eosinophil # : 0.07 K/uL  Auto Basophil # : 0.02 K/uL  Auto Neutrophil % : 51.0 %  Auto Lymphocyte % : 38.1 %  Auto Monocyte % : 5.9 %  Auto Eosinophil % : 3.5 %  Auto Basophil % : 1.0 %      Serial CBC's  10-15 @ 22:45  Hct-29.6 / Hgb-10.8 / Plat-87 / RBC-3.21 / WBC-2.02  Serial CBC's  10-15 @ 10:46  Hct-31.2 / Hgb-11.3 / Plat-115 / RBC-3.36 / WBC-1.83      10-16    116<LL>  |  79<L>  |  7<L>  ----------------------------<  91  3.7   |  26  |  <0.5<L>    Ca    8.4      16 Oct 2022 00:57  Phos  3.5     10-16  Mg     1.5     10-16    TPro  6.4  /  Alb  3.8  /  TBili  0.8  /  DBili  x   /  AST  43<H>  /  ALT  17  /  AlkPhos  123<H>  10-15      PT/INR - ( 15 Oct 2022 19:00 )   PT: 26.20 sec;   INR: 2.24 ratio         PTT - ( 15 Oct 2022 10:46 )  PTT:29.6 sec                BLOOD SMEAR INTERPRETATION:       RADIOLOGY & ADDITIONAL STUDIES:    < from: CT Angio Neck w/ IV Cont (10.15.22 @ 16:39) >  IMPRESSION:  1.  Previously observed subarachnoid hemorrhage in the right   quadrigeminal plate is likely reflects venous plexus. Confirmation with   MRI can be obtained as indicated.  2.  Mild atherosclerotic stenosis in bilateral cervical ICAs (up to 30%)   and bilateral carotid siphons.  3.  Mild atherosclerotic stenosis in bilateral V4 vertebral arteries.  < end of copied text >      < from: CT Angio Head w/ IV Cont (10.15.22 @ 16:39) >    IMPRESSION:  1.  Previously observed subarachnoid hemorrhage in the right   quadrigeminal plate is likely reflects venous plexus. Confirmation with   MRI can be obtained as indicated.  2.  Mild atherosclerotic stenosis in bilateral cervical ICAs (up to 30%)   and bilateral carotid siphons.  3.  Mild atherosclerotic stenosis in bilateral V4 vertebral arteries.  < end of copied text >    < from: Xray Pelvis AP only (10.15.22 @ 11:56) >  Impression:  No evidence of acute fracture.  --- End of Report ---  < end of copied text >      < from: CT Abdomen and Pelvis w/ IV Cont (10.15.22 @ 11:39) >  IMPRESSION:  1. Acute, minimally displaced right lateral 7-10th rib fractures; no   pneumothorax. Evaluation limited by motion.  2. No evidence of solid abdominal organ injury.  --- End of Report ---  < end of copied text >    < from: CT Cervical Spine No Cont (10.15.22 @ 11:38) >  IMPRESSION:  1.  CT head: *Small acute subarachnoid hemorrhage within the right   quadrigeminal plate cistern.  2.  CT cervical spine: No evidence of acute fracture or subluxation.   Multilevel degenerative changes.  *Updated report discussed with Dr. Drew 12:27 PM 10/15/2022.  --- End of Report ---  < end of copied text >

## 2022-10-16 NOTE — PROGRESS NOTE ADULT - SUBJECTIVE AND OBJECTIVE BOX
HPI: Patient is a 86 y/o female with PMHx of MDS ( on Vidaza- gets every 28 days- just finished course- due in around 3 weeks) HTN, HLD, A-Fib ( On Coumadin 5mg daily), hypothyroid, neuropathy of B/L LE, Trigeminal Neuralgia ( Prior Gamma knife procedure for pain), B/L OA of the knees, prior CCY who presented to [facility] s/p fall at home. Patient was ambulating at home with the walker when she fell onto the right side. She doesn't have the best recollection of the event but denies LOC. She has a home health aide and her grandson was also home at the time. She was able to stand and walk but presented as notes ongoing dizziness and pain over her right chest. Pain over the right side is dull, worse with motion, but not sharp. She notes dizziness notable after fall but not prior. With the dizziness she gets occasional nausea. Prior to fall she denies any change in medications, new medications, recent illness, nor any other features out different from her baseline .  (15 Oct 2022 14:26)      PAST MEDICAL & SURGICAL HISTORY:  Atrial fibrillation      Hypertension      Hypothyroid      Anxiety      H/O colonoscopy  10 yrs ago      History of cholecystectomy      HEALTH ISSUES - PROBLEM Dx:      FAMILY HISTORY:      Allergies    Tegretol (Hives; Rash)    Intolerances    REVIEW OF SYSTEMS : As listed in HPI  Head and Neck:   Cardio :   Pum :  GI:  Neuro:     MEDICATIONS  (STANDING):  acetaminophen     Tablet .. 650 milliGRAM(s) Oral every 6 hours  atorvastatin 10 milliGRAM(s) Oral at bedtime  DULoxetine 30 milliGRAM(s) Oral daily  gabapentin 200 milliGRAM(s) Oral every 8 hours  heparin   Injectable 5000 Unit(s) SubCutaneous every 8 hours  levothyroxine 50 MICROGram(s) Oral daily  lidocaine   4% Patch 1 Patch Transdermal every 24 hours  losartan 100 milliGRAM(s) Oral daily  metoprolol tartrate 25 milliGRAM(s) Oral every 12 hours  OXcarbazepine 150 milliGRAM(s) Oral two times a day  pantoprazole    Tablet 40 milliGRAM(s) Oral before breakfast  potassium chloride  20 mEq/100 mL IVPB 20 milliEquivalent(s) IV Intermittent every 2 hours  sodium chloride 1 Gram(s) Oral every 8 hours  sodium chloride 3%. 500 milliLiter(s) (30 mL/Hr) IV Continuous <Continuous>    MEDICATIONS  (PRN):    Anticoagulation:  heparin   Injectable 5000 Unit(s) SubCutaneous every 8 hours    Vital Signs Last 24 Hrs  T(C): 36.1 (16 Oct 2022 08:00), Max: 36.2 (15 Oct 2022 16:30)  T(F): 97 (16 Oct 2022 08:00), Max: 97.2 (15 Oct 2022 16:30)  HR: 78 (16 Oct 2022 09:00) (65 - 85)  BP: 149/76 (16 Oct 2022 09:00) (112/56 - 184/89)  BP(mean): 104 (16 Oct 2022 09:00) (79 - 128)  RR: 19 (16 Oct 2022 09:00) (14 - 20)  SpO2: 97% (16 Oct 2022 09:00) (97% - 100%)    Parameters below as of 16 Oct 2022 09:00  Patient On (Oxygen Delivery Method): room air    Physical Exam :  General :   A&O x 3   Tongue midline  Facial features symmetric, No droop  Speech clear and appropriate, no slur   Pt speaking in full sentences   Follows all commands   Occular :   PERRLA, EOMI   Motor :   MAEx4   No spinal point tenderness   Sensory :  Intact bilaterally     Pronator Drift : none     LABS:                        10.8   2.02  )-----------( 87       ( 15 Oct 2022 22:45 )             29.6     10-16    125<L>  |  82<L>  |  6<L>  ----------------------------<  91  3.2<L>   |  25  |  <0.5<L>    Ca    8.2<L>      16 Oct 2022 06:03  Phos  3.5     10-16  Mg     1.5     10-16    TPro  6.4  /  Alb  3.8  /  TBili  0.8  /  DBili  x   /  AST  43<H>  /  ALT  17  /  AlkPhos  123<H>  10-15    PT/INR - ( 16 Oct 2022 06:03 )   PT: 17.10 sec;   INR: 1.48 ratio       PTT - ( 16 Oct 2022 06:03 )  PTT:38.1 sec    RADIOLOGY & ADDITIONAL STUDIES:  < from: CT Angio Neck w/ IV Cont (10.15.22 @ 16:39) >  RIGHT ANTERIOR CIRCULATION:  Common carotid and external carotid arteries are patent. Mild calcific   atherosclerosis contributing to mild stenosis of the proximal cervical   ICA (30%). There is long segmental beading of the cervical ICA without   flow-limiting stenosis.  Extensive calcific atherosclerosis contributing to mild stenosis of the   supraclinoid ICA. Inferiorly projecting infundibulum measuring 1-2 mm   (601-129).    Mild stenoses in the opercular M3 segment, for instance series 4 image   213. The remaining MCA branches are patent.    There is mild irregular stenosis in the right A1 anterior cerebral artery   (TRISTON). The remaining anterior cerebral artery is patent.    LEFT ANTERIOR CIRCULATION:  There are scattered calcific plaques throughout the left CCA without   flow-limiting stenosis. There is mild calcific plaque in the origin of   the ECA resulting in mild stenosis. Mild calcific atherosclerosis   contributing to mild stenosis of the proximal cervical ICA (20%). Mild   beaded appearance of the cervical ICA.Extensive calcific atherosclerosis   in the carotid siphon contributing to mild stenosis in the cavernous and   supraclinoid ICA.    The MCA and RTISTON are patent.    POSTERIOR CIRCULATION:  Mild calcific atherosclerosis in bilateral V4 segments contributing to   mild stenosis bilaterally. Basilar artery is patent.  Bilateral posterior cerebral arteries are patent.    Pain dural venous sinuses. No vascular occlusion or high-grade stenosis.    OTHERS:  Questionable cluster of tiny vessels within the region of the right   quadrigeminal plate versus small subarachnoid hemorrhage as demonstrated   on concurrent CT head.    Severe degenerative changes of the spine.    Please see concurrent CT chest or intrathoracic examination.      IMPRESSION:    1.  Previously observed subarachnoid hemorrhage in the right   quadrigeminal plate is likely reflects venous plexus. Confirmation with   MRI can be obtained as indicated.    2.  Mild atherosclerotic stenosis in bilateral cervical ICAs (up to 30%)   and bilateral carotid siphons.    3.  Mild atherosclerotic stenosis in bilateral V4 vertebral arteries.      --- End of Report ---    ***Please see the addendum at the top of this report. It may contain   additional important information or changes.****      TERRI BAIG DO; Resident Radiologist  This document has been electronically signed.  TROY RASHID MD; Attending Radiologist  This document has been electronically signed. Oct 15 2022 11:41PM  Addend:TROY RASHID MD; Attending Radiologist  This addendum was electronically signed on: Oct 15 2022 11:59PM.    < end of copied text >    Assessment / Plan: 87y F on Coumadin s/p fall CTH shows small questionable SAH vs venous plexus to quadreminal cistern. CTA showing mild- moderate stenosis & plaque. Exam intact without neurodeficits.   - No acute neurosurgical intervention indicated at this time   - [facility] Neuroendovascular Cx / review of CTA  - Ok for ppx SQH  - Ok for routine neuro checks from a neurosurgical standpoint     HPI: Patient is a 86 y/o female with PMHx of MDS ( on Vidaza- gets every 28 days- just finished course- due in around 3 weeks) HTN, HLD, A-Fib ( On Coumadin 5mg daily), hypothyroid, neuropathy of B/L LE, Trigeminal Neuralgia ( Prior Gamma knife procedure for pain), B/L OA of the knees, prior CCY who presented to [facility] s/p fall at home. Patient was ambulating at home with the walker when she fell onto the right side. She doesn't have the best recollection of the event but denies LOC. She has a home health aide and her grandson was also home at the time. She was able to stand and walk but presented as notes ongoing dizziness and pain over her right chest. Pain over the right side is dull, worse with motion, but not sharp. She notes dizziness notable after fall but not prior. With the dizziness she gets occasional nausea. Prior to fall she denies any change in medications, new medications, recent illness, nor any other features out different from her baseline .  (15 Oct 2022 14:26)    PAST MEDICAL & SURGICAL HISTORY:  Atrial fibrillation    Hypertension    Hypothyroid    Anxiety      H/O colonoscopy  10 yrs ago      History of cholecystectomy      HEALTH ISSUES - PROBLEM Dx:      FAMILY HISTORY:      Allergies    Tegretol (Hives; Rash)    Intolerances    REVIEW OF SYSTEMS : As listed in HPI  Head and Neck:   Cardio :   Pum :  GI:  Neuro:     MEDICATIONS  (STANDING):  acetaminophen     Tablet .. 650 milliGRAM(s) Oral every 6 hours  atorvastatin 10 milliGRAM(s) Oral at bedtime  DULoxetine 30 milliGRAM(s) Oral daily  gabapentin 200 milliGRAM(s) Oral every 8 hours  heparin   Injectable 5000 Unit(s) SubCutaneous every 8 hours  levothyroxine 50 MICROGram(s) Oral daily  lidocaine   4% Patch 1 Patch Transdermal every 24 hours  losartan 100 milliGRAM(s) Oral daily  metoprolol tartrate 25 milliGRAM(s) Oral every 12 hours  OXcarbazepine 150 milliGRAM(s) Oral two times a day  pantoprazole    Tablet 40 milliGRAM(s) Oral before breakfast  potassium chloride  20 mEq/100 mL IVPB 20 milliEquivalent(s) IV Intermittent every 2 hours  sodium chloride 1 Gram(s) Oral every 8 hours  sodium chloride 3%. 500 milliLiter(s) (30 mL/Hr) IV Continuous <Continuous>    MEDICATIONS  (PRN):    Anticoagulation:  heparin   Injectable 5000 Unit(s) SubCutaneous every 8 hours    Vital Signs Last 24 Hrs  T(C): 36.1 (16 Oct 2022 08:00), Max: 36.2 (15 Oct 2022 16:30)  T(F): 97 (16 Oct 2022 08:00), Max: 97.2 (15 Oct 2022 16:30)  HR: 78 (16 Oct 2022 09:00) (65 - 85)  BP: 149/76 (16 Oct 2022 09:00) (112/56 - 184/89)  BP(mean): 104 (16 Oct 2022 09:00) (79 - 128)  RR: 19 (16 Oct 2022 09:00) (14 - 20)  SpO2: 97% (16 Oct 2022 09:00) (97% - 100%)    Parameters below as of 16 Oct 2022 09:00  Patient On (Oxygen Delivery Method): room air    Physical Exam :  General :   A&O x 3   Tongue midline  Facial features symmetric, No droop  Speech clear and appropriate, no slur   Pt speaking in full sentences   Follows all commands   Occular :   PERRLA, EOMI   Motor :   MAEx4   No spinal point tenderness   Sensory :  Intact bilaterally     Pronator Drift : none     LABS:                        10.8   2.02  )-----------( 87       ( 15 Oct 2022 22:45 )             29.6     10-16    125<L>  |  82<L>  |  6<L>  ----------------------------<  91  3.2<L>   |  25  |  <0.5<L>    Ca    8.2<L>      16 Oct 2022 06:03  Phos  3.5     10-16  Mg     1.5     10-16    TPro  6.4  /  Alb  3.8  /  TBili  0.8  /  DBili  x   /  AST  43<H>  /  ALT  17  /  AlkPhos  123<H>  10-15    PT/INR - ( 16 Oct 2022 06:03 )   PT: 17.10 sec;   INR: 1.48 ratio       PTT - ( 16 Oct 2022 06:03 )  PTT:38.1 sec    RADIOLOGY & ADDITIONAL STUDIES:  < from: CT Angio Neck w/ IV Cont (10.15.22 @ 16:39) >  RIGHT ANTERIOR CIRCULATION:  Common carotid and external carotid arteries are patent. Mild calcific   atherosclerosis contributing to mild stenosis of the proximal cervical   ICA (30%). There is long segmental beading of the cervical ICA without   flow-limiting stenosis.  Extensive calcific atherosclerosis contributing to mild stenosis of the   supraclinoid ICA. Inferiorly projecting infundibulum measuring 1-2 mm   (601-129).    Mild stenoses in the opercular M3 segment, for instance series 4 image   213. The remaining MCA branches are patent.    There is mild irregular stenosis in the right A1 anterior cerebral artery   (TRISTON). The remaining anterior cerebral artery is patent.    LEFT ANTERIOR CIRCULATION:  There are scattered calcific plaques throughout the left CCA without   flow-limiting stenosis. There is mild calcific plaque in the origin of   the ECA resulting in mild stenosis. Mild calcific atherosclerosis   contributing to mild stenosis of the proximal cervical ICA (20%). Mild   beaded appearance of the cervical ICA.Extensive calcific atherosclerosis   in the carotid siphon contributing to mild stenosis in the cavernous and   supraclinoid ICA.    The MCA and TRISTON are patent.    POSTERIOR CIRCULATION:  Mild calcific atherosclerosis in bilateral V4 segments contributing to   mild stenosis bilaterally. Basilar artery is patent.  Bilateral posterior cerebral arteries are patent.    Pain dural venous sinuses. No vascular occlusion or high-grade stenosis.    OTHERS:  Questionable cluster of tiny vessels within the region of the right   quadrigeminal plate versus small subarachnoid hemorrhage as demonstrated   on concurrent CT head.    Severe degenerative changes of the spine.    Please see concurrent CT chest or intrathoracic examination.      IMPRESSION:    1.  Previously observed subarachnoid hemorrhage in the right   quadrigeminal plate is likely reflects venous plexus. Confirmation with   MRI can be obtained as indicated.    2.  Mild atherosclerotic stenosis in bilateral cervical ICAs (up to 30%)   and bilateral carotid siphons.    3.  Mild atherosclerotic stenosis in bilateral V4 vertebral arteries.      --- End of Report ---    ***Please see the addendum at the top of this report. It may contain   additional important information or changes.****      TERRI BAIG DO; Resident Radiologist  This document has been electronically signed.  TROY RASHID MD; Attending Radiologist  This document has been electronically signed. Oct 15 2022 11:41PM  Addend:TROY RASHID MD; Attending Radiologist  This addendum was electronically signed on: Oct 15 2022 11:59PM.    < end of copied text >    Assessment / Plan: 87y F on Coumadin s/p fall CTH shows small questionable SAH vs venous plexus to quadreminal cistern. CTA showing mild- moderate stenosis & plaque. Exam intact without neurodeficits.   - No acute neurosurgical intervention indicated at this time   - [facility] Neuroendovascular Cx / review of CTA  - Ok for ppx SQH  - Continue holding Coumadin, may restart in 1 week (10.22.22)  - Ok for routine neuro checks from a neurosurgical standpoint, no need for further imaging unless change in mental status

## 2022-10-16 NOTE — CONSULT NOTE ADULT - ASSESSMENT
88 yo F with PMH of HTN. HLD, Hypothyroid disease, Afib (on warfarin) and MDS (on Vizada D1-7 every 28 days - last treatment 1 week ago), Trigeminal Neuralgia (on Trileptal, prior gamma knife procedure), B/L knee Osteoarthritis- walker device dependent was brought in by EMS after an unwitnessed fall at home. Imaging shows small acute SAH within the right quadrigeminal plate cistern and right 7-10 rib fxs. Admitted to SICU for close monitoring. Seen by neurosurgery and recommended to hold coumadin and start AED. Hematology consulted for her MDS.     # MDS on Vizada, stable  - inform primary hematologist of pt's admission  - ANC > 500  - last CBC done on 7/16/22 showing hgb of 9.5, WBC of 3.32 and platelets of 141   - CBC appears to be close to baseline   - f/u with primary hematologist as outpatient    Rest of management per primary team

## 2022-10-16 NOTE — PHYSICAL THERAPY INITIAL EVALUATION ADULT - PERTINENT HX OF CURRENT PROBLEM, REHAB EVAL
Patient is a 86 y/o female with PMHx of MDS ( on Vidaza- gets every 28 days- just finished course- due in around 3 weeks) HTN, HLD, A-Fib ( On Coumadin 5mg daily), hypothyroid, neuropathy of B/L LE, Trigeminal Neuralgia ( Prior Gamma knife procedure for pain), B/L OA of the knees, prior CCY who presented to [facility] s/p fall at home. Patient was ambulating at home with the walker when she fell onto the right side. Patient noted on workup to have right sided fracture of the 7-10th right ribs without displacement. On Incentive she can do around 500but without respiratory distress or hemodynamic instability. She was also noted on CT to have a small right sub arachnoid hemorrhage. Admitted to surgery and will have a SICU consult with possible SICU admission due to trauma burden.

## 2022-10-16 NOTE — PHYSICAL THERAPY INITIAL EVALUATION ADULT - GENERAL OBSERVATIONS, REHAB EVAL
Pt encountered in semi-falcon position in bed A & O x 4 in NAD, +tele/VSS, +IV, no c/o pain and agreeable with PT. Pt performed bed mobility with Min A, transfer mobility Min A and ambulated 40 ft Min A using RW with unsteady gait/balance secondary weakness. Pt left seated in chair in NAD, SALLY Garcia aware.

## 2022-10-16 NOTE — CONSULT NOTE ADULT - ASSESSMENT
IMPRESSION: Rehabilitation for Fall, SAH, right 7-10th rib fractures    PRECAUTIONS: [  ] Cardiac  [  ] Respiratory  [  ] Seizures [  ] Contact Precautions  [  ] Droplet Isolation  [  ] Other:      Weight Bearing Status:  WBAT    RECOMMENDATION:    Out of bed to chair     DVT/decubitus ulcer prophylaxis    REHABILITATION PLAN:     [  X ] Bedside PT 3-5 times a week   [  X ]   Bedside OT  2-3 times a week             [   ] No Rehab Therapy Indicated                   [   ]  Speech Therapy   Conditioning/ROM                                    ADL  Bed Mobility                                               Conditioning/ROM  Transfers                                                     Bed Mobility  Sitting /Standing Balance                         Transfers                                        Gait Training                                               Sitting/Standing Balance  Stair Training  [ X  ] Applicable                    Home Equipment Evaluation                                                                        Splinting  [   ] Only      GOALS:   ADL   [  X ]   Independent                    Transfers  [ X  ] Independent                          Ambulation  [ X  ] Independent     [  X  ] With device                            [   ]  CG                                                         [   ]  CG                                                                  [   ] CG                            [    ] Min A                                                   [   ] Min A                                                              [   ] Min  A          DISCHARGE PLAN:  [  X  ]  Good candidate for Intensive Rehabilitation/Hospital-based 4A [facility]                                             Will tolerate 3 hours of Intensive Rehab Daily                                       [    ]  Short Term Rehabilitation in Skilled Nursing Facility                                       [    ]  Home with Outpatient or  services                                         [    ]  Possible Candidate for Intensive Hospital-Based Rehabilitation      Thank you.

## 2022-10-16 NOTE — CONSULT NOTE ADULT - SUBJECTIVE AND OBJECTIVE BOX
HPI:  Patient is a 86 y/o right-handed female with PMHx of MDS ( on Vidaza- gets every 28 days- just finished course- due in around 3 weeks) HTN, HLD, A-Fib ( On Coumadin 5mg daily), hypothyroid, neuropathy of B/L LE, Trigeminal Neuralgia ( Prior Gamma knife procedure for pain), B/L OA of the knees, prior CCY who presented to [facility] s/p fall at home. Patient was ambulating at home with the walker when she fell onto the right side. She doesn't have the best recollection of the event but denies LOC. She has a home health aide and her grandson was also home at the time. She was able to stand and walk but presented as notes ongoing dizziness and pain over her right chest. Pain over the right side is dull, worse with motion, but not sharp. She notes dizziness notable after fall but not prior. With the dizziness she gets occasional nausea. Prior to fall she denies any change in medications, new medications, recent illness, nor any other features out different from her baseline .  (15 Oct 2022 14:26)      PAST MEDICAL & SURGICAL HISTORY:  Atrial fibrillation      Hypertension      Hypothyroid      Anxiety      H/O colonoscopy  10 yrs ago      History of cholecystectomy          HOSPITAL COURSE: Imaging studies obtained. Seen by Physical Therapy today.    TODAY'S SUBJECTIVE & REVIEW OF SYMPTOMS:    Constitutional WNL  Cardiac WNL   Respiratory WNL  GI WNL   WNL  Endocrine WNL  Skin WNL  Musculoskeletal WNL  Neuro WNL  Cognitive WNL  Psych WNL      MEDICATIONS  (STANDING):  acetaminophen     Tablet .. 650 milliGRAM(s) Oral every 6 hours  atorvastatin 10 milliGRAM(s) Oral at bedtime  DULoxetine 30 milliGRAM(s) Oral daily  gabapentin 200 milliGRAM(s) Oral every 8 hours  heparin   Injectable 5000 Unit(s) SubCutaneous every 8 hours  levothyroxine 50 MICROGram(s) Oral daily  lidocaine   4% Patch 1 Patch Transdermal every 24 hours  losartan 100 milliGRAM(s) Oral daily  metoprolol tartrate 25 milliGRAM(s) Oral every 12 hours  OXcarbazepine 150 milliGRAM(s) Oral two times a day  pantoprazole    Tablet 40 milliGRAM(s) Oral before breakfast  potassium chloride    Tablet ER 40 milliEquivalent(s) Oral once  sodium chloride 1 Gram(s) Oral every 8 hours  sodium chloride 3%. 500 milliLiter(s) (30 mL/Hr) IV Continuous <Continuous>    MEDICATIONS  (PRN):      FAMILY HISTORY:      Allergies    Tegretol (Hives; Rash)    Intolerances        SOCIAL HISTORY:    [  ] Smoking  [  ] EtOH  [  ] Substance abuse     Home Environment:  [  ] Alone  [X  ] Lives with Family--daughter and 2 grandchildren  [  ] Home Health Aide    Dwelling:  [ X ] Private House  [  ] Apartment  [  ] Adult Home/Assisted Living Facility  [  ] Skilled Nursing Facility      [  ] Short Term  [  ] Long Term  [ X ] Stairs 7 step entry, does not go upstairs      Elevator [  ]    FUNCTIONAL STATUS PTA: (Check all that apply)  Ambulation: [ X  ]Independent    [  ] Dependent     [  ] Non-Ambulatory  Assistive Device: [  ] Straight Cane  [  ]  Quad Cane  [ X ] Walker  [  ]  Wheelchair  ADL: [ X ] Independent  [  ]  Dependent       Vital Signs Last 24 Hrs  T(C): 36.1 (16 Oct 2022 08:00), Max: 36.2 (15 Oct 2022 16:30)  T(F): 97 (16 Oct 2022 08:00), Max: 97.2 (15 Oct 2022 16:30)  HR: 81 (16 Oct 2022 13:00) (65 - 85)  BP: 197/78 (16 Oct 2022 13:00) (112/56 - 197/78)  BP(mean): 115 (16 Oct 2022 13:00) (79 - 128)  RR: 19 (16 Oct 2022 13:00) (14 - 20)  SpO2: 100% (16 Oct 2022 13:00) (97% - 100%)    Parameters below as of 16 Oct 2022 13:00  Patient On (Oxygen Delivery Method): room air          PHYSICAL EXAM: Alert and oriented X 4  GENERAL: Well-developed, well-nourished, in NAD  HEAD:  Normocephalic, atraumatic  EYES: EOMI, PERRLA, sclerae anicteric, conjunctivae not injected  NECK: Supple, No JVD, Normal thyroid  CHEST/LUNG: Clear to auscultation bilaterally; No rales, rhonchi, wheezing  HEART: S1S2; No murmurs, gallops, or rubs  ABDOMEN: Soft, nontender, nondistended; Bowel sounds present  EXTREMITIES:  2+ Peripheral pulses; No clubbing, cyanosis, or edema    NERVOUS SYSTEM:  Cranial Nerves II-XII intact [ X ] Abnormal  [  ]  ROM: WFL all extremities [ X ]  Abnormal [  ]  Motor Strength: WFL all extremities  [  ]  Abnormal [X  ]  Sensation: intact to light touch [ X ] Abnormal [  ]  Reflexes: Symmetric [  ]  Abnormal [  ]    FUNCTIONAL STATUS:  Bed Mobility: Independent [  ]  Supervision [  ]  Needs Assistance [ X ]  N/A [  ]  Transfers: Independent [  ]  Supervision [  ]  Needs Assistance [X  ]  N/A [  ]   Ambulation: Independent [  ]  Supervision [  ]  Needs Assistance [ X ]  N/A [  ]  ADLs: Independent [  ] Requires Assistance [ X ] N/A [  ]      LABS:                        10.8   2.02  )-----------( 87       ( 15 Oct 2022 22:45 )             29.6     10-    123<L>  |  86<L>  |  7<L>  ----------------------------<  94  3.7   |  26  |  0.5<L>    Ca    8.3<L>      16 Oct 2022 11:29  Phos  3.5     10-  Mg     2.2     10-    TPro  6.4  /  Alb  3.8  /  TBili  0.8  /  DBili  x   /  AST  43<H>  /  ALT  17  /  AlkPhos  123<H>  10-15    PT/INR - ( 16 Oct 2022 06:03 )   PT: 17.10 sec;   INR: 1.48 ratio         PTT - ( 16 Oct 2022 06:03 )  PTT:38.1 sec  Urinalysis Basic - ( 15 Oct 2022 11:02 )    Color: Yellow / Appearance: Slightly Turbid / S.011 / pH: x  Gluc: x / Ketone: Negative  / Bili: Negative / Urobili: 3 mg/dL   Blood: x / Protein: 100 mg/dL / Nitrite: Negative   Leuk Esterase: Negative / RBC: 8 /HPF / WBC 5 /HPF   Sq Epi: x / Non Sq Epi: 1 /HPF / Bacteria: Many        RADIOLOGY & ADDITIONAL STUDIES:  < from: Xray Chest 1 View- PORTABLE-Routine (10.16.22 @ 06:22) >    ACC: [id number] EXAM:  XR CHEST PORTABLE ROUTINE 1V                          PROCEDURE DATE:  10/16/2022          INTERPRETATION:  Clinical History / Reason for exam: Chest pain    Comparison : Chest radiograph October 15, 2022.    Technique/Positioning: Single frontal chest x-ray obtained.    Findings:    Support devices: Telemetry leads    Cardiac/mediastinum/hilum: Unchanged.    Lung parenchyma/Pleura: No julieta consolidation, effusion or pneumothorax..    Skeleton/soft tissues: Unchanged.    Impression: No julieta consolidation, effusion or pneumothorax.    --- End of Report ---      < end of copied text >  < from: CT Angio Neck w/ IV Cont (10.15.22 @ 16:39) >    ACC: [id number] EXAM:  CT ANGIO NECK (W)AW IC                        ACC: [id number] EXAM:  CT ANGIO BRAIN (W)AW IC                          *** ADDENDUM***    Upon further review the hyperdensity in the right quadrigeminal plate   favors trace subarachnoid hemorrhage more than venous plexus. Recommend   short-term follow-up CT head.    --- End of Report ---    *** END OF ADDENDUM***      PROCEDURE DATE:  10/15/2022          INTERPRETATION:  CLINICAL INDICATION: Fall.    TECHNIQUE: CTA of the head and neck was performed after intravenous   contrast administration. 3-D reconstructions were performed, sagittal and   coronal reformats were submitted.    CAROTID STENOSIS REFERENCE: (NASCET = 100x1-(N/D)). N=greatest narrowing.   D=normal distal diameter - MILD = <50% stenosis. - MODERATE = 50-69%   stenosis. - SEVERE = 70-89% stenosis. - HAIRLINE/CRITICAL = 90-99%   stenosis. - OCCLUDED = 100% stenosis.    INTRAVENOUS CONTRAST: 95 cc Omnipaque administered..    COMPARISON: None available..    FINDINGS:    CTA HEAD/NECK:    AORTIC ARCH: Aortic arch and great vessels are grossly patent.    RIGHT ANTERIOR CIRCULATION:  Common carotid and external carotid arteries are patent. Mild calcific   atherosclerosis contributing to mild stenosis of the proximal cervical   ICA (30%). There is long segmental beading of the cervical ICA without   flow-limiting stenosis.  Extensive calcific atherosclerosis contributing to mild stenosis of the   supraclinoid ICA. Inferiorly projecting infundibulum measuring 1-2 mm   (601-129).    Mild stenoses in the opercular M3 segment, for instance series 4 image   213. The remaining MCA branches are patent.    There is mild irregular stenosis in the right A1 anterior cerebral artery   (TRISTON). The remaining anterior cerebral artery is patent.    LEFT ANTERIOR CIRCULATION:  There are scattered calcific plaques throughout the left CCA without   flow-limiting stenosis. There is mild calcific plaque in the origin of   the ECA resulting in mild stenosis. Mild calcific atherosclerosis   contributing to mild stenosis of the proximal cervical ICA (20%). Mild   beaded appearance of the cervical ICA.Extensive calcific atherosclerosis   in the carotid siphon contributing to mild stenosis in the cavernous and   supraclinoid ICA.    The MCA and TRISTON are patent.    POSTERIOR CIRCULATION:  Mild calcific atherosclerosis in bilateral V4 segments contributing to   mild stenosis bilaterally. Basilar artery is patent.  Bilateral posterior cerebral arteries are patent.    Pain dural venous sinuses. No vascular occlusion or high-grade stenosis.    OTHERS:  Questionable cluster of tiny vessels within the region of the right   quadrigeminal plate versus small subarachnoid hemorrhage as demonstrated   on concurrent CT head.    Severe degenerative changes of the spine.    Please see concurrent CT chest or intrathoracic examination.      IMPRESSION:    1.  Previously observed subarachnoid hemorrhage in the right   quadrigeminal plate is likely reflects venous plexus. Confirmation with   MRI can be obtained as indicated.    2.  Mild atherosclerotic stenosis in bilateral cervical ICAs (up to 30%)   and bilateral carotid siphons.    3.  Mild atherosclerotic stenosis in bilateral V4 vertebral arteries.        --- End of Report ---    ***Please see the addendum at the top of this report. It may contain   additional important information or changes.****      < end of copied text >  < from: Xray Pelvis AP only (10.15.22 @ 11:56) >    ACC: [id number] EXAM:  XR PELVIS AP ONLY 1-2 VIEWS                          PROCEDURE DATE:  10/15/2022          INTERPRETATION:  Clinical History / Reason for exam: Trauma.    Technique: Single view of AP pelvis obtained.    Comparison: None.    Findings:    No evidence of acute fracture or dislocation. Osteopenia. Degenerative   change of bilateral hips and lower lumbar spine.  Likely chronic L5 vertebral body compression fracture, post kyphoplasty.    Impression:    No evidence of acute fracture.      < end of copied text >  < from: CT Abdomen and Pelvis w/ IV Cont (10.15.22 @ 11:39) >    ACC: [id number] EXAM:  CT ABDOMEN AND PELVIS IC                        ACC: [id number] EXAM:  CT CHEST IC                          PROCEDURE DATE:  10/15/2022          INTERPRETATION:  CLINICAL HISTORY/REASON FOR EXAM: Trauma to chest,   abdomen and pelvis. Right flank pain. Right chest pain.    TECHNIQUE: Contiguous axial CT images were obtained from the thoracic   inlet to the pubic symphysis with IV contrast administration. Oral   contrast was not administered. Reformatted images in the coronal and   sagittal planes were acquired. 3D (MIP) images obtained.    COMPARISON: None.      FINDINGS:    CHEST:    LUNGS, PLEURA, AIRWAYS: Small bilateral pleural effusions with overlying   compressive atelectasis. Central airway patent. No pneumothorax.No lobar   consolidation.    THORACIC NODES: No mediastinal, hilar, supraclavicular, or axillary   lymphadenopathy.    MEDIASTINUM/GREAT VESSELS: No pericardial effusion. Cardiomegaly with   biatrial enlargement. Aortic and coronary artery calcifications. The   aorta and main pulmonary artery are of normal caliber.    ABDOMEN/PELVIS:    HEPATOBILIARY: Postcholecystectomy. No biliary dilation. No evidence of   liver injury.    SPLEEN: Unremarkable.    PANCREAS: Unremarkable.    ADRENAL GLANDS: Unremarkable.    KIDNEYS: Symmetric pattern of renal enhancement. No hydronephrosis   bilaterally. Left renal sinus and cortical cysts.    ABDOMINOPELVIC NODES: No lymphadenopathy.    PELVIC ORGANS: Unremarkable.    PERITONEUM/MESENTERY/BOWEL: No bowel obstruction. No pneumoperitoneum.   Trace ascites.    BONES/SOFT TISSUES: Acute, minimally displaced right lateral 7-10th rib   fractures; no pneumothorax. Evaluation limited by motion. Osteopenia.   Likely chronic L5 vertebral body compression fracture, post kyphoplasty.    OTHER: Vascular calcifications.      IMPRESSION:    1. Acute, minimally displaced right lateral 7-10th rib fractures; no   pneumothorax. Evaluation limited by motion.    2. No evidence of solid abdominal organ injury.    --- End of Report ---        < end of copied text >  < from: CT Abdomen and Pelvis w/ IV Cont (10.15.22 @ 11:39) >    ACC: [id number] EXAM:  CT ABDOMEN AND PELVIS IC                        ACC: [id number] EXAM:  CT CHEST IC                          PROCEDURE DATE:  10/15/2022          INTERPRETATION:  CLINICAL HISTORY/REASON FOR EXAM: Trauma to chest,   abdomen and pelvis. Right flank pain. Right chest pain.    TECHNIQUE: Contiguous axial CT images were obtained from the thoracic   inlet to the pubic symphysis with IV contrast administration. Oral   contrast was not administered. Reformatted images in the coronal and   sagittal planes were acquired. 3D (MIP) images obtained.    COMPARISON: None.      FINDINGS:    CHEST:    LUNGS, PLEURA, AIRWAYS: Small bilateral pleural effusions with overlying   compressive atelectasis. Central airway patent. No pneumothorax.No lobar   consolidation.    THORACIC NODES: No mediastinal, hilar, supraclavicular, or axillary   lymphadenopathy.    MEDIASTINUM/GREAT VESSELS: No pericardial effusion. Cardiomegaly with   biatrial enlargement. Aortic and coronary artery calcifications. The   aorta and main pulmonary artery are of normal caliber.    ABDOMEN/PELVIS:    HEPATOBILIARY: Postcholecystectomy. No biliary dilation. No evidence of   liver injury.    SPLEEN: Unremarkable.    PANCREAS: Unremarkable.    ADRENAL GLANDS: Unremarkable.    KIDNEYS: Symmetric pattern of renal enhancement. No hydronephrosis   bilaterally. Left renal sinus and cortical cysts.    ABDOMINOPELVIC NODES: No lymphadenopathy.    PELVIC ORGANS: Unremarkable.    PERITONEUM/MESENTERY/BOWEL: No bowel obstruction. No pneumoperitoneum.   Trace ascites.    BONES/SOFT TISSUES: Acute, minimally displaced right lateral 7-10th rib   fractures; no pneumothorax. Evaluation limited by motion. Osteopenia.   Likely chronic L5 vertebral body compression fracture, post kyphoplasty.    OTHER: Vascular calcifications.      IMPRESSION:    1. Acute, minimally displaced right lateral 7-10th rib fractures; no   pneumothorax. Evaluation limited by motion.    2. No evidence of solid abdominal organ injury.    --- End of Report ---        < end of copied text >

## 2022-10-16 NOTE — PROGRESS NOTE ADULT - SUBJECTIVE AND OBJECTIVE BOX
JANA ESCOBEDO  [id number]  87y Female    Indication for ICU admission:    Admit Date:10-15-22  ICU Date: 10-15-22  OR Date:     Tegretol (Hives; Rash)    PAST MEDICAL & SURGICAL HISTORY:  Atrial fibrillation  Hypertension  Hypothyroid  Anxiety  H/O colonoscopy - 10 yrs ago  History of cholecystectomy      Home Medications:  atorvastatin 10 mg oral tablet: 1 tab(s) orally once a day (15 Oct 2022 14:38)  Cymbalta 30 mg oral delayed release capsule: 1 cap(s) orally 2 times a day (15 Oct 2022 14:38)  levothyroxine 50 mcg (0.05 mg) oral tablet: 1 tab(s) orally once a day (20 Nov 2018 11:17)  losartan-hydrochlorothiazide 100 mg-12.5 mg oral tablet: 1 tab(s) orally once a day (15 Oct 2022 14:37)  metoprolol tartrate 25 mg oral tablet: orally once a day (20 Nov 2018 11:17)  Myrbetriq 50 mg oral tablet, extended release: 1 tab(s) orally once a day (15 Oct 2022 14:37)  Trileptal 150 mg oral tablet: 1 tab(s) orally 2 times a day (15 Oct 2022 14:39)  warfarin 5 mg oral tablet: 1 tab(s) orally once a day (20 Nov 2018 11:17)        24HRS EVENT:  10/15  Night  - Kcentra given  SBP 180s, labetalol 5mg x1  Na 120>116, started Nacl tabs, 3% @40cc  serum osm 241  mag, K repleted        DVT PTX: held    GI PTX:pantoprazole    Tablet 40 milliGRAM(s) Oral before breakfast      ***Tubes/Lines/Drains  ***  Peripheral IV  Central Venous Line     	Date   Arterial Line		                Date   [] PICC:         	[] Midline		[] Mediport             Urinary Catheter		Indication: Strict I&O    Date Placed:       REVIEW OF SYSTEMS    [x] A ten-point review of systems was otherwise negative except as noted.  [ ] Due to altered mental status/intubation, subjective information were not able to be obtained from the patient. History was obtained, to the extent possible, from review of the chart and collateral sources of information.         JANA ESCOBEDO  [id number]  87y Female    Indication for ICU admission:    Admit Date:10-15-22  ICU Date: 10-15-22  OR Date:     Tegretol (Hives; Rash)    PAST MEDICAL & SURGICAL HISTORY:  Atrial fibrillation  Hypertension  Hypothyroid  Anxiety  H/O colonoscopy - 10 yrs ago  History of cholecystectomy      Home Medications:  atorvastatin 10 mg oral tablet: 1 tab(s) orally once a day (15 Oct 2022 14:38)  Cymbalta 30 mg oral delayed release capsule: 1 cap(s) orally 2 times a day (15 Oct 2022 14:38)  levothyroxine 50 mcg (0.05 mg) oral tablet: 1 tab(s) orally once a day (20 Nov 2018 11:17)  losartan-hydrochlorothiazide 100 mg-12.5 mg oral tablet: 1 tab(s) orally once a day (15 Oct 2022 14:37)  metoprolol tartrate 25 mg oral tablet: orally once a day (20 Nov 2018 11:17)  Myrbetriq 50 mg oral tablet, extended release: 1 tab(s) orally once a day (15 Oct 2022 14:37)  Trileptal 150 mg oral tablet: 1 tab(s) orally 2 times a day (15 Oct 2022 14:39)  warfarin 5 mg oral tablet: 1 tab(s) orally once a day (20 Nov 2018 11:17)        24HRS EVENT:  10/15  Night  - Kcentra given  SBP 180s, labetalol 5mg x1  Na 120>116, started Nacl tabs, 3% @40cc  serum osm 241  mag, K repleted    Physical Exam:  Neuro- alert and oriented x 3, Pupils 3 mm and reactive, GCS 15  Resp- lungs clear B/L, no wheezing no crackles, no rhonchi  Cardiac- S1 S2, irregular rhythm  Abd- soft, NT, ND, + bowel sounds  MSK- ROM x 4, strength 5/5,       DVT PTX: held    GI PTX:pantoprazole    Tablet 40 milliGRAM(s) Oral before breakfast      ***Tubes/Lines/Drains  ***  Peripheral IV  Central Venous Line     	Date   Arterial Line		                Date   [] PICC:         	[] Midline		[] Mediport             Urinary Catheter		Indication: Strict I&O    Date Placed:       REVIEW OF SYSTEMS    [x] A ten-point review of systems was otherwise negative except as noted.  [ ] Due to altered mental status/intubation, subjective information were not able to be obtained from the patient. History was obtained, to the extent possible, from review of the chart and collateral sources of information.

## 2022-10-16 NOTE — PROGRESS NOTE ADULT - ASSESSMENT
Assessment & Plan    87y Female with PMH of HTN. HLD, Hypothyroid disease, Afib (on warfarin- last dose last night), mild myelodysplastic syndrome (on Vizada Q 28 days- last treatment 1 week ago), Trigeminal Neuralgia ( on Trileptal, prior gamma knife procedure), B/L knee Osteoarthritis- walker device dependent was brought in by EMS after an unwitnessed fall at home. Imaging shows small acute SAH within the right quadrigeminal plate cistern and right 7-10 rib fxs.     NEURO:   # Acute SAH  - Rpt Head CTA Head and Neck: right quadrigeminal plate   favors trace subarachnoid hemorrhage, mild athersclerotic stenosis  - Q1h Neuro checks  - Keppra vs home Trileptal for Sz ppx    # PMH Trigeminal Neuralgia  - On Trileptal at home    # PMH Neuropathy  - con't Cymbalta    #Pain-controlled     with Tylenol and Gabapentin    RESP:   # right7-10 rib fxs   - daily CXR  - incentive spirometry  - pain control      CARDS:   # A-fib, rate controlled  - con't Metoprolol 25 Q12h  -  home coumadin- held  - INR 2.1- reverse with Kcentra due to intra-cranial bleed  - Rpt EKG    # PMH of HTN  - con't home regimen, Metoprolol and Losartan/HCTZ    # PMH HLD  - con't Lipitor    Trop negative x 2, f/u 1 more set      GI/NUTR:   No acute issues  -NPO except meds for now      /RENAL:   # Hyponatremia  -Na+ 120- start NS @ 50 --> rpt Na 116; start 3% saline @ 40 + salt tabs  -Q6h Na+ monitoring    Voiding spontaneously    Labs:          BUN/Cr- 7/<0.5  -->,  7/<0.5  -->          Electrolytes-Na 116 // K 3.7 // Mg 1.5 //  Phos 3.5 (10-16 @ 00:57)    HEME/ONC:   # PMH of Myelodysplastic syndrome  - receives Vizada Q28 days, last treatment 1 week ago    # Leukopenia- WBC 1.83  - likely secondary to MDS treatment  - Hematology consult for further recs      Labs: Hb/Hct:  11.3/31.2  -->,  10.8/29.6  -->                      Plts:  115  -->,  87  -->                 PTT/INR:  29.6/2.14  --->,  --/2.24  --->         ID:   WBC- 1.83  --->>,  2.02  --->>  Temp trend- 24hrs T(F): 96.5 (10-16 @ 00:00), Max: 97.2 (10-15 @ 16:30)  -No signs of infection  -UA- negative  -will follow with Hematology for low WBC      ENDO:  # PMH Hypothyroidism  resume Synthroid 50 mcg QD    # Glucose monitoring  POCT Blood Glucose.: 105 mg/dL (15 Oct 2022 10:23)  -105 mg/dL (10-15 @ 10:23)  - Q6h FS while NPO    DVT ppx:   chemo ppx held due to intra-cranial bleed  SCDs to lower ext      GI ppx:  Protonix      LINES/DRAINS: PIV,     DISPO: SICU  Assessment & Plan    87y Female with PMH of HTN. HLD, Hypothyroid disease, Afib (on warfarin- last dose last night), mild myelodysplastic syndrome (on Vizada Q 28 days- last treatment 1 week ago), Trigeminal Neuralgia ( on Trileptal, prior gamma knife procedure), B/L knee Osteoarthritis- walker device dependent was brought in by EMS after an unwitnessed fall at home. Imaging shows small acute SAH within the right quadrigeminal plate cistern and right 7-10 rib fxs.     NEURO:   # Acute SAH  - Rpt Head CTA Head and Neck: right quadrigeminal plate   favors trace subarachnoid hemorrhage, mild athersclerotic stenosis  - Q1h Neuro checks  - Keppra vs home Trileptal for Sz ppx    # PMH Trigeminal Neuralgia  - On Trileptal at home    # PMH Neuropathy  - con't Cymbalta    #Pain-controlled     with Tylenol and Gabapentin    RESP:   # right7-10 rib fxs   - daily CXR  - incentive spirometry  - pain control      CARDS:   # A-fib, rate controlled  - con't Metoprolol 25 Q12h  -  home coumadin- held  - INR 2.1- reverse with Kcentra due to intra-cranial bleed  - Rpt EKG    # PMH of HTN  - con't home regimen, Metoprolol and Losartan/HCTZ    # PMH HLD  - con't Lipitor    Trop negative x 2, f/u 1 more set      GI/NUTR:   No acute issues  -NPO except meds for now      /RENAL:   # Hyponatremia  -Na+ 120- start NS @ 50 --> rpt Na 116; start 3% saline @ 40 + salt tabs  -Q6h Na+ monitoring    Voiding spontaneously    Labs:          BUN/Cr- 7/<0.5  -->,  7/<0.5  -->          Electrolytes-Na 116 // K 3.7 // Mg 1.5 //  Phos 3.5 (10-16 @ 00:57)    HEME/ONC:   # PMH of Myelodysplastic syndrome  - receives Vizada Q28 days, last treatment 1 week ago    # Leukopenia- WBC 1.83  - likely secondary to MDS treatment  - Hematology consult for further recs      Labs: Hb/Hct:  11.3/31.2  -->,  10.8/29.6  -->                      Plts:  115  -->,  87  -->                 PTT/INR:  29.6/2.14  --->,  --/2.24  --->         ID:   WBC- 1.83  --->>,  2.02  --->>  Temp trend- 24hrs T(F): 96.5 (10-16 @ 00:00), Max: 97.2 (10-15 @ 16:30)  -No signs of infection  -UA- negative  -will follow with Hematology for low WBC      ENDO:  # PMH Hypothyroidism  resume Synthroid 50 mcg QD    # Glucose monitoring  POCT Blood Glucose.: 105 mg/dL (15 Oct 2022 10:23)  -105 mg/dL (10-15 @ 10:23)  - Q6h FS while NPO    DVT ppx:   chemo ppx held due to intra-cranial bleed  SCDs to lower ext      GI ppx:  Protonix.        LINES/DRAINS: PIV,     DISPO: SICU

## 2022-10-17 LAB
ANION GAP SERPL CALC-SCNC: 11 MMOL/L — SIGNIFICANT CHANGE UP (ref 7–14)
ANION GAP SERPL CALC-SCNC: 6 MMOL/L — LOW (ref 7–14)
ANION GAP SERPL CALC-SCNC: 7 MMOL/L — SIGNIFICANT CHANGE UP (ref 7–14)
BUN SERPL-MCNC: 10 MG/DL — SIGNIFICANT CHANGE UP (ref 10–20)
BUN SERPL-MCNC: 10 MG/DL — SIGNIFICANT CHANGE UP (ref 10–20)
BUN SERPL-MCNC: 12 MG/DL — SIGNIFICANT CHANGE UP (ref 10–20)
CALCIUM SERPL-MCNC: 8.1 MG/DL — LOW (ref 8.4–10.4)
CALCIUM SERPL-MCNC: 8.3 MG/DL — LOW (ref 8.4–10.5)
CALCIUM SERPL-MCNC: 8.4 MG/DL — SIGNIFICANT CHANGE UP (ref 8.4–10.5)
CHLORIDE SERPL-SCNC: 100 MMOL/L — SIGNIFICANT CHANGE UP (ref 98–110)
CHLORIDE SERPL-SCNC: 103 MMOL/L — SIGNIFICANT CHANGE UP (ref 98–110)
CHLORIDE SERPL-SCNC: 105 MMOL/L — SIGNIFICANT CHANGE UP (ref 98–110)
CO2 SERPL-SCNC: 21 MMOL/L — SIGNIFICANT CHANGE UP (ref 17–32)
CO2 SERPL-SCNC: 23 MMOL/L — SIGNIFICANT CHANGE UP (ref 17–32)
CO2 SERPL-SCNC: 24 MMOL/L — SIGNIFICANT CHANGE UP (ref 17–32)
CREAT SERPL-MCNC: 0.5 MG/DL — LOW (ref 0.7–1.5)
EGFR: 92 ML/MIN/1.73M2 — SIGNIFICANT CHANGE UP
GLUCOSE SERPL-MCNC: 115 MG/DL — HIGH (ref 70–99)
GLUCOSE SERPL-MCNC: 95 MG/DL — SIGNIFICANT CHANGE UP (ref 70–99)
GLUCOSE SERPL-MCNC: 97 MG/DL — SIGNIFICANT CHANGE UP (ref 70–99)
HAPTOGLOB SERPL-MCNC: <20 MG/DL — LOW (ref 34–200)
HCT VFR BLD CALC: 28.4 % — LOW (ref 37–47)
HGB BLD-MCNC: 9.8 G/DL — LOW (ref 12–16)
INR BLD: 1.73 RATIO — HIGH (ref 0.65–1.3)
MAGNESIUM SERPL-MCNC: 1.8 MG/DL — SIGNIFICANT CHANGE UP (ref 1.8–2.4)
MCHC RBC-ENTMCNC: 34.1 PG — HIGH (ref 27–31)
MCHC RBC-ENTMCNC: 34.5 G/DL — SIGNIFICANT CHANGE UP (ref 32–37)
MCV RBC AUTO: 99 FL — SIGNIFICANT CHANGE UP (ref 81–99)
NRBC # BLD: 0 /100 WBCS — SIGNIFICANT CHANGE UP (ref 0–0)
OSMOLALITY SERPL: 282 MOS/KG — SIGNIFICANT CHANGE UP (ref 280–301)
PHOSPHATE SERPL-MCNC: 2.6 MG/DL — SIGNIFICANT CHANGE UP (ref 2.1–4.9)
PLATELET # BLD AUTO: 86 K/UL — LOW (ref 130–400)
POTASSIUM SERPL-MCNC: 4.8 MMOL/L — SIGNIFICANT CHANGE UP (ref 3.5–5)
POTASSIUM SERPL-MCNC: 5 MMOL/L — SIGNIFICANT CHANGE UP (ref 3.5–5)
POTASSIUM SERPL-MCNC: 5.2 MMOL/L — HIGH (ref 3.5–5)
POTASSIUM SERPL-SCNC: 4.8 MMOL/L — SIGNIFICANT CHANGE UP (ref 3.5–5)
POTASSIUM SERPL-SCNC: 5 MMOL/L — SIGNIFICANT CHANGE UP (ref 3.5–5)
POTASSIUM SERPL-SCNC: 5.2 MMOL/L — HIGH (ref 3.5–5)
PROTHROM AB SERPL-ACNC: 20 SEC — HIGH (ref 9.95–12.87)
RBC # BLD: 2.87 M/UL — LOW (ref 4.2–5.4)
RBC # FLD: 14.2 % — SIGNIFICANT CHANGE UP (ref 11.5–14.5)
SODIUM SERPL-SCNC: 130 MMOL/L — LOW (ref 135–146)
SODIUM SERPL-SCNC: 135 MMOL/L — SIGNIFICANT CHANGE UP (ref 135–146)
SODIUM SERPL-SCNC: 135 MMOL/L — SIGNIFICANT CHANGE UP (ref 135–146)
WBC # BLD: 1.96 K/UL — LOW (ref 4.8–10.8)
WBC # FLD AUTO: 1.96 K/UL — LOW (ref 4.8–10.8)

## 2022-10-17 PROCEDURE — 71045 X-RAY EXAM CHEST 1 VIEW: CPT | Mod: 26

## 2022-10-17 PROCEDURE — 99222 1ST HOSP IP/OBS MODERATE 55: CPT

## 2022-10-17 PROCEDURE — 99233 SBSQ HOSP IP/OBS HIGH 50: CPT

## 2022-10-17 RX ORDER — ALPRAZOLAM 0.25 MG
0.25 TABLET ORAL ONCE
Refills: 0 | Status: DISCONTINUED | OUTPATIENT
Start: 2022-10-17 | End: 2022-10-18

## 2022-10-17 RX ORDER — MAGNESIUM SULFATE 500 MG/ML
2 VIAL (ML) INJECTION ONCE
Refills: 0 | Status: COMPLETED | OUTPATIENT
Start: 2022-10-17 | End: 2022-10-17

## 2022-10-17 RX ORDER — CALCIUM GLUCONATE 100 MG/ML
2 VIAL (ML) INTRAVENOUS ONCE
Refills: 0 | Status: COMPLETED | OUTPATIENT
Start: 2022-10-17 | End: 2022-10-17

## 2022-10-17 RX ORDER — DEXTROSE 50 % IN WATER 50 %
50 SYRINGE (ML) INTRAVENOUS ONCE
Refills: 0 | Status: COMPLETED | OUTPATIENT
Start: 2022-10-17 | End: 2022-10-17

## 2022-10-17 RX ORDER — ALPRAZOLAM 0.25 MG
0.25 TABLET ORAL ONCE
Refills: 0 | Status: DISCONTINUED | OUTPATIENT
Start: 2022-10-17 | End: 2022-10-17

## 2022-10-17 RX ORDER — SODIUM CHLORIDE 5 G/100ML
500 INJECTION, SOLUTION INTRAVENOUS
Refills: 0 | Status: DISCONTINUED | OUTPATIENT
Start: 2022-10-17 | End: 2022-10-17

## 2022-10-17 RX ORDER — INSULIN HUMAN 100 [IU]/ML
10 INJECTION, SOLUTION SUBCUTANEOUS ONCE
Refills: 0 | Status: COMPLETED | OUTPATIENT
Start: 2022-10-17 | End: 2022-10-17

## 2022-10-17 RX ADMIN — Medication 25 MILLIGRAM(S): at 17:00

## 2022-10-17 RX ADMIN — HEPARIN SODIUM 5000 UNIT(S): 5000 INJECTION INTRAVENOUS; SUBCUTANEOUS at 21:18

## 2022-10-17 RX ADMIN — SODIUM CHLORIDE 1 GRAM(S): 9 INJECTION INTRAMUSCULAR; INTRAVENOUS; SUBCUTANEOUS at 13:59

## 2022-10-17 RX ADMIN — LIDOCAINE 1 PATCH: 4 CREAM TOPICAL at 21:17

## 2022-10-17 RX ADMIN — SODIUM CHLORIDE 30 MILLILITER(S): 5 INJECTION, SOLUTION INTRAVENOUS at 05:04

## 2022-10-17 RX ADMIN — Medication 650 MILLIGRAM(S): at 05:04

## 2022-10-17 RX ADMIN — ATORVASTATIN CALCIUM 10 MILLIGRAM(S): 80 TABLET, FILM COATED ORAL at 21:18

## 2022-10-17 RX ADMIN — Medication 50 MICROGRAM(S): at 05:05

## 2022-10-17 RX ADMIN — DULOXETINE HYDROCHLORIDE 30 MILLIGRAM(S): 30 CAPSULE, DELAYED RELEASE ORAL at 11:01

## 2022-10-17 RX ADMIN — Medication 650 MILLIGRAM(S): at 11:01

## 2022-10-17 RX ADMIN — SODIUM CHLORIDE 1 GRAM(S): 9 INJECTION INTRAMUSCULAR; INTRAVENOUS; SUBCUTANEOUS at 21:18

## 2022-10-17 RX ADMIN — Medication 25 MILLIGRAM(S): at 05:05

## 2022-10-17 RX ADMIN — HEPARIN SODIUM 5000 UNIT(S): 5000 INJECTION INTRAVENOUS; SUBCUTANEOUS at 05:04

## 2022-10-17 RX ADMIN — LIDOCAINE 1 PATCH: 4 CREAM TOPICAL at 07:47

## 2022-10-17 RX ADMIN — Medication 650 MILLIGRAM(S): at 00:27

## 2022-10-17 RX ADMIN — PANTOPRAZOLE SODIUM 40 MILLIGRAM(S): 20 TABLET, DELAYED RELEASE ORAL at 06:05

## 2022-10-17 RX ADMIN — LOSARTAN POTASSIUM 100 MILLIGRAM(S): 100 TABLET, FILM COATED ORAL at 05:06

## 2022-10-17 RX ADMIN — SODIUM CHLORIDE 1 GRAM(S): 9 INJECTION INTRAMUSCULAR; INTRAVENOUS; SUBCUTANEOUS at 05:05

## 2022-10-17 RX ADMIN — HEPARIN SODIUM 5000 UNIT(S): 5000 INJECTION INTRAVENOUS; SUBCUTANEOUS at 13:59

## 2022-10-17 RX ADMIN — Medication 650 MILLIGRAM(S): at 17:00

## 2022-10-17 RX ADMIN — OXCARBAZEPINE 150 MILLIGRAM(S): 300 TABLET, FILM COATED ORAL at 05:05

## 2022-10-17 RX ADMIN — GABAPENTIN 200 MILLIGRAM(S): 400 CAPSULE ORAL at 05:05

## 2022-10-17 NOTE — PROGRESS NOTE ADULT - ASSESSMENT
Assessment & Plan    87y Female with PMH of HTN. HLD, Hypothyroid disease, Afib (on warfarin- last dose last night), mild myelodysplastic syndrome (on Vizada Q 28 days- last treatment 1 week ago), Trigeminal Neuralgia ( on Trileptal, prior gamma knife procedure), B/L knee Osteoarthritis- walker device dependent was brought in by EMS after an unwitnessed fall at home. Imaging shows small acute SAH within the right quadrigeminal plate cistern and right 7-10 rib fxs.     NEURO:   # Acute SAH  - Rpt Head CTA Head and Neck: hyperdensity in quadrigeminal plate favors small SAH      more than venous plexus      mild atheroslerotic stenosis in B/L cervical ICA's and B/L carotid siphons- mild          atherosclerotic stenosis in B/L V4 vertebral arteries.  - NSGY- no interventions, no further imaging needed unless exam changes        Obtain Neuro IR consult- pending,         Q4h neuro checks        can start HSQ, can resume Coumadin in 1 week (10/22/22)  - con't home Trileptal for Sz ppx    # PMH Trigeminal Neuralgia  - On Trileptal at home    # PMH Neuropathy  - con't Cymbalta    #Pain-controlled     with Tylenol,  Gabapentin, and Lidocaine patch    RESP:   # right7-10 rib fxs   - daily CXR  - incentive spirometry- pulling 750   - pain control      CARDS:   # A-fib, rate controlled  - con't Metoprolol 25 Q12h  -  home coumadin- held- can resume in 1 week as per NSGY  - INR 2.1 and 2.4- Coumading  reversed with Kcentra due to intra-cranial bleed  - INR trend: 2.1--> 2.4-->1.48 > 1.73  - Daily EKG    # PMH of HTN  - con't home regimen, Metoprolol and Losartan  - HCTZ held due to hyponatremia    # PMH HLD  - con't Lipitor    Trop negative x 3      GI/NUTR:   No acute issues  -NPO except meds for now      /RENAL:   # Hyponatremia- improving  - hypertonic saline @ 30 mL/hr and 1 gm Q8h salt tabs  - serum Na+ trend: 120--> 116--> 125-->123-->123 > 125  - urine Na- 100, urine osm- 384, serum osm- 253  - Q6h Na+ monitoring    Voiding spontaneously    Labs:          BUN/Cr- 7/<0.5  -->6/0.5-->  7/<0.5  -->9/0.5--> 11/0.6          Electrolytes-Na 125 // K 4.4 // Mg 2.0 //  Phos 3.1 10-16 @ 22:26    HEME/ONC:   # PMH of Myelodysplastic syndrome  - receives Vizada Q28 days, last treatment 1 week ago      # Leukopenia- WBC 2.0 , improving  - likely secondary to MDS treatment  - Hematology consult appreciated- nothing to do  - con't to trend      Labs: Hb/Hct:  11.3/31.2  -->,  10.8/29.6  -->,  10.2/28.9  -->          Plts:  115  -->,  87  -->,  86  -->              INR:  2.1--> 2.4--> Kcentra--> 1.48 > 1.73      ID:   WBC- 1.83  --->>,  2.02  --->> 1.86  afebrile  -No signs of infection  -UA- with + bacteria, no leuk, no nitrites- Ceftriaxone x 1 given in the ED  -Repeat UA +lg blood     ENDO:  # PMH Hypothyroidism  rcon't Synthroid 50 mcg QD    # Glucose monitoring  - Q6h FS   -     DVT ppx:   HSQ Q8h  SCDs to lower ext      GI ppx:  Protonix      LINES/DRAINS: PIV,     DISPO: SICU

## 2022-10-17 NOTE — CONSULT NOTE ADULT - ASSESSMENT
Patient is a 88 y/o female with PMHx of MDS, HTN, HLD, A-Fib ( On Coumadin 5mg daily), hypothyroid, neuropathy of B/L LE, Trigeminal Neuralgia ( Prior Gamma knife procedure for pain), B/L OA of the knees, b/l LE polyneuropathy prior CCY who presented to [facility] s/p fall at home. Patient was ambulating at home with the walker when she fell onto the right side. She doesn't have the best recollection of the event but denies LOC. She notes dizziness notable after fall but not prior. With the dizziness she gets occasional nausea. Prior to fall she denies any change in medications, new medications, recent illness, nor any other features out different from her baseline .      Neurology consulted for medication management for her trigeminalgia, polyneuropathy. Per patient, she had gamma-knife surgery for her L-sided trigeminal neuralgia and she was getting ASM (CBZ which causes rash) then she was taking Gabapentin w/o significant effect, finally she was put on Trileptal 600 mg bid which she was taking for the last years. Her last time trigeminal pain was 3-4 years ago. The day prior of admission she fell at home w/o hitting her head, LOC. On admission she was found having SDH on R quadrigeminal area. Her coumadin was held w starting Heparin. Her Home meds were Trileptal for neuralgia, and Symbalta for b/l LE polyneuropathy.       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 Taking into account her PMH of trigeminal neuralgia well controlled after gamma-knife surgery and trileptal and not recurrent prosopalgia for the last 3-4 years, the possible side effect of Trileptal for hyponatiemia, we would recommend to held Trileptal now. Needs MRI brain w/o contrast to r/o SDH of other structural abnormality on midbrain region.     Recommendations:   1. D/c Trileptal   2. D/c Gabapentin for now  3. Continue with Symbalta 30 mg/day for polyneuropathy  4. Please obtain MRI brain w/o contrast  5. Please re-consult if trigeminal neuralgia/facial pain re-occur.             The case was discussed w. Dr. Arndt  Patient is a 86 y/o female with PMHx of MDS, HTN, HLD, A-Fib ( On Coumadin 5mg daily), hypothyroid, neuropathy of B/L LE, Trigeminal Neuralgia ( Prior Gamma knife procedure for pain), B/L OA of the knees, b/l LE polyneuropathy prior CCY who presented to [facility] s/p fall at home w/ ? LOC and persistent dizziness found to have ? R cerebellar SAH possibly traumatic.  incidentally noted to have hyponatremia possibly secondary to prolonged oxcarbazepine use.   Exam w/ vertical/horizontal nystagmus with mild dysmetria LUE/LLE r/o structural etiology.  In meantime, may d/c oxcarbazepine since last TGN flare up > 3 years ago.       Recommendations:   1. D/c Trileptal   2. D/c Gabapentin for now  3. Continue with Cymbalta 30 mg/day for polyneuropathy  4. Please obtain MRI brain w/o contrast  5. Please re-consult if trigeminal neuralgia/facial pain re-occur.  6. Rest of SAH/trauma workup per neuroendovascular, neurosurgery and trauma.             The case was discussed w. Dr. Arndt

## 2022-10-17 NOTE — CONSULT NOTE ADULT - SUBJECTIVE AND OBJECTIVE BOX
NEUROLOGY CONSULT    HPI:  Patient is a 86 y/o female with PMHx of MDS ( on Vidaza- gets every 28 days- just finished course- due in around 3 weeks) HTN, HLD, A-Fib ( On Coumadin 5mg daily), hypothyroid, neuropathy of B/L LE, Trigeminal Neuralgia ( Prior Gamma knife procedure for pain), B/L OA of the knees, prior CCY who presented to [facility] s/p fall at home. Patient was ambulating at home with the walker when she fell onto the right side. She doesn't have the best recollection of the event but denies LOC. She has a home health aide and her grandson was also home at the time. She was able to stand and walk but presented as notes ongoing dizziness and pain over her right chest. Pain over the right side is dull, worse with motion, but not sharp. She notes dizziness notable after fall but not prior. With the dizziness she gets occasional nausea. Prior to fall she denies any change in medications, new medications, recent illness, nor any other features out different from her baseline .  (15 Oct 2022 14:26)     MEDICATIONS  Home Medications:  atorvastatin 10 mg oral tablet: 1 tab(s) orally once a day (15 Oct 2022 14:38)  Cymbalta 30 mg oral delayed release capsule: 1 cap(s) orally 2 times a day (15 Oct 2022 14:38)  levothyroxine 50 mcg (0.05 mg) oral tablet: 1 tab(s) orally once a day (2018 11:17)  losartan-hydrochlorothiazide 100 mg-12.5 mg oral tablet: 1 tab(s) orally once a day (15 Oct 2022 14:37)  metoprolol tartrate 25 mg oral tablet: orally once a day (2018 11:17)  Myrbetriq 50 mg oral tablet, extended release: 1 tab(s) orally once a day (15 Oct 2022 14:37)  Trileptal 150 mg oral tablet: 1 tab(s) orally 2 times a day (15 Oct 2022 14:39)  warfarin 5 mg oral tablet: 1 tab(s) orally once a day (2018 11:17)    MEDICATIONS  (STANDING):  acetaminophen     Tablet .. 650 milliGRAM(s) Oral every 6 hours  atorvastatin 10 milliGRAM(s) Oral at bedtime  DULoxetine 30 milliGRAM(s) Oral daily  gabapentin 200 milliGRAM(s) Oral every 8 hours  heparin   Injectable 5000 Unit(s) SubCutaneous every 8 hours  levothyroxine 50 MICROGram(s) Oral daily  lidocaine   4% Patch 1 Patch Transdermal every 24 hours  losartan 100 milliGRAM(s) Oral daily  metoprolol tartrate 25 milliGRAM(s) Oral every 12 hours  pantoprazole    Tablet 40 milliGRAM(s) Oral before breakfast  sodium chloride 1 Gram(s) Oral every 8 hours  sodium chloride 3%. 500 milliLiter(s) (15 mL/Hr) IV Continuous <Continuous>    MEDICATIONS  (PRN):      FAMILY HISTORY:    SOCIAL HISTORY: negative for tobacco, alcohol, or ilicit drug use.    Allergies    Tegretol (Hives; Rash)    Intolerances        GEN: NAD, pleasant, cooperative    NEURO:   MENTAL STATUS: AAOx3  LANG/SPEECH: Fluent, intact naming, repetition & comprehension  CRANIAL NERVES:  II: Pupils equal and reactive, no RAPD, normal visual field and fundus  III, IV, VI: EOM intact, no gaze preference or deviation  V: normal  VII: no facial asymmetry  VIII: normal hearing to speech  MOTOR: 5/5 in both upper and lower extremities  REFLEXES: 2/4 throughout, bilateral flexor plantars  SENSORY: Normal to touch, temperature & pin prick in all extremiteis  COORD: Normal finger to nose and heel to shin, no tremor, no dysmetria  Gait:   NIHSS: **** ASPECT Score: ***** ICH Score: ****** (GCS)    LABS:                        10.2   1.86  )-----------( 86       ( 16 Oct 2022 22:26 )             28.9     10-17    130<L>  |  100  |  10  ----------------------------<  95  4.8   |  24  |  0.5<L>    Ca    8.1<L>      17 Oct 2022 04:30  Phos  3.1     10-16  Mg     2.0     10-16      Hemoglobin A1C:   Vitamin B12   PT/INR - ( 16 Oct 2022 22:26 )   PT: 20.00 sec;   INR: 1.73 ratio         PTT - ( 16 Oct 2022 22:26 )  PTT:36.9 sec  CAPILLARY BLOOD GLUCOSE      POCT Blood Glucose.: 94 mg/dL (16 Oct 2022 07:31)      Urinalysis Basic - ( 16 Oct 2022 16:10 )    Color: Yellow / Appearance: Slightly Turbid / S.029 / pH: x  Gluc: x / Ketone: Moderate  / Bili: Negative / Urobili: 3 mg/dL   Blood: x / Protein: 100 mg/dL / Nitrite: Negative   Leuk Esterase: Negative / RBC: 55 /HPF / WBC 6 /HPF   Sq Epi: x / Non Sq Epi: 0 /HPF / Bacteria: Negative            Microbiology:      RADIOLOGY, EKG AND ADDITIONAL TESTS: Reviewed.           NEUROLOGY CONSULT    HPI:  Patient is a 86 y/o female with PMHx of MDS ( on Vidaza- gets every 28 days- just finished course- due in around 3 weeks) HTN, HLD, A-Fib ( On Coumadin 5mg daily), hypothyroid, neuropathy of B/L LE, Trigeminal Neuralgia ( Prior Gamma knife procedure for pain), B/L OA of the knees, prior CCY who presented to [facility] s/p fall at home. Patient was ambulating at home with the walker when she fell onto the right side. She doesn't have the best recollection of the event but denies LOC. She has a home health aide and her grandson was also home at the time. She was able to stand and walk but presented as notes ongoing dizziness and pain over her right chest. Pain over the right side is dull, worse with motion, but not sharp. She notes dizziness notable after fall but not prior. With the dizziness she gets occasional nausea. Prior to fall she denies any change in medications, new medications, recent illness, nor any other features out different from her baseline .  (15 Oct 2022 14:26)    Neurology consulted for medication management for her trigeminalgia, polyneuropathy. Per patient, she had gamma-knife surgery for her L-sided trigeminal neuralgia and she was getting ASM (CBZ which causes rash) then she was taking Gabapentin w/o significant effect, finally she was put on Trileptal 600 mg bid which she was taking for the last years. Her last time trigeminal pain was 3-4 years ago. The day prior of admission she fell at home w/o hitting her head, LOC. On admission she was found having SDH on R quadrigeminal area. Her coumadin was held w starting Heparin. Her Home meds were Trileptal for neuralgia, and Symbalta for b/l LE polyneuropathy.          MEDICATIONS  Home Medications:  atorvastatin 10 mg oral tablet: 1 tab(s) orally once a day (15 Oct 2022 14:38)  Cymbalta 30 mg oral delayed release capsule: 1 cap(s) orally 2 times a day (15 Oct 2022 14:38)  levothyroxine 50 mcg (0.05 mg) oral tablet: 1 tab(s) orally once a day (2018 11:17)  losartan-hydrochlorothiazide 100 mg-12.5 mg oral tablet: 1 tab(s) orally once a day (15 Oct 2022 14:37)  metoprolol tartrate 25 mg oral tablet: orally once a day (2018 11:17)  Myrbetriq 50 mg oral tablet, extended release: 1 tab(s) orally once a day (15 Oct 2022 14:37)  Trileptal 150 mg oral tablet: 1 tab(s) orally 2 times a day (15 Oct 2022 14:39)  warfarin 5 mg oral tablet: 1 tab(s) orally once a day (2018 11:17)    MEDICATIONS  (STANDING):  acetaminophen     Tablet .. 650 milliGRAM(s) Oral every 6 hours  atorvastatin 10 milliGRAM(s) Oral at bedtime  DULoxetine 30 milliGRAM(s) Oral daily  gabapentin 200 milliGRAM(s) Oral every 8 hours  heparin   Injectable 5000 Unit(s) SubCutaneous every 8 hours  levothyroxine 50 MICROGram(s) Oral daily  lidocaine   4% Patch 1 Patch Transdermal every 24 hours  losartan 100 milliGRAM(s) Oral daily  metoprolol tartrate 25 milliGRAM(s) Oral every 12 hours  pantoprazole    Tablet 40 milliGRAM(s) Oral before breakfast  sodium chloride 1 Gram(s) Oral every 8 hours  sodium chloride 3%. 500 milliLiter(s) (15 mL/Hr) IV Continuous <Continuous>    MEDICATIONS  (PRN):      FAMILY HISTORY:    SOCIAL HISTORY: negative for tobacco, alcohol, or ilicit drug use.    Allergies    Tegretol (Hives; Rash)    Intolerances        GEN: NAD, pleasant, cooperative    NEURO:   MENTAL STATUS: AAOx3  LANG/SPEECH: Fluent, intact naming, repetition & comprehension  CRANIAL NERVES:  II: Pupils equal and reactive, no RAPD, normal visual field and fundus  III, IV, VI: EOM intact, no gaze preference or deviation  V: normal  VII: flattened L n/l fold  VIII: normal hearing to speech  MOTOR: 5/5 in both upper and lower extremities  REFLEXES: 2/4 throughout, bilateral flexor plantars  SENSORY: Normal to touch, temperature & pin prick in all extremiteis  COORD: Normal finger to nose and heel to shin, no tremor, no dysmetria    LABS:                        10.2   1.86  )-----------( 86       ( 16 Oct 2022 22:26 )             28.9     10-17    130<L>  |  100  |  10  ----------------------------<  95  4.8   |  24  |  0.5<L>    Ca    8.1<L>      17 Oct 2022 04:30  Phos  3.1     10-16  Mg     2.0     10-16      Hemoglobin A1C:   Vitamin B12   PT/INR - ( 16 Oct 2022 22:26 )   PT: 20.00 sec;   INR: 1.73 ratio         PTT - ( 16 Oct 2022 22:26 )  PTT:36.9 sec  CAPILLARY BLOOD GLUCOSE      POCT Blood Glucose.: 94 mg/dL (16 Oct 2022 07:31)      Urinalysis Basic - ( 16 Oct 2022 16:10 )    Color: Yellow / Appearance: Slightly Turbid / S.029 / pH: x  Gluc: x / Ketone: Moderate  / Bili: Negative / Urobili: 3 mg/dL   Blood: x / Protein: 100 mg/dL / Nitrite: Negative   Leuk Esterase: Negative / RBC: 55 /HPF / WBC 6 /HPF   Sq Epi: x / Non Sq Epi: 0 /HPF / Bacteria: Negative            Microbiology:      RADIOLOGY, EKG AND ADDITIONAL TESTS: Reviewed.           NEUROLOGY CONSULT    HPI:  Patient is a 88 y/o female with PMHx of MDS ( on Vidaza- gets every 28 days- just finished course- due in around 3 weeks) HTN, HLD, A-Fib ( On Coumadin 5mg daily), hypothyroid, neuropathy of B/L LE, Trigeminal Neuralgia ( Prior Gamma knife procedure for pain), B/L OA of the knees, prior CCY who presented to [facility] s/p fall at home. Patient was ambulating at home with the walker when she fell onto the right side. She doesn't have the best recollection of the event but denies LOC. She has a home health aide and her grandson was also home at the time. She was able to stand and walk but presented as notes ongoing dizziness and pain over her right chest. Pain over the right side is dull, worse with motion, but not sharp. She notes dizziness notable after fall but not prior. With the dizziness she gets occasional nausea. Prior to fall she denies any change in medications, new medications, recent illness, nor any other features out different from her baseline .  (15 Oct 2022 14:26)    Neurology consulted for medication management for her trigeminalgia, polyneuropathy. Per patient, she had gamma-knife surgery for her L-sided trigeminal neuralgia and she was getting ASM (CBZ which causes rash) then she was taking Gabapentin w/o significant effect, finally she was put on Trileptal 600 mg bid which she was taking for the last years. Her last time trigeminal pain was 3-4 years ago. The day prior of admission she fell at home w/o hitting her head, LOC. On admission she was found having SDH on R quadrigeminal area. Her coumadin was held w starting Heparin. Her Home meds were Trileptal for neuralgia, and Symbalta for b/l LE polyneuropathy.          MEDICATIONS  Home Medications:  atorvastatin 10 mg oral tablet: 1 tab(s) orally once a day (15 Oct 2022 14:38)  Cymbalta 30 mg oral delayed release capsule: 1 cap(s) orally 2 times a day (15 Oct 2022 14:38)  levothyroxine 50 mcg (0.05 mg) oral tablet: 1 tab(s) orally once a day (2018 11:17)  losartan-hydrochlorothiazide 100 mg-12.5 mg oral tablet: 1 tab(s) orally once a day (15 Oct 2022 14:37)  metoprolol tartrate 25 mg oral tablet: orally once a day (2018 11:17)  Myrbetriq 50 mg oral tablet, extended release: 1 tab(s) orally once a day (15 Oct 2022 14:37)  Trileptal 150 mg oral tablet: 1 tab(s) orally 2 times a day (15 Oct 2022 14:39)  warfarin 5 mg oral tablet: 1 tab(s) orally once a day (2018 11:17)    MEDICATIONS  (STANDING):  acetaminophen     Tablet .. 650 milliGRAM(s) Oral every 6 hours  atorvastatin 10 milliGRAM(s) Oral at bedtime  DULoxetine 30 milliGRAM(s) Oral daily  gabapentin 200 milliGRAM(s) Oral every 8 hours  heparin   Injectable 5000 Unit(s) SubCutaneous every 8 hours  levothyroxine 50 MICROGram(s) Oral daily  lidocaine   4% Patch 1 Patch Transdermal every 24 hours  losartan 100 milliGRAM(s) Oral daily  metoprolol tartrate 25 milliGRAM(s) Oral every 12 hours  pantoprazole    Tablet 40 milliGRAM(s) Oral before breakfast  sodium chloride 1 Gram(s) Oral every 8 hours  sodium chloride 3%. 500 milliLiter(s) (15 mL/Hr) IV Continuous <Continuous>    MEDICATIONS  (PRN):      FAMILY HISTORY:    SOCIAL HISTORY: negative for tobacco, alcohol, or ilicit drug use.    Allergies    Tegretol (Hives; Rash)    Intolerances        GEN: NAD, pleasant, cooperative    NEURO:   MENTAL STATUS: AAOx3  LANG/SPEECH: Fluent, intact naming, repetition & comprehension  CRANIAL NERVES:  II: Pupils equal and reactive, no RAPD, normal visual field on confrontation  III, IV, VI: EOM intact, no gaze preference or deviation, gaze evoked horizontal nys and vertical in looking up  V: normal  VII: flattened L n/l fold  VIII: normal hearing to speech  MOTOR: 5/5 in both upper and lower extremities  REFLEXES: 2/4 throughout, bilateral flexor plantars  SENSORY: Decreased to touch, temperature & pin prick in b/l LE  COORD: Normal finger to nose and heel to shin, no tremor, no dysmetria    LABS:                        10.2   1.86  )-----------( 86       ( 16 Oct 2022 22:26 )             28.9     10-17    130<L>  |  100  |  10  ----------------------------<  95  4.8   |  24  |  0.5<L>    Ca    8.1<L>      17 Oct 2022 04:30  Phos  3.1     10-16  Mg     2.0     10-16      Hemoglobin A1C:   Vitamin B12   PT/INR - ( 16 Oct 2022 22:26 )   PT: 20.00 sec;   INR: 1.73 ratio         PTT - ( 16 Oct 2022 22:26 )  PTT:36.9 sec  CAPILLARY BLOOD GLUCOSE      POCT Blood Glucose.: 94 mg/dL (16 Oct 2022 07:31)      Urinalysis Basic - ( 16 Oct 2022 16:10 )    Color: Yellow / Appearance: Slightly Turbid / S.029 / pH: x  Gluc: x / Ketone: Moderate  / Bili: Negative / Urobili: 3 mg/dL   Blood: x / Protein: 100 mg/dL / Nitrite: Negative   Leuk Esterase: Negative / RBC: 55 /HPF / WBC 6 /HPF   Sq Epi: x / Non Sq Epi: 0 /HPF / Bacteria: Negative            Microbiology:      RADIOLOGY, EKG AND ADDITIONAL TESTS: Reviewed.           NEUROLOGY CONSULT    HPI:  Patient is a 86 y/o female with PMHx of MDS ( on Vidaza- gets every 28 days- just finished course- due in around 3 weeks) HTN, HLD, A-Fib ( On Coumadin 5mg daily), hypothyroid, neuropathy of B/L LE, Trigeminal Neuralgia ( Prior Gamma knife procedure for pain), B/L OA of the knees, prior CCY who presented to [facility] s/p fall at home. Patient was ambulating at home with the walker when she fell onto the right side. She doesn't have the best recollection of the event but denies LOC. She has a home health aide and her grandson was also home at the time. She was able to stand and walk but presented as notes ongoing dizziness and pain over her right chest. Pain over the right side is dull, worse with motion, but not sharp. She notes dizziness notable after fall but not prior. With the dizziness she gets occasional nausea. Prior to fall she denies any change in medications, new medications, recent illness, nor any other features out different from her baseline .  (15 Oct 2022 14:26)    Neurology consulted for medication management for her trigeminalgia, polyneuropathy. Per patient, she had gamma-knife surgery for her L-sided trigeminal neuralgia and she was getting ASM (CBZ which causes rash) then she was taking Gabapentin w/o significant effect, finally she was put on Trileptal 600 mg bid which she was taking for the last years. Her last time trigeminal pain was 3-4 years ago. The day prior of admission she fell at home w/o hitting her head, LOC. On admission she was found having SDH on R quadrigeminal area. Her coumadin was held w starting Heparin. Her Home meds were Trileptal for neuralgia, and Cymbalta for b/l LE polyneuropathy.          MEDICATIONS  Home Medications:  atorvastatin 10 mg oral tablet: 1 tab(s) orally once a day (15 Oct 2022 14:38)  Cymbalta 30 mg oral delayed release capsule: 1 cap(s) orally 2 times a day (15 Oct 2022 14:38)  levothyroxine 50 mcg (0.05 mg) oral tablet: 1 tab(s) orally once a day (2018 11:17)  losartan-hydrochlorothiazide 100 mg-12.5 mg oral tablet: 1 tab(s) orally once a day (15 Oct 2022 14:37)  metoprolol tartrate 25 mg oral tablet: orally once a day (2018 11:17)  Myrbetriq 50 mg oral tablet, extended release: 1 tab(s) orally once a day (15 Oct 2022 14:37)  Trileptal 150 mg oral tablet: 1 tab(s) orally 2 times a day (15 Oct 2022 14:39)  warfarin 5 mg oral tablet: 1 tab(s) orally once a day (2018 11:17)    MEDICATIONS  (STANDING):  acetaminophen     Tablet .. 650 milliGRAM(s) Oral every 6 hours  atorvastatin 10 milliGRAM(s) Oral at bedtime  DULoxetine 30 milliGRAM(s) Oral daily  gabapentin 200 milliGRAM(s) Oral every 8 hours  heparin   Injectable 5000 Unit(s) SubCutaneous every 8 hours  levothyroxine 50 MICROGram(s) Oral daily  lidocaine   4% Patch 1 Patch Transdermal every 24 hours  losartan 100 milliGRAM(s) Oral daily  metoprolol tartrate 25 milliGRAM(s) Oral every 12 hours  pantoprazole    Tablet 40 milliGRAM(s) Oral before breakfast  sodium chloride 1 Gram(s) Oral every 8 hours  sodium chloride 3%. 500 milliLiter(s) (15 mL/Hr) IV Continuous <Continuous>    MEDICATIONS  (PRN):      FAMILY HISTORY:    SOCIAL HISTORY: negative for tobacco, alcohol, or ilicit drug use.    Allergies    Tegretol (Hives; Rash)    Intolerances        GEN: NAD, pleasant, cooperative    NEURO:   MENTAL STATUS: AAOx3  LANG/SPEECH: Fluent, intact naming, repetition & comprehension  CRANIAL NERVES:  II: Pupils equal and reactive, no RAPD, normal visual field on confrontation  III, IV, VI: EOM intact, no gaze preference or deviation, gaze evoked horizontal nys and vertical in looking up  V: normal  VII: flattened L n/l fold  VIII: normal hearing to speech  MOTOR: 5/5 in both upper and lower extremities  REFLEXES: 2/4 throughout, bilateral flexor plantars  SENSORY: Decreased to touch, temperature & pin prick in b/l LE  COORD: Normal finger to nose and heel to shin, no tremor, no dysmetria    LABS:                        10.2   1.86  )-----------( 86       ( 16 Oct 2022 22:26 )             28.9     10-17    130<L>  |  100  |  10  ----------------------------<  95  4.8   |  24  |  0.5<L>    Ca    8.1<L>      17 Oct 2022 04:30  Phos  3.1     10-16  Mg     2.0     10-16      Hemoglobin A1C:   Vitamin B12   PT/INR - ( 16 Oct 2022 22:26 )   PT: 20.00 sec;   INR: 1.73 ratio         PTT - ( 16 Oct 2022 22:26 )  PTT:36.9 sec  CAPILLARY BLOOD GLUCOSE      POCT Blood Glucose.: 94 mg/dL (16 Oct 2022 07:31)      Urinalysis Basic - ( 16 Oct 2022 16:10 )    Color: Yellow / Appearance: Slightly Turbid / S.029 / pH: x  Gluc: x / Ketone: Moderate  / Bili: Negative / Urobili: 3 mg/dL   Blood: x / Protein: 100 mg/dL / Nitrite: Negative   Leuk Esterase: Negative / RBC: 55 /HPF / WBC 6 /HPF   Sq Epi: x / Non Sq Epi: 0 /HPF / Bacteria: Negative

## 2022-10-17 NOTE — OCCUPATIONAL THERAPY INITIAL EVALUATION ADULT - PERTINENT HX OF CURRENT PROBLEM, REHAB EVAL
86 y/o female with PMHx of MDS ( on Vidaza- gets every 28 days- just finished course- due in around 3 weeks) HTN, HLD, A-Fib ( On Coumadin 5mg daily), hypothyroid, neuropathy of B/L LE, Trigeminal Neuralgia ( Prior Gamma knife procedure for pain), B/L OA of the knees, prior CCY who presented to [facility] s/p fall at home. Patient was ambulating at home with the walker when she fell onto the right side. Patient noted on workup to have right sided fracture of the 7-10th right ribs without displacement. On Incentive she can do around 500but without respiratory distress or hemodynamic instability. She was also noted on CT to have a small right sub arachnoid hemorrhage. Admitted to surgery and will have a SICU consult with possible SICU admission due to trauma burden.

## 2022-10-17 NOTE — PROGRESS NOTE ADULT - SUBJECTIVE AND OBJECTIVE BOX
GENERAL SURGERY PROGRESS NOTE    Patient: JANA ESCOBEDO , 87y (39)Female   MRN: [id number]  Location: [address]  Visit: 10-15-22 Inpatient  Date: 10-17-22 @ 08:32    Hospital Day #: 3    Events of past 24 hours:  NAEON    IS 750cc    Pain controlled.  Tolerating diet. Denies nausea/vomiting.  Voiding.    PAST MEDICAL & SURGICAL HISTORY:  Atrial fibrillation    Hypertension    Hypothyroid    Anxiety    H/O colonoscopy  10 yrs ago    History of cholecystectomy      Vitals:   T(F): 97.2 (10-17-22 @ 04:00), Max: 98.2 (10-16-22 @ 15:00)  HR: 77 (10-17-22 @ 07:00)  BP: 165/79 (10-17-22 @ 07:00)  RR: 23 (10-17-22 @ 07:00)  SpO2: 97% (10-17-22 @ 07:00)      Diet, Regular      Fluids:     I & O's:    10-16-22 @ 07:01  -  10-17-22 @ 07:00  --------------------------------------------------------  IN:    Oral Fluid: 50 mL    sodium chloride 3%: 80 mL    sodium chloride 3%: 660 mL  Total IN: 790 mL    OUT:    Voided (mL): 1850 mL  Total OUT: 1850 mL    Total NET: -1060 mL    PHYSICAL EXAM:  General: NAD, calm and cooperative.  Cardiac: RRR.  Respiratory: On RA. Speaks in complete sentences. No accessory muscles of respiration in use. Bilateral chest rise.  Abdomen: Soft, non-distended, non-tender, no rebound, no guarding.   Skin: Warm/dry, normal color, no jaundice.      MEDICATIONS  (STANDING):  acetaminophen     Tablet .. 650 milliGRAM(s) Oral every 6 hours  atorvastatin 10 milliGRAM(s) Oral at bedtime  DULoxetine 30 milliGRAM(s) Oral daily  gabapentin 200 milliGRAM(s) Oral every 8 hours  heparin   Injectable 5000 Unit(s) SubCutaneous every 8 hours  levothyroxine 50 MICROGram(s) Oral daily  lidocaine   4% Patch 1 Patch Transdermal every 24 hours  losartan 100 milliGRAM(s) Oral daily  metoprolol tartrate 25 milliGRAM(s) Oral every 12 hours  OXcarbazepine 150 milliGRAM(s) Oral two times a day  pantoprazole    Tablet 40 milliGRAM(s) Oral before breakfast  sodium chloride 1 Gram(s) Oral every 8 hours  sodium chloride 3%. 500 milliLiter(s) (15 mL/Hr) IV Continuous <Continuous>    MEDICATIONS  (PRN):      DVT PROPHYLAXIS: heparin   Injectable 5000 Unit(s) SubCutaneous every 8 hours    GI PROPHYLAXIS: pantoprazole    Tablet 40 milliGRAM(s) Oral before breakfast    ANTICOAGULATION:   ANTIBIOTICS:        LAB/STUDIES:  Labs:  CAPILLARY BLOOD GLUCOSE                          10.2   1.86  )-----------( 86       ( 16 Oct 2022 22:26 )             28.9         10-17    130<L>  |  100  |  10  ----------------------------<  95  4.8   |  24  |  0.5<L>      Calcium, Total Serum: 8.1 mg/dL (10-17-22 @ 04:30)      LFTs:             6.4  | 0.8  | 43       ------------------[123     ( 15 Oct 2022 10:46 )  3.8  | x    | 17          Lipase:x      Amylase:x         Lactate, Blood: 0.7 mmol/L (10-15-22 @ 10:46)      Coags:     20.00  ----< 1.73    ( 16 Oct 2022 22:26 )     36.9        CARDIAC MARKERS ( 16 Oct 2022 11:29 )  x     / x     / 55 U/L / x     / x      CARDIAC MARKERS ( 16 Oct 2022 09:50 )  x     / x     / 65 U/L / x     / x      CARDIAC MARKERS ( 16 Oct 2022 06:03 )  x     / <0.01 ng/mL / x     / x     / x      CARDIAC MARKERS ( 15 Oct 2022 22:45 )  x     / <0.01 ng/mL / x     / x     / x      CARDIAC MARKERS ( 15 Oct 2022 10:46 )  x     / <0.01 ng/mL / x     / x     / x          Urinalysis Basic - ( 16 Oct 2022 16:10 )    Color: Yellow / Appearance: Slightly Turbid / S.029 / pH: x  Gluc: x / Ketone: Moderate  / Bili: Negative / Urobili: 3 mg/dL   Blood: x / Protein: 100 mg/dL / Nitrite: Negative   Leuk Esterase: Negative / RBC: 55 /HPF / WBC 6 /HPF   Sq Epi: x / Non Sq Epi: 0 /HPF / Bacteria: Negative      IMAGING:    < from: Xray Chest 1 View- PORTABLE-Routine (10.16.22 @ 06:22) >  Impression: No julieta consolidation, effusion or pneumothorax.    < end of copied text >      ACCESS/ DEVICES:  [ ] Peripheral IV

## 2022-10-17 NOTE — PHARMACOTHERAPY INTERVENTION NOTE - COMMENTS
Patient is taking oxcarbazepine 150mg BID for trigeminal neuralgia, which was continued inpatient.   Discussed that oxcarbazepine causes hyponatremia. Patient has been consistently hyponatremic on admission Na 116

## 2022-10-17 NOTE — CONSULT NOTE ADULT - SUBJECTIVE AND OBJECTIVE BOX
Neuroendovascular Consult note:   Consulted for: Acute SAH     HPI:   Patient is an 87 year old female with a past medical history of MDS, HTN, HLD, AFib on Coumadin, hypothyroid, bilateral LE neuropathy, trigeminal neuralgia, bilateral OA of knees, presented s/p fall at home. Patient states she was standing and felt her knees give out, after which she fell to the floor and awoke in a laying position. Patient does not remember hitting her head but states that her son told her she did. Patient lives with a home health aide and family members. Patient denies headache, dizziness, changes in vision, palpitations, nausea/vomiting prior to fall. CTH on arrival reported small acute SAH within the right quadrigeminal plate cistern. CTA initially reporting  previously observed SAH in irvin right quadrigeminal plate likely reflects venous plexus, with addendum now reporting more consistent with SAH. Currently patient states she has a minor headache, without nausea/vomting, no dizziness or lightheadedness, no photo/phonophobia. Without nuchal rigidity on exam. Neuroendovascular team consulted for evaluation of acute SAH for any possible vascular malformations to explain the nature of this bleed.     Atrial Fibrillation   Hypertension  Hypothyroid  Anxiety    Overnight Events: None    Medications   acetaminophen     Tablet .. 650 milliGRAM(s) Oral every 6 hours  atorvastatin 10 milliGRAM(s) Oral at bedtime  DULoxetine 30 milliGRAM(s) Oral daily  gabapentin 200 milliGRAM(s) Oral every 8 hours  heparin   Injectable 5000 Unit(s) SubCutaneous every 8 hours  levothyroxine 50 MICROGram(s) Oral daily  lidocaine   4% Patch 1 Patch Transdermal every 24 hours  losartan 100 milliGRAM(s) Oral daily  metoprolol tartrate 25 milliGRAM(s) Oral every 12 hours  pantoprazole    Tablet 40 milliGRAM(s) Oral before breakfast  sodium chloride 1 Gram(s) Oral every 8 hours  sodium chloride 3%. 500 milliLiter(s) IV Continuous <Continuous>    Vitals   T(F): 97 (10-17-22 @ 12:00), Max: 98.2 (10-16-22 @ 15:00)  HR: 86 (10-17-22 @ 12:00) (76 - 92)  BP: 144/65 (10-17-22 @ 12:00) (99/50 - 168/74)  RR: 35 (10-17-22 @ 12:00) (12 - 35)  SpO2: 99% (10-17-22 @ 12:00) (94% - 100%)    Labs                       10.2   1.86  )-----------( 86       ( 16 Oct 2022 22:26 )             28.9   10-17  130<L>  |  100  |  10  ----------------------------<  95  4.8   |  24  |  0.5<L>  Ca    8.1<L>      17 Oct 2022 04:30  Phos  3.1     10-16  Mg     2.0     10-16  PT/INR - ( 16 Oct 2022 22:26 )   PT: 20.00 sec;   INR: 1.73 ratio    PTT - ( 16 Oct 2022 22:26 )  PTT:36.9 sec    Exam:   General - NAD   Neuro - AAO3, EOMI, PERRL, visual fields intact, slight left facial asymmetry, moving all extremities to antigravity without drift, sensation intact to bilateral UE, decreased sensation bilateral LE below knee, without extinction/ neglect, nonaphasic, nondysarthric, no dysmetria on finger to nose     Radiology:   < from: CT Angio Neck w/ IV Cont (10.15.22 @ 16:39) >  ACC: [id number] EXAM:  CT ANGIO NECK (W)AW IC                        ACC: [id number] EXAM:  CT ANGIO BRAIN (W)AW IC                        *** ADDENDUM***  Upon further review the hyperdensity in the right quadrigeminal plate   favors trace subarachnoid hemorrhage more than venous plexus. Recommend   short-term follow-up CT head.  --- End of Report ---  *** END OF ADDENDUM***  < end of copied text >  < from: CT Angio Neck w/ IV Cont (10.15.22 @ 16:39) >  IMPRESSION:  1.  Previously observed subarachnoid hemorrhage in the right   quadrigeminal plate is likely reflects venous plexus. Confirmation with   MRI can be obtained as indicated.  2.  Mild atherosclerotic stenosis in bilateral cervical ICAs (up to 30%)   and bilateral carotid siphons.  3.  Mild atherosclerotic stenosis in bilateral V4 vertebral arteries.  < end of copied text >  < from: CT Head No Cont (10.15.22 @ 11:37) >  IMPRESSION:  1.  CT head: *Small acute subarachnoid hemorrhage within the right   quadrigeminal plate cistern.  2.  CT cervical spine: No evidence of acute fracture or subluxation.   Multilevel degenerative changes.  < end of copied text >    Assessment:   Patient is an 87 year old female with a past medical history of MDS, HTN, HLD, AFib on Coumadin, hypothyroid, bilateral LE neuropathy, trigeminal neuralgia, bilateral OA of knees, presented s/p fall at home. Patient states she was standing and felt her knees give out, after which she fell to the floor and awoke in a laying position. Patient does not remember hitting her head but states that her son told her she did. Patient lives with a home health aide and family members. Patient denies headache, dizziness, changes in vision, palpitations, nausea/vomiting prior to fall. CTH on arrival reported small acute SAH within the right quadrigeminal plate cistern. CTA initially reporting  previously observed SAH in the right quadrigeminal plate likely reflects venous plexus, with addendum now reporting more consistent with SAH. Neuroendovascular team consulted for evaluation of acute SAH for any possible vascular malformations to explain the nature of this bleed.    Suggestions:  No acute neuroendovascular intervention is warranted at this time.   Please obtain MRI brain w/w/out (SWAN and GRE sequence), MRA and MRV w/w/out for further evaluation of acute SAH and for possible vascular malformations.   Continue all management per primary team.   [id number] neuroendovascular   Neuroendovascular Consult note:   Consulted for: Acute SAH     HPI:   Patient is an 87 year old female with a past medical history of MDS, HTN, HLD, AFib on Coumadin, hypothyroid, bilateral LE neuropathy, trigeminal neuralgia, bilateral OA of knees, presented s/p fall at home. Patient states she was standing and felt her knees give out, after which she fell to the floor and awoke in a laying position. Patient does not remember hitting her head but states that her son told her she did. Patient lives with a home health aide and family members. Patient denies headache, dizziness, changes in vision, palpitations, nausea/vomiting prior to fall. CTH on arrival reported small acute SAH within the right quadrigeminal plate cistern. CTA initially reporting  previously observed SAH in irvin right quadrigeminal plate likely reflects venous plexus, with addendum now reporting more consistent with SAH. Currently patient states she has a minor headache, without nausea/vomting, no dizziness or lightheadedness, no photo/phonophobia. Without nuchal rigidity on exam. Neuroendovascular team consulted for evaluation of acute SAH for any possible vascular malformations to explain the nature of this bleed.     Atrial Fibrillation   Hypertension  Hypothyroid  Anxiety    Overnight Events: None    Medications   acetaminophen     Tablet .. 650 milliGRAM(s) Oral every 6 hours  atorvastatin 10 milliGRAM(s) Oral at bedtime  DULoxetine 30 milliGRAM(s) Oral daily  gabapentin 200 milliGRAM(s) Oral every 8 hours  heparin   Injectable 5000 Unit(s) SubCutaneous every 8 hours  levothyroxine 50 MICROGram(s) Oral daily  lidocaine   4% Patch 1 Patch Transdermal every 24 hours  losartan 100 milliGRAM(s) Oral daily  metoprolol tartrate 25 milliGRAM(s) Oral every 12 hours  pantoprazole    Tablet 40 milliGRAM(s) Oral before breakfast  sodium chloride 1 Gram(s) Oral every 8 hours  sodium chloride 3%. 500 milliLiter(s) IV Continuous <Continuous>    Vitals   T(F): 97 (10-17-22 @ 12:00), Max: 98.2 (10-16-22 @ 15:00)  HR: 86 (10-17-22 @ 12:00) (76 - 92)  BP: 144/65 (10-17-22 @ 12:00) (99/50 - 168/74)  RR: 35 (10-17-22 @ 12:00) (12 - 35)  SpO2: 99% (10-17-22 @ 12:00) (94% - 100%)    Labs                       10.2   1.86  )-----------( 86       ( 16 Oct 2022 22:26 )             28.9   10-17  130<L>  |  100  |  10  ----------------------------<  95  4.8   |  24  |  0.5<L>  Ca    8.1<L>      17 Oct 2022 04:30  Phos  3.1     10-16  Mg     2.0     10-16  PT/INR - ( 16 Oct 2022 22:26 )   PT: 20.00 sec;   INR: 1.73 ratio    PTT - ( 16 Oct 2022 22:26 )  PTT:36.9 sec    Exam:   General - NAD   Neuro - AAO3, EOMI, PERRL, visual fields intact, slight left facial asymmetry, moving all extremities to antigravity without drift, sensation intact to bilateral UE, decreased sensation bilateral LE below knee, without extinction/ neglect, nonaphasic, nondysarthric, no dysmetria on finger to nose     Radiology:   < from: CT Angio Neck w/ IV Cont (10.15.22 @ 16:39) >  ACC: [id number] EXAM:  CT ANGIO NECK (W)AW IC                        ACC: [id number] EXAM:  CT ANGIO BRAIN (W)AW IC                        *** ADDENDUM***  Upon further review the hyperdensity in the right quadrigeminal plate   favors trace subarachnoid hemorrhage more than venous plexus. Recommend   short-term follow-up CT head.  --- End of Report ---  *** END OF ADDENDUM***  < end of copied text >  < from: CT Angio Neck w/ IV Cont (10.15.22 @ 16:39) >  IMPRESSION:  1.  Previously observed subarachnoid hemorrhage in the right   quadrigeminal plate is likely reflects venous plexus. Confirmation with   MRI can be obtained as indicated.  2.  Mild atherosclerotic stenosis in bilateral cervical ICAs (up to 30%)   and bilateral carotid siphons.  3.  Mild atherosclerotic stenosis in bilateral V4 vertebral arteries.  < end of copied text >  < from: CT Head No Cont (10.15.22 @ 11:37) >  IMPRESSION:  1.  CT head: *Small acute subarachnoid hemorrhage within the right   quadrigeminal plate cistern.  2.  CT cervical spine: No evidence of acute fracture or subluxation.   Multilevel degenerative changes.  < end of copied text >    Assessment:   Patient is an 87 year old female with a past medical history of MDS, HTN, HLD, AFib on Coumadin, hypothyroid, bilateral LE neuropathy, trigeminal neuralgia, bilateral OA of knees, presented s/p fall at home. Patient states she was standing and felt her knees give out, after which she fell to the floor and awoke in a laying position. Patient does not remember hitting her head but states that her son told her she did. Patient lives with a home health aide and family members. Patient denies headache, dizziness, changes in vision, palpitations, nausea/vomiting prior to fall. CTH on arrival reported small acute SAH within the right quadrigeminal plate cistern. CTA initially reporting  previously observed SAH in the right quadrigeminal plate likely reflects venous plexus, with addendum now reporting more consistent with SAH. Neuroendovascular team consulted for evaluation of acute SAH for any possible vascular malformations to explain the nature of this bleed.    Suggestions:  No acute neuroendovascular intervention is warranted at this time, and will decide when the following tests are done properly.   Please obtain MRI brain w/w/out (SWAN and GRE sequence), MRA and MRV w/w/out for further evaluation of acute SAH and for possible vascular malformations.   Continue all management per primary team.   [id number] neuroendovascular

## 2022-10-17 NOTE — OCCUPATIONAL THERAPY INITIAL EVALUATION ADULT - GENERAL OBSERVATIONS, REHAB EVAL
Pt encountered semi falcon in bed, + IV, + monitoring, + purwick. Pt agreeable to bedside OT assessment, may be seen as confirmed with RN. Pt returned to bed as found in NAD, + IV, + monitoring, + purwick.

## 2022-10-17 NOTE — OCCUPATIONAL THERAPY INITIAL EVALUATION ADULT - TRANSFER TRAINING, PT EVAL
Pt will perform sit<>stand and bed<>chair transfers with CG A using least restrictive AD by discharge

## 2022-10-17 NOTE — PROGRESS NOTE ADULT - SUBJECTIVE AND OBJECTIVE BOX
Patient is a 87y old  Female who presents with a chief complaint of S/P Fall, subarachnoid hemorrhage, multiple rib fracture       HPI:  Patient is a 88 y/o female with PMHx of MDS ( on Vidaza- gets every 28 days- just finished course- due in around 3 weeks) HTN, HLD, A-Fib ( On Coumadin 5mg daily), hypothyroid, neuropathy of B/L LE, Trigeminal Neuralgia ( Prior Gamma knife procedure for pain), B/L OA of the knees, prior CCY who presented to [facility] s/p fall at home. Patient was ambulating at home with the walker when she fell onto the right side. She doesn't have the best recollection of the event but denies LOC. She has a home health aide and her grandson was also home at the time. She was able to stand and walk but presented as notes ongoing dizziness and pain over her right chest. Pain over the right side is dull, worse with motion, but not sharp. She notes dizziness notable after fall but not prior. With the dizziness she gets occasional nausea. Prior to fall she denies any change in medications, new medications, recent illness, nor any other features out different from her baseline .   Trauma workup significant for small acute SAH within R quadrigeminal plate cistern and R minimally displaced acute 7-10 rib fractures.        PHYSICAL EXAM    Vital Signs Last 24 Hrs  T(C): 36.6 (17 Oct 2022 15:36), Max: 36.6 (17 Oct 2022 15:36)  T(F): 97.9 (17 Oct 2022 15:36), Max: 97.9 (17 Oct 2022 15:36)  HR: 82 (17 Oct 2022 14:00) (76 - 91)  BP: 129/97 (17 Oct 2022 14:00) (99/50 - 165/79)  BP(mean): 109 (17 Oct 2022 14:00) (68 - 113)  RR: 35 (17 Oct 2022 14:00) (12 - 35)  SpO2: 100% (17 Oct 2022 14:00) (94% - 100%)    Parameters below as of 17 Oct 2022 08:00  Patient On (Oxygen Delivery Method): room air        Constitutional - NAD  Chest - CTA  Cardiovascular - S1S2+  Abdomen -  Soft  Extremities -  No calf tenderness   Function : bed mobility and transfer min A                 ambulate 40'RW min A    acetaminophen     Tablet .. 650 milliGRAM(s) Oral every 6 hours  ALPRAZolam 0.25 milliGRAM(s) Oral once PRN  atorvastatin 10 milliGRAM(s) Oral at bedtime  DULoxetine 30 milliGRAM(s) Oral daily  heparin   Injectable 5000 Unit(s) SubCutaneous every 8 hours  levothyroxine 50 MICROGram(s) Oral daily  lidocaine   4% Patch 1 Patch Transdermal every 24 hours  losartan 100 milliGRAM(s) Oral daily  metoprolol tartrate 25 milliGRAM(s) Oral every 12 hours  pantoprazole    Tablet 40 milliGRAM(s) Oral before breakfast  sodium chloride 1 Gram(s) Oral every 8 hours      RECENT LABS/IMAGING                        10.2   1.86  )-----------( 86       ( 16 Oct 2022 22:26 )             28.9     10-    135  |  103  |  10  ----------------------------<  97  5.0   |  21  |  0.5<L>    Ca    8.4      17 Oct 2022 11:38  Phos  3.1     10-16  Mg     2.0     10-16      PT/INR - ( 16 Oct 2022 22:26 )   PT: 20.00 sec;   INR: 1.73 ratio         PTT - ( 16 Oct 2022 22:26 )  PTT:36.9 sec  Urinalysis Basic - ( 16 Oct 2022 16:10 )    Color: Yellow / Appearance: Slightly Turbid / S.029 / pH: x  Gluc: x / Ketone: Moderate  / Bili: Negative / Urobili: 3 mg/dL   Blood: x / Protein: 100 mg/dL / Nitrite: Negative   Leuk Esterase: Negative / RBC: 55 /HPF / WBC 6 /HPF   Sq Epi: x / Non Sq Epi: 0 /HPF / Bacteria: Negative

## 2022-10-17 NOTE — PROGRESS NOTE ADULT - SUBJECTIVE AND OBJECTIVE BOX
JANA ESCOBEDO  [id number]  87y Female    Indication for ICU admission:    Admit Date:10-15-22  ICU Date: 10-15-22  OR Date:     Tegretol (Hives; Rash)    PAST MEDICAL & SURGICAL HISTORY:  Atrial fibrillation  Hypertension  Hypothyroid  Anxiety  H/O colonoscopy - 10 yrs ago  History of cholecystectomy      Home Medications:  atorvastatin 10 mg oral tablet: 1 tab(s) orally once a day (15 Oct 2022 14:38)  Cymbalta 30 mg oral delayed release capsule: 1 cap(s) orally 2 times a day (15 Oct 2022 14:38)  levothyroxine 50 mcg (0.05 mg) oral tablet: 1 tab(s) orally once a day (20 Nov 2018 11:17)  losartan-hydrochlorothiazide 100 mg-12.5 mg oral tablet: 1 tab(s) orally once a day (15 Oct 2022 14:37)  metoprolol tartrate 25 mg oral tablet: orally once a day (20 Nov 2018 11:17)  Myrbetriq 50 mg oral tablet, extended release: 1 tab(s) orally once a day (15 Oct 2022 14:37)  Trileptal 150 mg oral tablet: 1 tab(s) orally 2 times a day (15 Oct 2022 14:39)  warfarin 5 mg oral tablet: 1 tab(s) orally once a day (20 Nov 2018 11:17)        24HRS EVENT:  10/16  Night  serum osm 278    Day:  -added Lidoderm patch  -f/up NSGY- ok with diet and HSQ, Q4h neoro checks, Neuro IR consult, can resume coumadin in 1 week  -CTA: possible venous plexus  -Heme/Onc: patient close to baseline, no intervention  -INR 1.48  -PT/OT  -U/A + blood, + bacteria  -F/U CK--> 65--> 55  - Rpt Na+--> 125, decrease 3% to 30/hr--> 123--> 123  - F/U K--> 3.2--> repleted-->3.7--> 3.9  -F/U Uosm-384, Nkechi--> 100   Serum osm--> 253  - CTA Head/Neck addendum- hyperdensity in quadrigeminal plate favors small SAH more than venous plexus  -NSGY aware, no change in further mgmt, no further imaging needed          DVT PTX: hsq    GI PTX:pantoprazole    Tablet 40 milliGRAM(s) Oral before breakfast      ***Tubes/Lines/Drains  ***  Peripheral IV      REVIEW OF SYSTEMS    [x] A ten-point review of systems was otherwise negative except as noted.  [ ] Due to altered mental status/intubation, subjective information were not able to be obtained from the patient. History was obtained, to the extent possible, from review of the chart and collateral sources of information.       JANA ESCOBEDO  [id number]  87y Female    Indication for ICU admission:    Admit Date:10-15-22  ICU Date: 10-15-22  OR Date:     Tegretol (Hives; Rash)    PAST MEDICAL & SURGICAL HISTORY:  Atrial fibrillation  Hypertension  Hypothyroid  Anxiety  H/O colonoscopy - 10 yrs ago  History of cholecystectomy      Home Medications:  atorvastatin 10 mg oral tablet: 1 tab(s) orally once a day (15 Oct 2022 14:38)  Cymbalta 30 mg oral delayed release capsule: 1 cap(s) orally 2 times a day (15 Oct 2022 14:38)  levothyroxine 50 mcg (0.05 mg) oral tablet: 1 tab(s) orally once a day (20 Nov 2018 11:17)  losartan-hydrochlorothiazide 100 mg-12.5 mg oral tablet: 1 tab(s) orally once a day (15 Oct 2022 14:37)  metoprolol tartrate 25 mg oral tablet: orally once a day (20 Nov 2018 11:17)  Myrbetriq 50 mg oral tablet, extended release: 1 tab(s) orally once a day (15 Oct 2022 14:37)  Trileptal 150 mg oral tablet: 1 tab(s) orally 2 times a day (15 Oct 2022 14:39)  warfarin 5 mg oral tablet: 1 tab(s) orally once a day (20 Nov 2018 11:17)        24HRS EVENT:  10/16  Night  serum osm 278    Day:  -added Lidoderm patch  -f/up NSGY- ok with diet and HSQ, Q4h neoro checks, Neuro IR consult, can resume coumadin in 1 week  -CTA: possible venous plexus  -Heme/Onc: patient close to baseline, no intervention  -INR 1.48  -PT/OT  -U/A + blood, + bacteria  -F/U CK--> 65--> 55  - Rpt Na+--> 125, decrease 3% to 30/hr--> 123--> 123  - F/U K--> 3.2--> repleted-->3.7--> 3.9  -F/U Uosm-384, Nkechi--> 100   Serum osm--> 253  - CTA Head/Neck addendum- hyperdensity in quadrigeminal plate favors small SAH more than venous plexus  -NSGY aware, no change in further mgmt, no further imaging needed          DVT PTX: hsq    GI PTX:pantoprazole    Tablet 40 milliGRAM(s) Oral before breakfast      ***Tubes/Lines/Drains  ***  Peripheral IV      REVIEW OF SYSTEMS    [x] A ten-point review of systems was otherwise negative except as noted.  [ ] Due to altered mental status/intubation, subjective information were not able to be obtained from the patient. History was obtained, to the extent possible, from review of the chart and collateral sources of information.      Daily Height in cm: 160.02 (16 Oct 2022 10:18)    Daily     Diet, Regular (10-16-22 @ 10:07)      CURRENT MEDS:  Neurologic Medications  acetaminophen     Tablet .. 650 milliGRAM(s) Oral every 6 hours  DULoxetine 30 milliGRAM(s) Oral daily  gabapentin 200 milliGRAM(s) Oral every 8 hours  OXcarbazepine 150 milliGRAM(s) Oral two times a day    Respiratory Medications    Cardiovascular Medications  losartan 100 milliGRAM(s) Oral daily  metoprolol tartrate 25 milliGRAM(s) Oral every 12 hours    Gastrointestinal Medications  pantoprazole    Tablet 40 milliGRAM(s) Oral before breakfast  sodium chloride 1 Gram(s) Oral every 8 hours  sodium chloride 3%. 500 milliLiter(s) IV Continuous <Continuous>    Genitourinary Medications    Hematologic/Oncologic Medications  heparin   Injectable 5000 Unit(s) SubCutaneous every 8 hours    Antimicrobial/Immunologic Medications    Endocrine/Metabolic Medications  atorvastatin 10 milliGRAM(s) Oral at bedtime  levothyroxine 50 MICROGram(s) Oral daily    Topical/Other Medications  lidocaine   4% Patch 1 Patch Transdermal every 24 hours      ICU Vital Signs Last 24 Hrs  T(C): 36.2 (17 Oct 2022 04:00), Max: 36.8 (16 Oct 2022 15:00)  T(F): 97.2 (17 Oct 2022 04:00), Max: 98.2 (16 Oct 2022 15:00)  HR: 77 (17 Oct 2022 07:00) (74 - 92)  BP: 165/79 (17 Oct 2022 07:00) (99/50 - 197/78)  BP(mean): 113 (17 Oct 2022 07:00) (68 - 119)  ABP: --  ABP(mean): --  RR: 23 (17 Oct 2022 07:00) (12 - 23)  SpO2: 97% (17 Oct 2022 07:00) (94% - 100%)    O2 Parameters below as of 17 Oct 2022 04:00  Patient On (Oxygen Delivery Method): room air            Adult Advanced Hemodynamics Last 24 Hrs  CVP(mm Hg): --  CVP(cm H2O): --  CO: --  CI: --  PA: --  PA(mean): --  PCWP: --  SVR: --  SVRI: --  PVR: --  PVRI: --          I&O's Summary    16 Oct 2022 07:01  -  17 Oct 2022 07:00  --------------------------------------------------------  IN: 790 mL / OUT: 1850 mL / NET: -1060 mL      I&O's Detail    16 Oct 2022 07:01  -  17 Oct 2022 07:00  --------------------------------------------------------  IN:    Oral Fluid: 50 mL    sodium chloride 3%: 80 mL    sodium chloride 3%: 660 mL  Total IN: 790 mL    OUT:    Voided (mL): 1850 mL  Total OUT: 1850 mL    Total NET: -1060 mL          PHYSICAL EXAM:     Neuro- alert and oriented x 3, pupils 3 mm and reactive, GCS 15, no focal deficits  Resp- lungs are clear, no crackles, no wheezing, right sided reproducible chest pain  Cardiac- S1, S2, irregular rhythm  Abd- soft, NT, ND, + bowel sounds  MSK- ROM x 4, strength 5/5 throughout  - no cantrell

## 2022-10-17 NOTE — OCCUPATIONAL THERAPY INITIAL EVALUATION ADULT - SPECIFY REASON(S)
Chart reviewed. Attempted OT assessment, pt encountered sleeping soundly, unarousable at this time. OT to f/u at later time in day. RN aware

## 2022-10-17 NOTE — PROGRESS NOTE ADULT - ASSESSMENT
Assessment:  87F MDS, HTN, AFIB on Coumadin, Trigeminal Neuralgia, Hypothyroidism, and OA s/p fall ?HT, ?LOC, +AC w/ trauma workup significant for small acute SAH within R quadrigeminal plate cistern and R minimally displaced acute 7-10 rib fractures.    Plan:  - Care per SICU  - Tolerating diet  - Hyponatremia and Hypokalemia improving  - F/u Dr. Chavez - pt's Heme/Onc doc  - PT/Phys rec 4A  - F/u CM    Trauma/ACS [id number]

## 2022-10-17 NOTE — PROGRESS NOTE ADULT - ASSESSMENT
Imp: Rehab of SAH / right 7-10 rib fx, s/p fall  Plan: continue bedside therapy as tolerated           screened for 4a, will follow

## 2022-10-17 NOTE — OCCUPATIONAL THERAPY INITIAL EVALUATION ADULT - ADDITIONAL COMMENTS
Pt lives in private house with adult daughter and 2 grandchildren, No stairs, + stall shower with seat and multiple grab bars throughout bathroom, + high toilet, + HHA 8hrs x5 days

## 2022-10-18 LAB
ANION GAP SERPL CALC-SCNC: 9 MMOL/L — SIGNIFICANT CHANGE UP (ref 7–14)
ANION GAP SERPL CALC-SCNC: 9 MMOL/L — SIGNIFICANT CHANGE UP (ref 7–14)
BUN SERPL-MCNC: 8 MG/DL — LOW (ref 10–20)
BUN SERPL-MCNC: 9 MG/DL — LOW (ref 10–20)
CALCIUM SERPL-MCNC: 8 MG/DL — LOW (ref 8.4–10.5)
CALCIUM SERPL-MCNC: 8.7 MG/DL — SIGNIFICANT CHANGE UP (ref 8.4–10.5)
CHLORIDE SERPL-SCNC: 96 MMOL/L — LOW (ref 98–110)
CHLORIDE SERPL-SCNC: 99 MMOL/L — SIGNIFICANT CHANGE UP (ref 98–110)
CO2 SERPL-SCNC: 24 MMOL/L — SIGNIFICANT CHANGE UP (ref 17–32)
CO2 SERPL-SCNC: 26 MMOL/L — SIGNIFICANT CHANGE UP (ref 17–32)
CREAT SERPL-MCNC: <0.5 MG/DL — LOW (ref 0.7–1.5)
CREAT SERPL-MCNC: <0.5 MG/DL — LOW (ref 0.7–1.5)
EGFR: 97 ML/MIN/1.73M2 — SIGNIFICANT CHANGE UP
EGFR: 97 ML/MIN/1.73M2 — SIGNIFICANT CHANGE UP
GLUCOSE SERPL-MCNC: 102 MG/DL — HIGH (ref 70–99)
GLUCOSE SERPL-MCNC: 104 MG/DL — HIGH (ref 70–99)
OSMOLALITY SERPL: 278 MOS/KG — LOW (ref 280–301)
POTASSIUM SERPL-MCNC: 4 MMOL/L — SIGNIFICANT CHANGE UP (ref 3.5–5)
POTASSIUM SERPL-MCNC: 4.1 MMOL/L — SIGNIFICANT CHANGE UP (ref 3.5–5)
POTASSIUM SERPL-SCNC: 4 MMOL/L — SIGNIFICANT CHANGE UP (ref 3.5–5)
POTASSIUM SERPL-SCNC: 4.1 MMOL/L — SIGNIFICANT CHANGE UP (ref 3.5–5)
SODIUM SERPL-SCNC: 131 MMOL/L — LOW (ref 135–146)
SODIUM SERPL-SCNC: 132 MMOL/L — LOW (ref 135–146)

## 2022-10-18 PROCEDURE — 99233 SBSQ HOSP IP/OBS HIGH 50: CPT

## 2022-10-18 PROCEDURE — 70545 MR ANGIOGRAPHY HEAD W/DYE: CPT | Mod: 26,59

## 2022-10-18 PROCEDURE — 70553 MRI BRAIN STEM W/O & W/DYE: CPT | Mod: 26

## 2022-10-18 RX ORDER — METOPROLOL TARTRATE 50 MG
25 TABLET ORAL EVERY 12 HOURS
Refills: 0 | Status: DISCONTINUED | OUTPATIENT
Start: 2022-10-18 | End: 2022-10-20

## 2022-10-18 RX ORDER — HYDROCHLOROTHIAZIDE 25 MG
12.5 TABLET ORAL DAILY
Refills: 0 | Status: DISCONTINUED | OUTPATIENT
Start: 2022-10-18 | End: 2022-10-19

## 2022-10-18 RX ORDER — LOSARTAN POTASSIUM 100 MG/1
100 TABLET, FILM COATED ORAL DAILY
Refills: 0 | Status: DISCONTINUED | OUTPATIENT
Start: 2022-10-18 | End: 2022-10-20

## 2022-10-18 RX ORDER — SODIUM CHLORIDE 9 MG/ML
2 INJECTION INTRAMUSCULAR; INTRAVENOUS; SUBCUTANEOUS EVERY 8 HOURS
Refills: 0 | Status: DISCONTINUED | OUTPATIENT
Start: 2022-10-18 | End: 2022-10-19

## 2022-10-18 RX ORDER — LABETALOL HCL 100 MG
10 TABLET ORAL ONCE
Refills: 0 | Status: COMPLETED | OUTPATIENT
Start: 2022-10-18 | End: 2022-10-18

## 2022-10-18 RX ADMIN — Medication 25 MILLIGRAM(S): at 05:01

## 2022-10-18 RX ADMIN — PANTOPRAZOLE SODIUM 40 MILLIGRAM(S): 20 TABLET, DELAYED RELEASE ORAL at 06:23

## 2022-10-18 RX ADMIN — Medication 650 MILLIGRAM(S): at 00:03

## 2022-10-18 RX ADMIN — SODIUM CHLORIDE 2 GRAM(S): 9 INJECTION INTRAMUSCULAR; INTRAVENOUS; SUBCUTANEOUS at 22:45

## 2022-10-18 RX ADMIN — Medication 25 GRAM(S): at 03:08

## 2022-10-18 RX ADMIN — HEPARIN SODIUM 5000 UNIT(S): 5000 INJECTION INTRAVENOUS; SUBCUTANEOUS at 05:02

## 2022-10-18 RX ADMIN — Medication 10 MILLIGRAM(S): at 03:33

## 2022-10-18 RX ADMIN — DULOXETINE HYDROCHLORIDE 30 MILLIGRAM(S): 30 CAPSULE, DELAYED RELEASE ORAL at 11:17

## 2022-10-18 RX ADMIN — HEPARIN SODIUM 5000 UNIT(S): 5000 INJECTION INTRAVENOUS; SUBCUTANEOUS at 22:45

## 2022-10-18 RX ADMIN — Medication 50 MILLILITER(S): at 01:36

## 2022-10-18 RX ADMIN — Medication 650 MILLIGRAM(S): at 11:17

## 2022-10-18 RX ADMIN — Medication 25 MILLIGRAM(S): at 20:16

## 2022-10-18 RX ADMIN — SODIUM CHLORIDE 1 GRAM(S): 9 INJECTION INTRAMUSCULAR; INTRAVENOUS; SUBCUTANEOUS at 05:02

## 2022-10-18 RX ADMIN — INSULIN HUMAN 10 UNIT(S): 100 INJECTION, SOLUTION SUBCUTANEOUS at 01:36

## 2022-10-18 RX ADMIN — Medication 12.5 MILLIGRAM(S): at 13:35

## 2022-10-18 RX ADMIN — Medication 200 GRAM(S): at 03:20

## 2022-10-18 RX ADMIN — HEPARIN SODIUM 5000 UNIT(S): 5000 INJECTION INTRAVENOUS; SUBCUTANEOUS at 13:35

## 2022-10-18 RX ADMIN — Medication 85 MILLIMOLE(S): at 01:36

## 2022-10-18 RX ADMIN — Medication 0.25 MILLIGRAM(S): at 17:24

## 2022-10-18 RX ADMIN — Medication 650 MILLIGRAM(S): at 20:16

## 2022-10-18 RX ADMIN — ATORVASTATIN CALCIUM 10 MILLIGRAM(S): 80 TABLET, FILM COATED ORAL at 22:45

## 2022-10-18 RX ADMIN — SODIUM CHLORIDE 2 GRAM(S): 9 INJECTION INTRAMUSCULAR; INTRAVENOUS; SUBCUTANEOUS at 13:35

## 2022-10-18 RX ADMIN — Medication 50 MICROGRAM(S): at 05:02

## 2022-10-18 RX ADMIN — Medication 650 MILLIGRAM(S): at 05:02

## 2022-10-18 RX ADMIN — LIDOCAINE 1 PATCH: 4 CREAM TOPICAL at 08:24

## 2022-10-18 NOTE — PROGRESS NOTE ADULT - ASSESSMENT
87y Female with PMH of HTN. HLD, Hypothyroid disease, Afib (on warfarin- last dose last night), mild myelodysplastic syndrome (on Vizada Q 28 days- last treatment 1 week ago), Trigeminal Neuralgia ( on Trileptal, prior gamma knife procedure), B/L knee Osteoarthritis- walker device dependent was brought in by EMS after an unwitnessed fall at home. Imaging shows small acute SAH within the right quadrigeminal plate cistern and right 7-10 rib fxs.     NEURO:   # Acute SAH  - Rpt Head CTA Head and Neck: hyperdensity in quadrigeminal plate favors small SAH      more than venous plexus      mild atheroslerotic stenosis in B/L cervical ICA's and B/L carotid siphons- mild          atherosclerotic stenosis in B/L V4 vertebral arteries.  - NSGY- no interventions, no further imaging needed unless exam changes        Obtain Neuro IR consult- pending,         Q4h neuro checks        can start HSQ, can resume Coumadin in 1 week (10/22/22)        10/17 Rec MRI w and w/o, MRA, MRV   - 10/17 hold home Trileptal (can cause hyponatremia, pt on for trigeminal neuralgia), no need for seizure ppx as per Neuro    # PMH Trigeminal Neuralgia  - 10/17 holding Trileptal 2/2 hyponatremia    # PM Neuropathy  - con't Cymbalta    #Pain-controlled     with Tylenol and Lidocaine patch  - 10/17 d/c gabapentin as per neuro    RESP:   # right7-10 rib fxs   - 10/17 d/c daily CXR  - incentive spirometry- pulling 750   - pain control    CARDS:   # A-fib, rate controlled  - con't Metoprolol 25 Q12h  -  home coumadin- held- can resume in 1 week as per NSGY, 10/22  - INR 2.1 and 2.4- Coumadin reversed with Kcentra due to intra-cranial bleed  - INR trend: 2.1--> 2.4-->1.48 > 1.73  - Daily EKG    # PMH of HTN  - con't home regimen, Metoprolol and Losartan/HCTZ  - HCTZ held due to hyponatremia    # PMH HLD  - con't Lipitor    Trop negative x 3      GI/NUTR:   No acute issues  - Regular Diet  - 10/17 Free water restriction 1L    /RENAL:   # Hyponatremia- improving  - NaCl PO 2G q8    HEME/ONC:   # PMH of Myelodysplastic syndrome  - receives Vizada Q28 days, last treatment 1 week ago  [ ] Consulted Dr Chavez - pending recommendations      # Leukopenia- WBC 2.0 , improving - 1.96  - likely secondary to MDS treatment  - Hematology consult appreciated- nothing to do  - con't to trend      Labs: Hb/Hct:  9.8/28          Plts:  86              INR:  2.1--> 2.4--> Kcentra--> 1.48 > 1.73      ID:   WBC- 1.83  --->>,  2.02  --->> 1.86 -->> 1.96  afebrile  -No signs of infection  -UA- with + bacteria, no leuk, no nitrites- Ceftriaxone x 1 given in the ED  -Repeat UA +lg blood     ENDO:  # PMH Hypothyroidism  rcon't Synthroid 50 mcg QD    # Glucose monitoring  - Q6h FS     DVT ppx:   HSQ Q8h  SCDs to lower ext      GI ppx:  Protonix    LINES/DRAINS: PIV,     DISPO: SURG    Pending:  [ ] MRI/MRA/MRV - f/u with neuroendovascular  [ ] Heme Onc Dr Chavez for MDS mgmt   [ ] f/u lytes    Patient s/o to Gen Surg Blue Team

## 2022-10-18 NOTE — PROGRESS NOTE ADULT - SUBJECTIVE AND OBJECTIVE BOX
JANA ESCOBEDO  [id number]  87y Female    Indication for ICU admission:    Admit Date:10-15-22  ICU Date: 10-15-22  OR Date:     Tegretol (Hives; Rash)    PAST MEDICAL & SURGICAL HISTORY:  Atrial fibrillation  Hypertension  Hypothyroid  Anxiety  H/O colonoscopy - 10 yrs ago  History of cholecystectomy      Home Medications:  atorvastatin 10 mg oral tablet: 1 tab(s) orally once a day (15 Oct 2022 14:38)  Cymbalta 30 mg oral delayed release capsule: 1 cap(s) orally 2 times a day (15 Oct 2022 14:38)  levothyroxine 50 mcg (0.05 mg) oral tablet: 1 tab(s) orally once a day (20 Nov 2018 11:17)  losartan-hydrochlorothiazide 100 mg-12.5 mg oral tablet: 1 tab(s) orally once a day (15 Oct 2022 14:37)  metoprolol tartrate 25 mg oral tablet: orally once a day (20 Nov 2018 11:17)  Myrbetriq 50 mg oral tablet, extended release: 1 tab(s) orally once a day (15 Oct 2022 14:37)  Trileptal 150 mg oral tablet: 1 tab(s) orally 2 times a day (15 Oct 2022 14:39)  warfarin 5 mg oral tablet: 1 tab(s) orally once a day (20 Nov 2018 11:17)      24HRS EVENT:  Night  -na 135 --> 135  -hyperkalemia treated, mag and phos repleted      Day  -decrease 3% from 30 cc--> 15 cc, f/u 11 am BMP imprved Na 135 --> d/c d/c 3% saline, f/u 2000 BMP  -free water restriction 1L  -f/u NSGY- INR 1.73 --> as long as it is below 2, no intervention  -Cont holding HCTZ   -Hold Trileptal (can cause hyponatremia), neuro consult - rec d/c trileptal and gabapentin, continue cymbalta, Rec MRI brain w/o   -f/u neuroendovascular c/s for possible venous plexus on CTA - recommending MRI w w/o, MRA, MRV of head  -d/c daily CXRs     DVT PTX: hsq    GI PTX:pantoprazole    Tablet 40 milliGRAM(s) Oral before breakfast      ***Tubes/Lines/Drains  ***  Peripheral IV      REVIEW OF SYSTEMS    [x] A ten-point review of systems was otherwise negative except as noted.  [ ] Due to altered mental status/intubation, subjective information were not able to be obtained from the patient. History was obtained, to the extent possible, from review of the chart and collateral sources of information.               JANA ESCOBEDO  [id number]  87y Female    Indication for ICU admission:    Admit Date:10-15-22  ICU Date: 10-15-22  OR Date:     Tegretol (Hives; Rash)    PAST MEDICAL & SURGICAL HISTORY:  Atrial fibrillation  Hypertension  Hypothyroid  Anxiety  H/O colonoscopy - 10 yrs ago  History of cholecystectomy      Home Medications:  atorvastatin 10 mg oral tablet: 1 tab(s) orally once a day (15 Oct 2022 14:38)  Cymbalta 30 mg oral delayed release capsule: 1 cap(s) orally 2 times a day (15 Oct 2022 14:38)  levothyroxine 50 mcg (0.05 mg) oral tablet: 1 tab(s) orally once a day (2018 11:17)  losartan-hydrochlorothiazide 100 mg-12.5 mg oral tablet: 1 tab(s) orally once a day (15 Oct 2022 14:37)  metoprolol tartrate 25 mg oral tablet: orally once a day (2018 11:17)  Myrbetriq 50 mg oral tablet, extended release: 1 tab(s) orally once a day (15 Oct 2022 14:37)  Trileptal 150 mg oral tablet: 1 tab(s) orally 2 times a day (15 Oct 2022 14:39)  warfarin 5 mg oral tablet: 1 tab(s) orally once a day (2018 11:17)      24HRS EVENT:  Night  -na 135 --> 135  -hyperkalemia treated, mag and phos repleted      Day  -decrease 3% from 30 cc--> 15 cc, f/u 11 am BMP imprved Na 135 --> d/c d/c 3% saline, f/u  BMP  -free water restriction 1L  -f/u NSGY- INR 1.73 --> as long as it is below 2, no intervention  -Cont holding HCTZ   -Hold Trileptal (can cause hyponatremia), neuro consult - rec d/c trileptal and gabapentin, continue cymbalta, Rec MRI brain w/o   -f/u neuroendovascular c/s for possible venous plexus on CTA - recommending MRI w w/o, MRA, MRV of head  -d/c daily CXRs     DVT PTX: hsq    GI PTX:pantoprazole    Tablet 40 milliGRAM(s) Oral before breakfast      ***Tubes/Lines/Drains  ***  Peripheral IV      REVIEW OF SYSTEMS    [x] A ten-point review of systems was otherwise negative except as noted.  [ ] Due to altered mental status/intubation, subjective information were not able to be obtained from the patient. History was obtained, to the extent possible, from review of the chart and collateral sources of information.    Daily     Daily Weight in k.4 (18 Oct 2022 06:00)  Diet, Regular (10-16-22 @ 10:07)    CURRENT MEDS:  Neurologic Medications  acetaminophen     Tablet .. 650 milliGRAM(s) Oral every 6 hours  ALPRAZolam 0.25 milliGRAM(s) Oral once PRN ONLY FOR USE PRIOR TO MRI IF FEELING ANXIOUS  DULoxetine 30 milliGRAM(s) Oral daily    Respiratory Medications    Cardiovascular Medications  losartan 100 milliGRAM(s) Oral daily  metoprolol tartrate 25 milliGRAM(s) Oral every 12 hours    Gastrointestinal Medications  pantoprazole    Tablet 40 milliGRAM(s) Oral before breakfast  sodium chloride 1 Gram(s) Oral every 8 hours    Genitourinary Medications    Hematologic/Oncologic Medications  heparin   Injectable 5000 Unit(s) SubCutaneous every 8 hours    Antimicrobial/Immunologic Medications    Endocrine/Metabolic Medications  atorvastatin 10 milliGRAM(s) Oral at bedtime  levothyroxine 50 MICROGram(s) Oral daily    Topical/Other Medications  lidocaine   4% Patch 1 Patch Transdermal every 24 hours    ICU Vital Signs Last 24 Hrs  T(C): 36.1 (18 Oct 2022 08:00), Max: 36.6 (17 Oct 2022 15:36)  T(F): 97 (18 Oct 2022 08:00), Max: 97.9 (17 Oct 2022 15:36)  HR: 91 (18 Oct 2022 08:00) (76 - 93)  BP: 171/74 (18 Oct 2022 08:00) (114/59 - 197/83)  BP(mean): 106 (18 Oct 2022 08:00) (82 - 131)  ABP: --  ABP(mean): --  RR: 22 (18 Oct 2022 08:00) (18 - 35)  SpO2: 100% (18 Oct 2022 08:00) (97% - 100%)    O2 Parameters below as of 18 Oct 2022 08:00  Patient On (Oxygen Delivery Method): room air              I&O's Summary    17 Oct 2022 07:01  -  18 Oct 2022 07:00  --------------------------------------------------------  IN: 1305 mL / OUT: 1250 mL / NET: 55 mL    18 Oct 2022 07:01  -  18 Oct 2022 08:13  --------------------------------------------------------  IN: 300 mL / OUT: 200 mL / NET: 100 mL      I&O's Detail    17 Oct 2022 07:01  -  18 Oct 2022 07:00  --------------------------------------------------------  IN:    IV PiggyBack: 700 mL    Oral Fluid: 500 mL    sodium chloride 3%: 30 mL    sodium chloride 3%: 75 mL  Total IN: 1305 mL    OUT:    Voided (mL): 1250 mL  Total OUT: 1250 mL    Total NET: 55 mL      18 Oct 2022 07:01  -  18 Oct 2022 08:13  --------------------------------------------------------  IN:    Oral Fluid: 300 mL  Total IN: 300 mL    OUT:    Voided (mL): 200 mL  Total OUT: 200 mL    Total NET: 100 mL      PHYSICAL EXAM:     a&ox3  follows commands, SISSY  cn2-12 grossly intact  b/l UE / LE motor strength and sensation intact  no acute distress  equal chest rise b/l  abdomen soft  extremities soft

## 2022-10-18 NOTE — PROGRESS NOTE ADULT - SUBJECTIVE AND OBJECTIVE BOX
GENERAL SURGERY PROGRESS NOTE    Patient: JANA ESCOBEDO , 87y ([birth year])Female   MRN: [id number]  Location: [address]   Visit: 10-15-22 Inpatient  Date: 10-18-22 @ 15:52    Hospital Day #: 4    Procedure/Dx/Injuries: S/P fall    Events of past 24 hours: Patient was downgraded. She was restarted on HCTZ 12.5 QD and salt tabs 2g q8. No acute events overnight. MRI pending.    PAST MEDICAL & SURGICAL HISTORY:  Atrial fibrillation      Hypertension      Hypothyroid      Anxiety      H/O colonoscopy  10 yrs ago      History of cholecystectomy          Vitals:   T(F): 97.2 (10-18-22 @ 12:00), Max: 97.6 (10-17-22 @ 20:00)  HR: 96 (10-18-22 @ 12:00)  BP: 149/91 (10-18-22 @ 12:00)  RR: 21 (10-18-22 @ 12:00)  SpO2: 100% (10-18-22 @ 12:00)      Diet, Regular      Fluids: sodium chloride:   2 Gram(s), Oral, every 8 hours      I & O's:    10-17-22 @ 07:01  -  10-18-22 @ 07:00  --------------------------------------------------------  IN:    IV PiggyBack: 700 mL    Oral Fluid: 500 mL    sodium chloride 3%: 30 mL    sodium chloride 3%: 75 mL  Total IN: 1305 mL    OUT:    Voided (mL): 1250 mL  Total OUT: 1250 mL    Total NET: 55 mL    PHYSICAL EXAM:  General: NAD, calm and cooperative.  Cardiac: S1, S2  Respiratory: Normal respiratory effort on RA  Abdomen: Soft, non-distended, non-tender, no rebound, no guarding.   Skin: Warm/dry, normal color, no jaundice.    MEDICATIONS  (STANDING):  acetaminophen     Tablet .. 650 milliGRAM(s) Oral every 6 hours  atorvastatin 10 milliGRAM(s) Oral at bedtime  DULoxetine 30 milliGRAM(s) Oral daily  heparin   Injectable 5000 Unit(s) SubCutaneous every 8 hours  hydrochlorothiazide 12.5 milliGRAM(s) Oral daily  levothyroxine 50 MICROGram(s) Oral daily  lidocaine   4% Patch 1 Patch Transdermal every 24 hours  losartan 100 milliGRAM(s) Oral daily  metoprolol tartrate 25 milliGRAM(s) Oral every 12 hours  pantoprazole    Tablet 40 milliGRAM(s) Oral before breakfast  sodium chloride 2 Gram(s) Oral every 8 hours    MEDICATIONS  (PRN):  ALPRAZolam 0.25 milliGRAM(s) Oral once PRN ONLY FOR USE PRIOR TO MRI IF FEELING ANXIOUS      DVT PROPHYLAXIS: heparin   Injectable 5000 Unit(s) SubCutaneous every 8 hours    GI PROPHYLAXIS: pantoprazole    Tablet 40 milliGRAM(s) Oral before breakfast    ANTICOAGULATION:   ANTIBIOTICS:            LAB/STUDIES:  Labs:  CAPILLARY BLOOD GLUCOSE                              9.8    1.96  )-----------( 86       ( 17 Oct 2022 22:37 )             28.4         10-18    131<L>  |  96<L>  |  8<L>  ----------------------------<  104<H>  4.0   |  26  |  <0.5<L>      Calcium, Total Serum: 8.0 mg/dL (10-18-22 @ 11:39)      LFTs:         Coags:     20.00  ----< 1.73    ( 17 Oct 2022 22:37 )     x                   Urinalysis Basic - ( 16 Oct 2022 16:10 )    Color: Yellow / Appearance: Slightly Turbid / S.029 / pH: x  Gluc: x / Ketone: Moderate  / Bili: Negative / Urobili: 3 mg/dL   Blood: x / Protein: 100 mg/dL / Nitrite: Negative   Leuk Esterase: Negative / RBC: 55 /HPF / WBC 6 /HPF   Sq Epi: x / Non Sq Epi: 0 /HPF / Bacteria: Negative                IMAGING:  < from: Xray Chest 1 View- PORTABLE-Routine (Xray Chest 1 View- PORTABLE-Routine in AM.) (10.17.22 @ 06:04) >  IMPRESSION:    No focal consolidation, pleural effusion, or pneumothorax.    --- End of Report ---    < end of copied text >    · Assessment	  87F MDS, HTN, AFIB on Coumadin, Trigeminal Neuralgia, Hypothyroidism, and OA s/p fall ?HT, ?LOC, +AC w/ trauma workup significant for small acute SAH within R quadrigeminal plate cistern and R minimally displaced acute 7-10 rib fractures.    Plan:  - Downgraded  - Continue monitoring toleration  - F/U MR imaging per neuroendovascular. Contact team once imaging completed.  - F/U 4A  - PT/Rehab    Trauma/ACS [id number]

## 2022-10-18 NOTE — PROGRESS NOTE ADULT - ASSESSMENT
Assessment & Plan    87y Female with PMH of HTN. HLD, Hypothyroid disease, Afib (on warfarin- last dose last night), mild myelodysplastic syndrome (on Vizada Q 28 days- last treatment 1 week ago), Trigeminal Neuralgia ( on Trileptal, prior gamma knife procedure), B/L knee Osteoarthritis- walker device dependent was brought in by EMS after an unwitnessed fall at home. Imaging shows small acute SAH within the right quadrigeminal plate cistern and right 7-10 rib fxs.     NEURO:   # Acute SAH  - Rpt Head CTA Head and Neck: hyperdensity in quadrigeminal plate favors small SAH      more than venous plexus      mild atheroslerotic stenosis in B/L cervical ICA's and B/L carotid siphons- mild          atherosclerotic stenosis in B/L V4 vertebral arteries.  - NSGY- no interventions, no further imaging needed unless exam changes        Obtain Neuro IR consult- pending,         Q4h neuro checks        can start HSQ, can resume Coumadin in 1 week (10/22/22)        10/17 Rec MRI w and w/o, MRA, MRV   - 10/17 hold home Trileptal (can cause hyponatremia, pt on for trigeminal neuralgia), no need for seizure ppx as per Neuro    # PMH Trigeminal Neuralgia  - 10/17 holding Trileptal 2/2 hyponatremia    # PMH Neuropathy  - con't Cymbalta    #Pain-controlled     with Tylenol and Lidocaine patch  - 10/17 d/c gabapentin as per neuro    RESP:   # right7-10 rib fxs   - 10/17 d/c daily CXR  - incentive spirometry- pulling 750   - pain control      CARDS:   # A-fib, rate controlled  - con't Metoprolol 25 Q12h  -  home coumadin- held- can resume in 1 week as per NSGY, 10/22  - INR 2.1 and 2.4- Coumadin reversed with Kcentra due to intra-cranial bleed  - INR trend: 2.1--> 2.4-->1.48 > 1.73  - Daily EKG    # PMH of HTN  - con't home regimen, Metoprolol and Losartan  - HCTZ held due to hyponatremia    # PMH HLD  - con't Lipitor    Trop negative x 3      GI/NUTR:   No acute issues  - Regular Diet  - 10/17 Free water restriction 1L    /RENAL:   # Hyponatremia- improving  - hypertonic saline @ 30 mL/hr and 1 gm Q8h salt tabs, 10/17 halved and then dc'ed 2/2 improvement  - serum Na+ trend: 125 -> 130 -> 135 -> 135  - Q6h Na+ monitoring  Voiding spontaneously    Labs:          BUN/Cr- 10/0.5  -->,  12/0.5  -->          Electrolytes-Na 135 // K 5.2 // Mg 1.8 //  Phos 2.6    HEME/ONC:   # PMH of Myelodysplastic syndrome  - receives Vizada Q28 days, last treatment 1 week ago      # Leukopenia- WBC 2.0 , improving - 1.96  - likely secondary to MDS treatment  - Hematology consult appreciated- nothing to do  - con't to trend      Labs: Hb/Hct:  9.8/28          Plts:  86              INR:  2.1--> 2.4--> Kcentra--> 1.48 > 1.73      ID:   WBC- 1.83  --->>,  2.02  --->> 1.86 -->> 1.96  afebrile  -No signs of infection  -UA- with + bacteria, no leuk, no nitrites- Ceftriaxone x 1 given in the ED  -Repeat UA +lg blood     ENDO:  # PMH Hypothyroidism  rcon't Synthroid 50 mcg QD    # Glucose monitoring  - Q6h FS   -     DVT ppx:   HSQ Q8h  SCDs to lower ext      GI ppx:  Protonix      LINES/DRAINS: PIV,     DISPO: SICU

## 2022-10-18 NOTE — PROGRESS NOTE ADULT - SUBJECTIVE AND OBJECTIVE BOX
JANA ESCOBEDO  [id number]  87y Female    Indication for ICU admission:  Admit Date:10-15-22  ICU Date: 10/15  OR Date: n/a    HPI:   87y Female with PMH of HTN. HLD, Hypothyroid disease, Afib (on warfarin), mild myelodysplastic syndrome (on Vizada Q 28 days- last treatment 1 week ago), Trigeminal Neuralgia ( on Trileptal, prior gamma knife procedure), B/L knee Osteoarthritis- walker device dependent was brought in by EMS after an unwitnessed fall at home. Pt reports walking to the kitchen using her walker and felt her knee buckle.  She reports falling on her back, denies hitting her head. However, family reports pt was mildly confused that lasted seconds.  Pt arrived with GCS 15, no obvious signs of trauma. Primary survey was intact. Imaging showed a small acute SAH within the right quadrigeminal plate cistern.  CT C-spine negative for acute findings, but shows multilevel degenerative changes., and CT chest shows right minimally displaced acute 7-10 rib fx.   Pt was evaluated by NSGY team who recommends repeat  CT Head and Neck,  seizure ppx, and Q1h neuro checks.    ICU Course:   In the ICU repeat imaging Head CTA Head and Neck: hyperdensity in quadrigeminal plate favors small SAH      more than venous plexus. Neuroendovascular recommends MRI, MRA, MRV.  ICU course complicated by hyponatremia which has resolved s/p hypertonic saline gtt. Currently on salt tabs. D/K[place] home trilleptal for trigeminal neuralgia 2/2 potential for worsening hyponatremia as per neuro and pharmacy recommendations.     Tegretol (Hives; Rash)    PAST MEDICAL & SURGICAL HISTORY:  Atrial fibrillation    Hypertension    Hypothyroid    Anxiety    H/O colonoscopy  10 yrs ago    History of cholecystectomy      Home Medications:  atorvastatin 10 mg oral tablet: 1 tab(s) orally once a day (15 Oct 2022 14:38)  Cymbalta 30 mg oral delayed release capsule: 1 cap(s) orally 2 times a day (15 Oct 2022 14:38)  levothyroxine 50 mcg (0.05 mg) oral tablet: 1 tab(s) orally once a day (20 Nov 2018 11:17)  losartan-hydrochlorothiazide 100 mg-12.5 mg oral tablet: 1 tab(s) orally once a day (15 Oct 2022 14:37)  metoprolol tartrate 25 mg oral tablet: orally once a day (20 Nov 2018 11:17)  Myrbetriq 50 mg oral tablet, extended release: 1 tab(s) orally once a day (15 Oct 2022 14:37)  Trileptal 150 mg oral tablet: 1 tab(s) orally 2 times a day (15 Oct 2022 14:39)  warfarin 5 mg oral tablet: 1 tab(s) orally once a day (20 Nov 2018 11:17)    DVT Prophylaxis: heparin   Injectable 5000 Unit(s) SubCutaneous every 8 hours      GI Prophylaxis:pantoprazole    Tablet 40 milliGRAM(s) Oral before breakfast      ***Tubes/Lines/Drains  ***  Peripheral IV  Arterial Line		                  Central Line                            Urinary Catheter		Indication: Strict I&O          [X] A ten-point review of systems was otherwise negative except as noted above.  [  ] Due to altered mental status/intubation, subjective information was not attained from the patient. History was obtained, to the extent possible, from review of the chart and collateral sources of information.

## 2022-10-18 NOTE — CHART NOTE - NSCHARTNOTEFT_GEN_A_CORE
Neuroendovascular team consulted yesterday regarding acute SAH for evaluation of any possible vascular malformations to explain bleed.     Requested MRI brain w/w/out ([place] / [facility]), MRA and MRV w/w/out - studies not yet completed. Discussed with MRI team.     Please contact our team once requested imaging has been completed.   [id number]

## 2022-10-18 NOTE — CHART NOTE - NSCHARTNOTEFT_GEN_A_CORE
JANA ESCOBEDO  [id number]  87y Female    Indication for ICU admission:  Admit Date:10-15-22  ICU Date: 10/15  OR Date: n/a    HPI:   87y Female with PMH of HTN. HLD, Hypothyroid disease, Afib (on warfarin), mild myelodysplastic syndrome (on Vizada Q 28 days- last treatment 1 week ago), Trigeminal Neuralgia ( on Trileptal, prior gamma knife procedure), B/L knee Osteoarthritis- walker device dependent was brought in by EMS after an unwitnessed fall at home. Pt reports walking to the kitchen using her walker and felt her knee buckle.  She reports falling on her back, denies hitting her head. However, family reports pt was mildly confused that lasted seconds.  Pt arrived with GCS 15, no obvious signs of trauma. Primary survey was intact. Imaging showed a small acute SAH within the right quadrigeminal plate cistern.  CT C-spine negative for acute findings, but shows multilevel degenerative changes., and CT chest shows right minimally displaced acute 7-10 rib fx.   Pt was evaluated by NSGY team who recommends repeat  CT Head and Neck,  seizure ppx, and Q1h neuro checks.    ICU Course:   In the ICU repeat imaging Head CTA Head and Neck: hyperdensity in quadrigeminal plate favors small SAH      more than venous plexus. [facility] recommends MRI, MRA, MRV.  ICU course complicated by hyponatremia which has resolved s/p hypertonic saline gtt. Currently on salt tabs. D/Kwesi home trilleptal for trigeminal neuralgia 2/2 potential for worsening hyponatremia as per neuro and pharmacy recommendations.     Tegretol (Hives; Rash)    PAST MEDICAL & SURGICAL HISTORY:  Atrial fibrillation    Hypertension    Hypothyroid    Anxiety    H/O colonoscopy  10 yrs ago    History of cholecystectomy      Home Medications:  atorvastatin 10 mg oral tablet: 1 tab(s) orally once a day (15 Oct 2022 14:38)  Cymbalta 30 mg oral delayed release capsule: 1 cap(s) orally 2 times a day (15 Oct 2022 14:38)  levothyroxine 50 mcg (0.05 mg) oral tablet: 1 tab(s) orally once a day (20 Nov 2018 11:17)  losartan-hydrochlorothiazide 100 mg-12.5 mg oral tablet: 1 tab(s) orally once a day (15 Oct 2022 14:37)  metoprolol tartrate 25 mg oral tablet: orally once a day (20 Nov 2018 11:17)  Myrbetriq 50 mg oral tablet, extended release: 1 tab(s) orally once a day (15 Oct 2022 14:37)  Trileptal 150 mg oral tablet: 1 tab(s) orally 2 times a day (15 Oct 2022 14:39)  warfarin 5 mg oral tablet: 1 tab(s) orally once a day (20 Nov 2018 11:17)    DVT Prophylaxis: heparin   Injectable 5000 Unit(s) SubCutaneous every 8 hours      GI Prophylaxis:pantoprazole    Tablet 40 milliGRAM(s) Oral before breakfast    87y Female with PMH of HTN. HLD, Hypothyroid disease, Afib (on warfarin- last dose last night), mild myelodysplastic syndrome (on Vizada Q 28 days- last treatment 1 week ago), Trigeminal Neuralgia ( on Trileptal, prior gamma knife procedure), B/L knee Osteoarthritis- walker device dependent was brought in by EMS after an unwitnessed fall at home. Imaging shows small acute SAH within the right quadrigeminal plate cistern and right 7-10 rib fxs.     NEURO:   # Acute SAH  - Rpt Head CTA Head and Neck: hyperdensity in quadrigeminal plate favors small SAH      more than venous plexus      mild atheroslerotic stenosis in B/L cervical ICA's and B/L carotid siphons- mild          atherosclerotic stenosis in B/L V4 vertebral arteries.  - NSGY- no interventions, no further imaging needed unless exam changes        Obtain Neuro IR consult- pending,         Q4h neuro checks        can start HSQ, can resume Coumadin in 1 week (10/22/22)        10/17 Rec MRI w and w/o, MRA, MRV   - 10/17 hold home Trileptal (can cause hyponatremia, pt on for trigeminal neuralgia), no need for seizure ppx as per Neuro    # PMH Trigeminal Neuralgia  - 10/17 holding Trileptal 2/2 hyponatremia    # PMH Neuropathy  - con't Cymbalta    #Pain-controlled     with Tylenol and Lidocaine patch  - 10/17 d/c gabapentin as per neuro    RESP:   # right7-10 rib fxs   - 10/17 d/c daily CXR  - incentive spirometry- pulling 750   - pain control    CARDS:   # A-fib, rate controlled  - con't Metoprolol 25 Q12h  -  home coumadin- held- can resume in 1 week as per NSGY, 10/22  - INR 2.1 and 2.4- Coumadin reversed with Kcentra due to intra-cranial bleed  - INR trend: 2.1--> 2.4-->1.48 > 1.73  - Daily EKG    # PMH of HTN  - con't home regimen, Metoprolol and Losartan/HCTZ  - HCTZ held due to hyponatremia    # PMH HLD  - con't Lipitor    Trop negative x 3      GI/NUTR:   No acute issues  - Regular Diet  - 10/17 Free water restriction 1L    /RENAL:   # Hyponatremia- improving  - NaCl PO 2G q8    HEME/ONC:   # PMH of Myelodysplastic syndrome  - receives Vizada Q28 days, last treatment 1 week ago  [ ] Consulted Dr Chavez - pending recommendations      # Leukopenia- WBC 2.0 , improving - 1.96  - likely secondary to MDS treatment  - Hematology consult appreciated- nothing to do  - con't to trend      Labs: Hb/Hct:  9.8/28          Plts:  86              INR:  2.1--> 2.4--> Kcentra--> 1.48 > 1.73      ID:   WBC- 1.83  --->>,  2.02  --->> 1.86 -->> 1.96  afebrile  -No signs of infection  -UA- with + bacteria, no leuk, no nitrites- Ceftriaxone x 1 given in the ED  -Repeat UA +lg blood     ENDO:  # PMH Hypothyroidism  rcon't Synthroid 50 mcg QD    # Glucose monitoring  - Q6h FS     DVT ppx:   HSQ Q8h  SCDs to lower ext      GI ppx:  Protonix    LINES/DRAINS: PIV,     DISPO: SURG    Pending:  [ ] MRI/MRA/MRV - f/u with [facility]  [ ] Heme Onc Dr Chavez for MDS mgmt   [ ] f/u lytes    Patient s/o to [facility] Team JANA ESCOBEDO  [id number]  87y Female    Indication for ICU admission:  Admit Date:10-15-22  ICU Date: 10/15  OR Date: n/a    HPI:   87y Female with PMH of HTN. HLD, Hypothyroid disease, Afib (on warfarin), mild myelodysplastic syndrome (on Vizada Q 28 days- last treatment 1 week ago), Trigeminal Neuralgia ( on Trileptal, prior gamma knife procedure), B/L knee Osteoarthritis- walker device dependent was brought in by EMS after an unwitnessed fall at home. Pt reports walking to the kitchen using her walker and felt her knee buckle.  She reports falling on her back, denies hitting her head. However, family reports pt was mildly confused that lasted seconds.  Pt arrived with GCS 15, no obvious signs of trauma. Primary survey was intact. Imaging showed a small acute SAH within the right quadrigeminal plate cistern.  CT C-spine negative for acute findings, but shows multilevel degenerative changes., and CT chest shows right minimally displaced acute 7-10 rib fx.   Pt was evaluated by NSGY team who recommends repeat  CT Head and Neck,  seizure ppx, and Q1h neuro checks.    ICU Course:   In the ICU repeat imaging Head CTA Head and Neck: hyperdensity in quadrigeminal plate favors small SAH      more than venous plexus. [facility] recommends MRI, MRA, MRV.  ICU course complicated by hyponatremia which has resolved s/p hypertonic saline gtt. Currently on salt tabs. D/Kwesi home trilleptal for trigeminal neuralgia 2/2 potential for worsening hyponatremia as per neuro and pharmacy recommendations.     Tegretol (Hives; Rash)    PAST MEDICAL & SURGICAL HISTORY:  Atrial fibrillation    Hypertension    Hypothyroid    Anxiety    H/O colonoscopy  10 yrs ago    History of cholecystectomy      Home Medications:  atorvastatin 10 mg oral tablet: 1 tab(s) orally once a day (15 Oct 2022 14:38)  Cymbalta 30 mg oral delayed release capsule: 1 cap(s) orally 2 times a day (15 Oct 2022 14:38)  levothyroxine 50 mcg (0.05 mg) oral tablet: 1 tab(s) orally once a day (20 Nov 2018 11:17)  losartan-hydrochlorothiazide 100 mg-12.5 mg oral tablet: 1 tab(s) orally once a day (15 Oct 2022 14:37)  metoprolol tartrate 25 mg oral tablet: orally once a day (20 Nov 2018 11:17)  Myrbetriq 50 mg oral tablet, extended release: 1 tab(s) orally once a day (15 Oct 2022 14:37)  Trileptal 150 mg oral tablet: 1 tab(s) orally 2 times a day (15 Oct 2022 14:39)  warfarin 5 mg oral tablet: 1 tab(s) orally once a day (20 Nov 2018 11:17)    DVT Prophylaxis: heparin   Injectable 5000 Unit(s) SubCutaneous every 8 hours      GI Prophylaxis:pantoprazole    Tablet 40 milliGRAM(s) Oral before breakfast    87y Female with PMH of HTN. HLD, Hypothyroid disease, Afib (on warfarin- last dose last night), mild myelodysplastic syndrome (on Vizada Q 28 days- last treatment 1 week ago), Trigeminal Neuralgia ( on Trileptal, prior gamma knife procedure), B/L knee Osteoarthritis- walker device dependent was brought in by EMS after an unwitnessed fall at home. Imaging shows small acute SAH within the right quadrigeminal plate cistern and right 7-10 rib fxs.     NEURO:   # Acute SAH  - Rpt Head CTA Head and Neck: hyperdensity in quadrigeminal plate favors small SAH      more than venous plexus      mild atheroslerotic stenosis in B/L cervical ICA's and B/L carotid siphons- mild          atherosclerotic stenosis in B/L V4 vertebral arteries.  - NSGY- no interventions, no further imaging needed unless exam changes        Obtain Neuro IR consult- pending,         Q4h neuro checks        can start HSQ, can resume Coumadin in 1 week (10/22/22)        10/17 Rec MRI w and w/o, MRA, MRV   - 10/17 hold home Trileptal (can cause hyponatremia, pt on for trigeminal neuralgia), no need for seizure ppx as per Neuro    # PMH Trigeminal Neuralgia  - 10/17 holding Trileptal 2/2 hyponatremia    # PMH Neuropathy  - con't Cymbalta    #Pain-controlled     with Tylenol and Lidocaine patch  - 10/17 d/c gabapentin as per neuro    RESP:   # right7-10 rib fxs   - 10/17 d/c daily CXR  - incentive spirometry- pulling 750   - pain control    CARDS:   # A-fib, rate controlled  - con't Metoprolol 25 Q12h  -  home coumadin- held- can resume in 1 week as per NSGY, 10/22  - INR 2.1 and 2.4- Coumadin reversed with Kcentra due to intra-cranial bleed  - INR trend: 2.1--> 2.4-->1.48 > 1.73  - Daily EKG    # PMH of HTN  - con't home regimen, Metoprolol and Losartan/HCTZ  - HCTZ held due to hyponatremia    # PMH HLD  - con't Lipitor    Trop negative x 3      GI/NUTR:   No acute issues  - Regular Diet  - 10/17 Free water restriction 1L    /RENAL:   # Hyponatremia- improving  - NaCl PO 2G q8    HEME/ONC:   # PMH of Myelodysplastic syndrome  - receives Vizada Q28 days, last treatment 1 week ago  [ ] Consulted Dr Chavez - pending recommendations      # Leukopenia- WBC 2.0 , improving - 1.96  - likely secondary to MDS treatment  - Hematology consult appreciated- nothing to do  - con't to trend      Labs: Hb/Hct:  9.8/28          Plts:  86              INR:  2.1--> 2.4--> Kcentra--> 1.48 > 1.73      ID:   WBC- 1.83  --->>,  2.02  --->> 1.86 -->> 1.96  afebrile  -No signs of infection  -UA- with + bacteria, no leuk, no nitrites- Ceftriaxone x 1 given in the ED  -Repeat UA +lg blood     ENDO:  # PMH Hypothyroidism  rcon't Synthroid 50 mcg QD    # Glucose monitoring  - Q6h FS     DVT ppx:   HSQ Q8h  SCDs to lower ext      GI ppx:  Protonix    LINES/DRAINS: PIV,     DISPO: SURG    Pending:  [ ] MRI/MRA/MRV - f/u with [facility]  [ ] Heme Onc Dr Cahvez for MDS mgmt   [ ] f/u labs 2000    Patient s/o to [facility] Blue Team JANA ESCOBEDO  [id number]  87y Female    Indication for ICU admission:  Admit Date:10-15-22  ICU Date: 10/15  OR Date: n/a    HPI:   87y Female with PMH of HTN. HLD, Hypothyroid disease, Afib (on warfarin), mild myelodysplastic syndrome (on Vizada Q 28 days- last treatment 1 week ago), Trigeminal Neuralgia ( on Trileptal, prior gamma knife procedure), B/L knee Osteoarthritis- walker device dependent was brought in by EMS after an unwitnessed fall at home. Pt reports walking to the kitchen using her walker and felt her knee buckle.  She reports falling on her back, denies hitting her head. However, family reports pt was mildly confused that lasted seconds.  Pt arrived with GCS 15, no obvious signs of trauma. Primary survey was intact. Imaging showed a small acute SAH within the right quadrigeminal plate cistern.  CT C-spine negative for acute findings, but shows multilevel degenerative changes., and CT chest shows right minimally displaced acute 7-10 rib fx.   Pt was evaluated by NSGY team who recommends repeat  CT Head and Neck,  seizure ppx, and Q1h neuro checks.    ICU Course:   In the ICU repeat imaging Head CTA Head and Neck: hyperdensity in quadrigeminal plate favors small SAH      more than venous plexus. [facility] recommends MRI, MRA, MRV.  ICU course complicated by hyponatremia which has resolved s/p hypertonic saline gtt. Currently on salt tabs. D/Kwesi home trilleptal for trigeminal neuralgia 2/2 potential for worsening hyponatremia as per neuro and pharmacy recommendations.     Tegretol (Hives; Rash)    PAST MEDICAL & SURGICAL HISTORY:  Atrial fibrillation    Hypertension    Hypothyroid    Anxiety    H/O colonoscopy  10 yrs ago    History of cholecystectomy      Home Medications:  atorvastatin 10 mg oral tablet: 1 tab(s) orally once a day (15 Oct 2022 14:38)  Cymbalta 30 mg oral delayed release capsule: 1 cap(s) orally 2 times a day (15 Oct 2022 14:38)  levothyroxine 50 mcg (0.05 mg) oral tablet: 1 tab(s) orally once a day (20 Nov 2018 11:17)  losartan-hydrochlorothiazide 100 mg-12.5 mg oral tablet: 1 tab(s) orally once a day (15 Oct 2022 14:37)  metoprolol tartrate 25 mg oral tablet: orally once a day (20 Nov 2018 11:17)  Myrbetriq 50 mg oral tablet, extended release: 1 tab(s) orally once a day (15 Oct 2022 14:37)  Trileptal 150 mg oral tablet: 1 tab(s) orally 2 times a day (15 Oct 2022 14:39)  warfarin 5 mg oral tablet: 1 tab(s) orally once a day (20 Nov 2018 11:17)    DVT Prophylaxis: heparin   Injectable 5000 Unit(s) SubCutaneous every 8 hours      GI Prophylaxis:pantoprazole    Tablet 40 milliGRAM(s) Oral before breakfast    87y Female with PMH of HTN. HLD, Hypothyroid disease, Afib (on warfarin- last dose last night), mild myelodysplastic syndrome (on Vizada Q 28 days- last treatment 1 week ago), Trigeminal Neuralgia ( on Trileptal, prior gamma knife procedure), B/L knee Osteoarthritis- walker device dependent was brought in by EMS after an unwitnessed fall at home. Imaging shows small acute SAH within the right quadrigeminal plate cistern and right 7-10 rib fxs.     NEURO:   # Acute SAH  - Rpt Head CTA Head and Neck: hyperdensity in quadrigeminal plate favors small SAH      more than venous plexus      mild atheroslerotic stenosis in B/L cervical ICA's and B/L carotid siphons- mild          atherosclerotic stenosis in B/L V4 vertebral arteries.  - NSGY- no interventions, no further imaging needed unless exam changes        Obtain Neuro IR consult- pending,         Q4h neuro checks        can start HSQ, can resume Coumadin in 1 week (10/22/22)        10/17 Rec MRI w and w/o, MRA, MRV   - 10/17 hold home Trileptal (can cause hyponatremia, pt on for trigeminal neuralgia), no need for seizure ppx as per Neuro    # PMH Trigeminal Neuralgia  - 10/17 holding Trileptal 2/2 hyponatremia    # PMH Neuropathy  - con't Cymbalta    #Pain-controlled     with Tylenol and Lidocaine patch  - 10/17 d/c gabapentin as per neuro    RESP:   # right7-10 rib fxs   - 10/17 d/c daily CXR  - incentive spirometry- pulling 750   - pain control    CARDS:   # A-fib, rate controlled  - con't Metoprolol 25 Q12h  -  home coumadin- held- can resume in 1 week as per NSGY, 10/22  - INR 2.1 and 2.4- Coumadin reversed with Kcentra due to intra-cranial bleed  - INR trend: 2.1--> 2.4-->1.48 > 1.73  - Daily EKG    # PMH of HTN  - con't home regimen, Metoprolol and Losartan/HCTZ  - HCTZ held due to hyponatremia    # PMH HLD  - con't Lipitor    Trop negative x 3      GI/NUTR:   No acute issues  - Regular Diet  - 10/17 Free water restriction 1L    /RENAL:   # Hyponatremia- improving  - NaCl PO 2G q8    HEME/ONC:   # PMH of Myelodysplastic syndrome  - receives Vizada Q28 days, last treatment 1 week ago  [ ] Consulted Dr Chavez - pending recommendations      # Leukopenia- WBC 2.0 , improving - 1.96  - likely secondary to MDS treatment  - Hematology consult appreciated- nothing to do  - con't to trend      Labs: Hb/Hct:  9.8/28          Plts:  86              INR:  2.1--> 2.4--> Kcentra--> 1.48 > 1.73      ID:   WBC- 1.83  --->>,  2.02  --->> 1.86 -->> 1.96  afebrile  -No signs of infection  -UA- with + bacteria, no leuk, no nitrites- Ceftriaxone x 1 given in the ED  -Repeat UA +lg blood     ENDO:  # PMH Hypothyroidism  rcon't Synthroid 50 mcg QD    # Glucose monitoring  - Q6h FS     DVT ppx:   HSQ Q8h  SCDs to lower ext      GI ppx:  Protonix    LINES/DRAINS: PIV,     DISPO: SURG    Pending:  [ ] MRI/MRA/MRV - f/u with [facility]  [ ] Heme Onc Dr Chavez for MDS mgmt   [ ] f/u labs 2000    Patient s/o to [facility] Blue Team [place]

## 2022-10-19 LAB
ANION GAP SERPL CALC-SCNC: 7 MMOL/L — SIGNIFICANT CHANGE UP (ref 7–14)
ANION GAP SERPL CALC-SCNC: 8 MMOL/L — SIGNIFICANT CHANGE UP (ref 7–14)
ANION GAP SERPL CALC-SCNC: 9 MMOL/L — SIGNIFICANT CHANGE UP (ref 7–14)
BUN SERPL-MCNC: 10 MG/DL — SIGNIFICANT CHANGE UP (ref 10–20)
BUN SERPL-MCNC: 10 MG/DL — SIGNIFICANT CHANGE UP (ref 10–20)
BUN SERPL-MCNC: 13 MG/DL — SIGNIFICANT CHANGE UP (ref 10–20)
BUN SERPL-MCNC: 8 MG/DL — LOW (ref 10–20)
BUN SERPL-MCNC: 9 MG/DL — LOW (ref 10–20)
CALCIUM SERPL-MCNC: 8 MG/DL — LOW (ref 8.4–10.4)
CALCIUM SERPL-MCNC: 8.2 MG/DL — LOW (ref 8.4–10.5)
CALCIUM SERPL-MCNC: 8.3 MG/DL — LOW (ref 8.4–10.5)
CALCIUM SERPL-MCNC: 8.3 MG/DL — LOW (ref 8.4–10.5)
CALCIUM SERPL-MCNC: 8.4 MG/DL — SIGNIFICANT CHANGE UP (ref 8.4–10.5)
CHLORIDE SERPL-SCNC: 100 MMOL/L — SIGNIFICANT CHANGE UP (ref 98–110)
CHLORIDE SERPL-SCNC: 89 MMOL/L — LOW (ref 98–110)
CHLORIDE SERPL-SCNC: 90 MMOL/L — LOW (ref 98–110)
CHLORIDE SERPL-SCNC: 92 MMOL/L — LOW (ref 98–110)
CHLORIDE SERPL-SCNC: 94 MMOL/L — LOW (ref 98–110)
CO2 SERPL-SCNC: 24 MMOL/L — SIGNIFICANT CHANGE UP (ref 17–32)
CO2 SERPL-SCNC: 26 MMOL/L — SIGNIFICANT CHANGE UP (ref 17–32)
CO2 SERPL-SCNC: 26 MMOL/L — SIGNIFICANT CHANGE UP (ref 17–32)
CO2 SERPL-SCNC: 27 MMOL/L — SIGNIFICANT CHANGE UP (ref 17–32)
CO2 SERPL-SCNC: 28 MMOL/L — SIGNIFICANT CHANGE UP (ref 17–32)
CREAT SERPL-MCNC: 0.5 MG/DL — LOW (ref 0.7–1.5)
CREAT SERPL-MCNC: 0.6 MG/DL — LOW (ref 0.7–1.5)
CREAT SERPL-MCNC: <0.5 MG/DL — LOW (ref 0.7–1.5)
EGFR: 88 ML/MIN/1.73M2 — SIGNIFICANT CHANGE UP
EGFR: 92 ML/MIN/1.73M2 — SIGNIFICANT CHANGE UP
EGFR: 97 ML/MIN/1.73M2 — SIGNIFICANT CHANGE UP
GLUCOSE SERPL-MCNC: 102 MG/DL — HIGH (ref 70–99)
GLUCOSE SERPL-MCNC: 102 MG/DL — HIGH (ref 70–99)
GLUCOSE SERPL-MCNC: 108 MG/DL — HIGH (ref 70–99)
GLUCOSE SERPL-MCNC: 93 MG/DL — SIGNIFICANT CHANGE UP (ref 70–99)
GLUCOSE SERPL-MCNC: 97 MG/DL — SIGNIFICANT CHANGE UP (ref 70–99)
HCT VFR BLD CALC: 29 % — LOW (ref 37–47)
HCT VFR BLD CALC: 29.6 % — LOW (ref 37–47)
HGB BLD-MCNC: 10.1 G/DL — LOW (ref 12–16)
HGB BLD-MCNC: 10.2 G/DL — LOW (ref 12–16)
MAGNESIUM SERPL-MCNC: 1.6 MG/DL — LOW (ref 1.8–2.4)
MAGNESIUM SERPL-MCNC: 1.9 MG/DL — SIGNIFICANT CHANGE UP (ref 1.8–2.4)
MCHC RBC-ENTMCNC: 33.4 PG — HIGH (ref 27–31)
MCHC RBC-ENTMCNC: 33.6 PG — HIGH (ref 27–31)
MCHC RBC-ENTMCNC: 34.5 G/DL — SIGNIFICANT CHANGE UP (ref 32–37)
MCHC RBC-ENTMCNC: 34.8 G/DL — SIGNIFICANT CHANGE UP (ref 32–37)
MCV RBC AUTO: 96.3 FL — SIGNIFICANT CHANGE UP (ref 81–99)
MCV RBC AUTO: 97 FL — SIGNIFICANT CHANGE UP (ref 81–99)
NRBC # BLD: 0 /100 WBCS — SIGNIFICANT CHANGE UP (ref 0–0)
NRBC # BLD: 0 /100 WBCS — SIGNIFICANT CHANGE UP (ref 0–0)
OSMOLALITY UR: 495 MOS/KG — SIGNIFICANT CHANGE UP (ref 50–1200)
PHOSPHATE SERPL-MCNC: 3.3 MG/DL — SIGNIFICANT CHANGE UP (ref 2.1–4.9)
PHOSPHATE SERPL-MCNC: 3.5 MG/DL — SIGNIFICANT CHANGE UP (ref 2.1–4.9)
PLATELET # BLD AUTO: 89 K/UL — LOW (ref 130–400)
PLATELET # BLD AUTO: 94 K/UL — LOW (ref 130–400)
POTASSIUM SERPL-MCNC: 3.8 MMOL/L — SIGNIFICANT CHANGE UP (ref 3.5–5)
POTASSIUM SERPL-MCNC: 4 MMOL/L — SIGNIFICANT CHANGE UP (ref 3.5–5)
POTASSIUM SERPL-MCNC: 4.1 MMOL/L — SIGNIFICANT CHANGE UP (ref 3.5–5)
POTASSIUM SERPL-MCNC: 4.2 MMOL/L — SIGNIFICANT CHANGE UP (ref 3.5–5)
POTASSIUM SERPL-MCNC: 4.5 MMOL/L — SIGNIFICANT CHANGE UP (ref 3.5–5)
POTASSIUM SERPL-SCNC: 3.8 MMOL/L — SIGNIFICANT CHANGE UP (ref 3.5–5)
POTASSIUM SERPL-SCNC: 4 MMOL/L — SIGNIFICANT CHANGE UP (ref 3.5–5)
POTASSIUM SERPL-SCNC: 4.1 MMOL/L — SIGNIFICANT CHANGE UP (ref 3.5–5)
POTASSIUM SERPL-SCNC: 4.2 MMOL/L — SIGNIFICANT CHANGE UP (ref 3.5–5)
POTASSIUM SERPL-SCNC: 4.5 MMOL/L — SIGNIFICANT CHANGE UP (ref 3.5–5)
RBC # BLD: 3.01 M/UL — LOW (ref 4.2–5.4)
RBC # BLD: 3.05 M/UL — LOW (ref 4.2–5.4)
RBC # FLD: 14.1 % — SIGNIFICANT CHANGE UP (ref 11.5–14.5)
RBC # FLD: 14.1 % — SIGNIFICANT CHANGE UP (ref 11.5–14.5)
SODIUM SERPL-SCNC: 124 MMOL/L — LOW (ref 135–146)
SODIUM SERPL-SCNC: 124 MMOL/L — LOW (ref 135–146)
SODIUM SERPL-SCNC: 126 MMOL/L — LOW (ref 135–146)
SODIUM SERPL-SCNC: 127 MMOL/L — LOW (ref 135–146)
SODIUM SERPL-SCNC: 135 MMOL/L — SIGNIFICANT CHANGE UP (ref 135–146)
SODIUM UR-SCNC: 179 MMOL/L — SIGNIFICANT CHANGE UP
WBC # BLD: 2.04 K/UL — LOW (ref 4.8–10.8)
WBC # BLD: 2.04 K/UL — LOW (ref 4.8–10.8)
WBC # FLD AUTO: 2.04 K/UL — LOW (ref 4.8–10.8)
WBC # FLD AUTO: 2.04 K/UL — LOW (ref 4.8–10.8)

## 2022-10-19 PROCEDURE — 99232 SBSQ HOSP IP/OBS MODERATE 35: CPT

## 2022-10-19 PROCEDURE — 99221 1ST HOSP IP/OBS SF/LOW 40: CPT

## 2022-10-19 PROCEDURE — 71045 X-RAY EXAM CHEST 1 VIEW: CPT | Mod: 26

## 2022-10-19 PROCEDURE — 99233 SBSQ HOSP IP/OBS HIGH 50: CPT

## 2022-10-19 RX ORDER — MAGNESIUM SULFATE 500 MG/ML
2 VIAL (ML) INJECTION ONCE
Refills: 0 | Status: COMPLETED | OUTPATIENT
Start: 2022-10-19 | End: 2022-10-19

## 2022-10-19 RX ORDER — SODIUM CHLORIDE 9 MG/ML
3 INJECTION INTRAMUSCULAR; INTRAVENOUS; SUBCUTANEOUS EVERY 8 HOURS
Refills: 0 | Status: DISCONTINUED | OUTPATIENT
Start: 2022-10-19 | End: 2022-10-20

## 2022-10-19 RX ORDER — SODIUM CHLORIDE 9 MG/ML
1000 INJECTION INTRAMUSCULAR; INTRAVENOUS; SUBCUTANEOUS
Refills: 0 | Status: DISCONTINUED | OUTPATIENT
Start: 2022-10-19 | End: 2022-10-19

## 2022-10-19 RX ORDER — MAGNESIUM SULFATE 500 MG/ML
1 VIAL (ML) INJECTION ONCE
Refills: 0 | Status: COMPLETED | OUTPATIENT
Start: 2022-10-19 | End: 2022-10-19

## 2022-10-19 RX ORDER — NIFEDIPINE 30 MG
30 TABLET, EXTENDED RELEASE 24 HR ORAL DAILY
Refills: 0 | Status: DISCONTINUED | OUTPATIENT
Start: 2022-10-19 | End: 2022-10-20

## 2022-10-19 RX ORDER — LABETALOL HCL 100 MG
10 TABLET ORAL ONCE
Refills: 0 | Status: COMPLETED | OUTPATIENT
Start: 2022-10-19 | End: 2022-10-19

## 2022-10-19 RX ORDER — POTASSIUM CHLORIDE 20 MEQ
20 PACKET (EA) ORAL ONCE
Refills: 0 | Status: COMPLETED | OUTPATIENT
Start: 2022-10-19 | End: 2022-10-19

## 2022-10-19 RX ADMIN — Medication 25 MILLIGRAM(S): at 06:05

## 2022-10-19 RX ADMIN — SODIUM CHLORIDE 75 MILLILITER(S): 9 INJECTION INTRAMUSCULAR; INTRAVENOUS; SUBCUTANEOUS at 09:30

## 2022-10-19 RX ADMIN — SODIUM CHLORIDE 2 GRAM(S): 9 INJECTION INTRAMUSCULAR; INTRAVENOUS; SUBCUTANEOUS at 06:06

## 2022-10-19 RX ADMIN — LIDOCAINE 1 PATCH: 4 CREAM TOPICAL at 08:13

## 2022-10-19 RX ADMIN — Medication 650 MILLIGRAM(S): at 11:25

## 2022-10-19 RX ADMIN — DULOXETINE HYDROCHLORIDE 30 MILLIGRAM(S): 30 CAPSULE, DELAYED RELEASE ORAL at 11:25

## 2022-10-19 RX ADMIN — PANTOPRAZOLE SODIUM 40 MILLIGRAM(S): 20 TABLET, DELAYED RELEASE ORAL at 06:05

## 2022-10-19 RX ADMIN — HEPARIN SODIUM 5000 UNIT(S): 5000 INJECTION INTRAVENOUS; SUBCUTANEOUS at 21:08

## 2022-10-19 RX ADMIN — Medication 650 MILLIGRAM(S): at 06:06

## 2022-10-19 RX ADMIN — Medication 25 MILLIGRAM(S): at 17:16

## 2022-10-19 RX ADMIN — SODIUM CHLORIDE 3 GRAM(S): 9 INJECTION INTRAMUSCULAR; INTRAVENOUS; SUBCUTANEOUS at 21:08

## 2022-10-19 RX ADMIN — Medication 30 MILLIGRAM(S): at 21:08

## 2022-10-19 RX ADMIN — ATORVASTATIN CALCIUM 10 MILLIGRAM(S): 80 TABLET, FILM COATED ORAL at 21:08

## 2022-10-19 RX ADMIN — LOSARTAN POTASSIUM 100 MILLIGRAM(S): 100 TABLET, FILM COATED ORAL at 06:06

## 2022-10-19 RX ADMIN — Medication 650 MILLIGRAM(S): at 17:16

## 2022-10-19 RX ADMIN — HEPARIN SODIUM 5000 UNIT(S): 5000 INJECTION INTRAVENOUS; SUBCUTANEOUS at 14:13

## 2022-10-19 RX ADMIN — Medication 25 GRAM(S): at 01:48

## 2022-10-19 RX ADMIN — Medication 50 MICROGRAM(S): at 06:05

## 2022-10-19 RX ADMIN — HEPARIN SODIUM 5000 UNIT(S): 5000 INJECTION INTRAVENOUS; SUBCUTANEOUS at 06:06

## 2022-10-19 RX ADMIN — Medication 20 MILLIEQUIVALENT(S): at 02:30

## 2022-10-19 RX ADMIN — Medication 650 MILLIGRAM(S): at 12:09

## 2022-10-19 RX ADMIN — Medication 12.5 MILLIGRAM(S): at 06:05

## 2022-10-19 RX ADMIN — Medication 10 MILLIGRAM(S): at 04:54

## 2022-10-19 RX ADMIN — SODIUM CHLORIDE 3 GRAM(S): 9 INJECTION INTRAMUSCULAR; INTRAVENOUS; SUBCUTANEOUS at 14:13

## 2022-10-19 RX ADMIN — SODIUM CHLORIDE 75 MILLILITER(S): 9 INJECTION INTRAMUSCULAR; INTRAVENOUS; SUBCUTANEOUS at 10:05

## 2022-10-19 NOTE — CONSULT NOTE ADULT - ASSESSMENT
87y Female with PMH of HTN. HLD, Hypothyroid disease, Afib (on warfarin- last dose last night), mild myelodysplastic syndrome (on Vizada Q 28 days- last treatment 1 week ago), Trigeminal Neuralgia ( on Trileptal, prior gamma knife procedure), B/L knee Osteoarthritis- walker device dependent was brought in by EMS after an unwitnessed fall at home. Imaging shows small acute SAH within the right quadrigeminal plate cistern and right 7-10 rib fxs. Nephro eval for hyponatremia.    Assessment and plan  Hyponatremia/ SAH/ Rib fractures  Hyponatremia likely secondary to HCTZ and trileptal, possible SIADH component   Urine studies noted  Continue to hold HCTZ and oxcarbazepine   c/w NS at 75 cc/hr  Fluid restriction   Check TSH and uric acid  d/c salt tablets  continue to monitor BMP  will follow  87y Female with PMH of HTN. HLD, Hypothyroid disease, Afib (on warfarin- last dose last night), mild myelodysplastic syndrome (on Vizada Q 28 days- last treatment 1 week ago), Trigeminal Neuralgia ( on Trileptal, prior gamma knife procedure), B/L knee Osteoarthritis- walker device dependent was brought in by EMS after an unwitnessed fall at home. Imaging shows small acute SAH within the right quadrigeminal plate cistern and right 7-10 rib fxs. Nephro eval for hyponatremia.    Assessment and plan  Hyponatremia/ SAH/ Rib fractures  Hyponatremia likely secondary to HCTZ and trileptal, possible SIADH component   Urine studies noted  Continue to hold HCTZ and oxcarbazepine   c/w NS at 75 cc/hr  Fluid restriction   Check TSH and uric acid  salt tablets  continue to monitor BMP  will follow

## 2022-10-19 NOTE — CONSULT NOTE ADULT - ATTENDING COMMENTS
WE, NI TEAM, CONSULTED FOR EVALUATION OF POSSIBLE SAH FOUND IN THE CTH.    PT IS A NICE GENTLE LADY ON OAC FOR AFIB WHO REPORTS SHE DEVELOPED SUDDEN WEAKNESS OF THE RIGHT LEG WHICH CAUSED HER TO FALL, BUT NOT LOOSING CONSCIOUSNESS AND NOT HITTING HER HEAD. SHE REMEMBERS AND EXPLAINING TO US ALL THE EVENT (BEFORE, DURING AND AFTER THE FALL). SHE DENIES ANY MENINGEAL SIGN AND HER NECK WAS SUPPLE TO MY EXAM. I REVIEWED THE IMAGES AND EXAMINED THE PATIENT WITH TEJINDER, WHO BRIEFED OUR SUGGESTIONS ABOVE. SHE NEEDS TO BE EVALUATED BY GENERAL NEUROLOGY OR STROKE TEAM FOR SUDDEN RIGHT LEG WEAKNESS. ON OUR EXAM HER NIHSS WAS 1 FOR QUESTIONABLE LT FACIAL WEAKNESS.
Euvolemic hyponatremia - high urine osm c/w high ADH state, aggravated by thiazide diuretic  - cont holding thiazides  - Na level not improving on NS;  d/c iv hydration  - cont NaCl tabs  - limit fluid intake to 1 liter daily  - check thyroid function  - monitor Na level q12h
Recommendations as above.
Critical Care: [id number]   This patient has a high probability of sudden, clinically significant deterioration, which requires the highest level of physician preparedness to intervene urgently. I managed/supervised life or organ supporting interventions that required frequent physician assessment. I devoted my full attention in the ICU to the direct care of this patient for the period of time indicated below. Time I spent with the family or surrogate(s) is included only if the patient was incapable of providing the necessary information or participating in medical decision making. Time devoted to teaching and to any procedures I billed separately is not included.     JANA ESCOBEDO 87y Female admitted to [x ] SICU /[ ] SDU with SAH and multiple ribs fractures, severe hyponatremia, coagulopathy due to Coumadin, high risk for hemodynamic instability and neurological changes.   Patient is examined and evaluated at the bedside with SICU team. Treatment plan discussed with SICU team, nurses and primary team.   Chest X-ray and all relevant studies reviewed during rounds.  Will continue hemodynamic monitoring as per protocol in SICU.    Neuro:  GCS [15 ]   [x ]  Neurovascular checks as per SICU protocol                 [x ] 3% NaCl@50ml/hr                     Paralysis [ ] Yes  [x ]  No      Sedated/Pain control with                 [ ] Dilaudid drip, [ ]  Ketamine drip, [ ] Fentanyl drip, [ ] Propofol, [ ] Precedex, [ ] Versed drip, [ ] Ativan drip,                           [ ] OxyContin standing,  [ ] OxyContin PRN, [ ] Dilaudid PRN pushes, [ ] Fentanyl PRN pushes, [ ] PCA,                [x ] Tylenol IV/PO, [x ] Gabapentin, [ ]  Ketorolac, [ ] Tramadol,  [ ] Lidoderm Patch       Other Medications               [ ] Seroquel, [ ] Zyprexa, [ ] Haldol,  [ ] Clonazepam [ ] Xanax, [ ] Versed/Ativan PRN, [ ] Valium [x ] None               [ ] Robaxin   [ ] Baclofen  [ ] Flexeril               [x ] Keppra  [ ] Lamictal  [ ] Depakote  [ ] Dilantin               [ ] CIWA (Ativan/Valium/ Librium)  CV: continue to monitor  On pressors [ ]  Yes  [x ]  No          [ ]  Levophed, [ ] Martinez-Synephrine, [ ] Vasopressin, [ ]  Epinephrine          [ ] Dobutamine, [ ] Milrinone, [ ]  Midodrine,  [ ] Others    Other Cardiac Meds          [ ] Amiodarone IV/PO, [ ] Digoxin, [ ] Cardizem drip, [ ] Cleviprex drip, [ ] Esmolol drip  Respiratory: Acute respiratory insufficiency due  injuries-> continue monitoring                        None Invasive Support  [x ] Incentive Spirometer                                  [ ] BiPAP   [ ] CPAP/NIV   [ ] HFNC   [ ] NR Face Mask   [x ] NC  [ ] Trach Caller                        Ventilatory support  [ ] Yes [x ] No                            [ ] SBT                                  [ ] PC    [ ] VC   [ ] AC/PRVC   [ ] BiVent/APRV   [ ]CPAP   GI  [x ]  bowel regiment  [x ] BM none [ x] Flatus +             [ ] Ostomy   [ ] NG tube        Prophylaxis [x ] PPI  [ ] H2 Blockers  [ ] Others  Nutrition: continue   [x ] Diet NPO  [ ] TPN/PPN   [ ] calories count   [ ]  Tube Feeds     Renal: Continue I&Os monitoring, Mercedes catheter  [x ] Yes,  [ ]   No ,  [ ] Consideration for discontinuation [ ] Taxes cath/PrimaFit  [ ] TOV  [ ] HD/CVVH       Lytes/Acid-base: replete hypokalemia, hypomagnesemia, hypocalcemia, hypophosphatemia        IV Fluids   [ ] LR,  [ ] NS  [ ] D5W1/2NS [ ] Bicarbonate Drip  [ ] Albumin [ ] Lasix  ID: leukocytosis -> continue to monitor:         IV Abx [ ] Yes, [x ] No;  ID consulted [ ] Yes, [ x] No        Cultures send  [ ] Respiratory   [ ] Blood   [ ] Urine  [ ] Fluids  [ ] Tissue  [ x]  None  Lines:   [ ] Right   [ ] Left  [ ] Bilateral                     [ ] Subclavian TLC        [ ]  Internal Jugular TLC     [ ]  Femoral TLC                     [ ] Subclavian Cordis    [ ] Internal Jugular Cordis   [ ] Femoral Cordis                     [ ] HD catheter    [ ] PICC     [ ]  Midline   [x ] Peripheral IVs                                                 [ ] Right   [ ] Left   [ x] None                     [ ] Radial A-Line    [ ] Femoral A-Line   [ ] Axillary A-Line               Heme: continue to evaluate for acute blood loss anemia- trend Hg/Hct                     AC Reversal Indicated [x ] Yes  [ ] No                    Transfused  indicated  [ ] Yes, [ x] No    [ ] PRBCs   [ ] Platelets   [ ] FFPs   [ ] Cryoprecipitate                    Should be started on or continued with  following  [ ] Yes,  [x ] No                               [ ] Lovenox  [ ] Coumadin  [ ] Heparin drip  [ ] NOVACs  [ ] ASA  [ ] Antiplatelets   Endocrine: Prevent and treat hyperglycemia with insulin as needed,                                 Insuline drip [ ] Yes, [x ] No   PV: follow pulse exam  Skin: decub precautions  DVT Prophylaxis:  [x ] SCDs  [ ] Heparin SQ  [ ] Lovenox  SQ  Stress Gastritis Prophylaxis: PPI/H2 Blockers if indicated  Mobility: patient is evaluated at the bedside with mobility team and the goals for today are discussed with PT [x ] bed rest for now    PATIENT/FAMILY/SURROGATE CONFERENCE:  [x ] Yes with patient at the bedside. [ ] No  PURPOSE: To obtain necessary information, To discuss treatment options under consideration today.    I saw and evaluated the patient personally. I have reviewed and agree with note above. Treatment plan discussed with SICU team, nurses and primary team at the time of the multidisciplinary rounds. The above note is NOT written at the time of rounds and will reflect all changes throughout management of the patient for the day note is written for.    Mitzi Odonnell MD, FACS  Trauma/ACS/SCC Attending

## 2022-10-19 NOTE — PROGRESS NOTE ADULT - SUBJECTIVE AND OBJECTIVE BOX
Neuroendovascular Progress Note:     HPI:  Patient is an 87-year-old female with a past medical history of MDS, HTN, HLD, AFib on Coumadin, hypothyroidism, bilateral LE neuropathy, trigeminal neuralgia, bilateral OA of knees, presented s/p fall at home. Patient states she was standing and felt her knees give out, after which she fell to the floor and awoke in a laying position. Patient does not remember hitting her head but states that her son told her she did. Patient lives with a home health aide and family members. Patient denies headache, dizziness, changes in vision, palpitations, nausea/vomiting prior to fall. CTH on arrival reported small acute SAH within the right quadrigeminal plate cistern. CTA reported a possible SAH vs. venous plexus. The neuroendovascular team was consulted for evaluation of acute SAH and for any possible vascular malformations to explain the nature of this bleed.    Past Medical History:  Atrial fibrillation  Hypertension  Hypothyroid  Anxiety    24-Hour Events: None    Medication:  acetaminophen     Tablet .. 650 milliGRAM(s) Oral every 6 hours  atorvastatin 10 milliGRAM(s) Oral at bedtime  DULoxetine 30 milliGRAM(s) Oral daily  heparin   Injectable 5000 Unit(s) SubCutaneous every 8 hours  levothyroxine 50 MICROGram(s) Oral daily  lidocaine   4% Patch 1 Patch Transdermal every 24 hours  losartan 100 milliGRAM(s) Oral daily  metoprolol tartrate 25 milliGRAM(s) Oral every 12 hours  pantoprazole    Tablet 40 milliGRAM(s) Oral before breakfast  sodium chloride 3 Gram(s) Oral every 8 hours  sodium chloride 0.9%. 1000 milliLiter(s) IV Continuous <Continuous>    Allergies:  Tegretol (Hives; Rash)    Recent Vitals:  T(F): 96.4 (10-19-22 @ 16:00), Max: 98 (10-18-22 @ 20:00)  HR: 90 (10-19-22 @ 16:00) (82 - 107)  BP: 153/87 (10-19-22 @ 16:00) (137/65 - 192/118)  RR: 27 (10-19-22 @ 16:00) (16 - 36)  SpO2: 99% (10-19-22 @ 16:00) (97% - 100%)    COVID-19 PCR: NotDetec (15 Oct 2022 10:42)    Recent Labs:                        10.2   2.04  )-----------( 94       ( 18 Oct 2022 23:43 )             29.6     10-19    124<L>  |  89<L>  |  9<L>  ----------------------------<  93  4.0   |  27  |  <0.5<L>    Ca    8.2<L>      19 Oct 2022 11:18  Phos  3.5     10-18  Mg     1.6     10-18      PT/INR - ( 17 Oct 2022 22:37 )   PT: 20.00 sec;   INR: 1.73 ratio      Exam:  General - NAD   Neuro - AAOx3, EOMI, PERRL, visual fields intact, +mild left facial asymmetry (patient reports this is baseline), moving all extremities to antigravity without drift, sensation intact to light touch throughout, +decreased sensation bilateral LE below knee (at baseline), no extinction / neglect, nonaphasic, nondysarthric, no dysmetria on finger to nose.    Radiology:   ACC: [id number] EXAM: MR BRAIN WAW IC  ACC: [id number] EXAM: MR VENOGRAM BRAIN [facility]  ACC: [id number] EXAM: MR ANGIO BRAIN    PROCEDURE DATE: 10/18/2022    IMPRESSION:  BRAIN  No acute intracranial pathology or abnormal enhancement.    Previously described trace subarachnoid hemorrhage in the right quadrigeminal plate cistern likely represents vessel calcification.    Moderate chronic microvascular type changes.    BRAIN MRA  No evidence of flow-limiting stenosis, aneurysm or vessel malformation.    BRAIN MRV:  No dural venous sinus thrombosis or stenosis    Assessment:   87-year-old female with a PMHx of HTN, HLD, Afib on Coumadin, hypothyroidism, TGN, and bilateral LE neuropathy, who presented s/p fall at home. The patient reports to have developed sudden right leg weakness, without LOC or HT. On exam, the patient exhibits good recall of events leading up to fall/ after fall, and denies meningeal symptoms. All relevant imaging and clinical history were reviewed by Dr. Menjivar and an MRI w/ wout contrast, MRA, and MRV w/ wout contrast were requested. It is reported that the previously observed SAH likely represents vessel calcification, and on other acute intracranial pathology was identified.    Suggestions:  - No acute neuroendovascular intervention is warranted at this time.   - Please reconsult the neuroendovascular team with additional questions/ concerns on this admission. Will sign out.  - Management per primary team.   [phone]

## 2022-10-19 NOTE — CONSULT NOTE ADULT - SUBJECTIVE AND OBJECTIVE BOX
87y Female with PMH of HTN. HLD, Hypothyroid disease, Afib (on warfarin), mild myelodysplastic syndrome (on Vizada Q 28 days- last treatment 1 week ago), Trigeminal Neuralgia ( on Trileptal, prior gamma knife procedure), B/L knee Osteoarthritis- walker device dependent was brought in by EMS after an unwitnessed fall at home. Pt reports walking to the kitchen using her walker and felt her knee buckle.  She reports falling on her back, denies hitting her head. However, family reports pt was mildly confused that lasted seconds.  Pt arrived with GCS 15, no obvious signs of trauma. Primary survey was intact. Imaging showed a small acute SAH within the right quadrigeminal plate cistern.  CT C-spine negative for acute findings, but shows multilevel degenerative changes., and CT chest shows right minimally displaced acute 7-10 rib fx.   Pt was evaluated by NSGY team who recommends repeat  CT Head and Neck,  seizure ppx, and Q1h neuro checks.    ICU Course:   In the ICU repeat imaging Head CTA Head and Neck: hyperdensity in quadrigeminal plate favors small SAH      more than venous plexus. Neuroendovascular recommends MRI, MRA, MRV.  ICU course complicated by hyponatremia which has resolved s/p hypertonic saline gtt. Currently on salt tabs. D/Kwesi home trilleptal for trigeminal neuralgia 2/2 potential for worsening hyponatremia as per neuro and pharmacy recommendations.       Medicine consulted for co-managment    Pt seen and examined at bedside. No CP or SOB.          PAST MEDICAL & SURGICAL HISTORY:  Atrial fibrillation    Hypertension    Hypothyroid    Anxiety    H/O colonoscopy  10 yrs ago    History of cholecystectomy        VITAL SIGNS (Last 24 hrs):  T(C): 35.8 (10-19-22 @ 07:48), Max: 36.7 (10-18-22 @ 20:00)  HR: 87 (10-19-22 @ 08:00) (82 - 107)  BP: 151/67 (10-19-22 @ 08:00) (138/73 - 192/118)  RR: 19 (10-19-22 @ 08:00) (16 - 36)  SpO2: 100% (10-19-22 @ 08:00) (97% - 100%)  Wt(kg): --  Daily     Daily     I&O's Summary    18 Oct 2022 07:01  -  19 Oct 2022 07:00  --------------------------------------------------------  IN: 650 mL / OUT: 1700 mL / NET: -1050 mL    19 Oct 2022 07:01  -  19 Oct 2022 11:19  --------------------------------------------------------  IN: 315 mL / OUT: 150 mL / NET: 165 mL        PHYSICAL EXAM:  GENERAL: NAD, elderly   HEAD:  Atraumatic, Normocephalic  EYES: EOMI, PERRLA, conjunctiva and sclera clear  NECK: Supple, No JVD  CHEST/LUNG: Clear to auscultation bilaterally; No wheeze  HEART: Regular rate and rhythm; No murmurs, rubs, or gallops  ABDOMEN: Soft, Nontender, Nondistended; Bowel sounds present  EXTREMITIES:  2+ Peripheral Pulses, No clubbing, cyanosis, or edema  PSYCH: AAOx3  NEUROLOGY: non-focal  SKIN: No rashes or lesions    Labs Reviewed  Spoke to patient in regards to abnormal labs.    CBC Full  -  ( 18 Oct 2022 23:43 )  WBC Count : 2.04 K/uL  Hemoglobin : 10.2 g/dL  Hematocrit : 29.6 %  Platelet Count - Automated : 94 K/uL  Mean Cell Volume : 97.0 fL  Mean Cell Hemoglobin : 33.4 pg  Mean Cell Hemoglobin Concentration : 34.5 g/dL  Auto Neutrophil # : x  Auto Lymphocyte # : x  Auto Monocyte # : x  Auto Eosinophil # : x  Auto Basophil # : x  Auto Neutrophil % : x  Auto Lymphocyte % : x  Auto Monocyte % : x  Auto Eosinophil % : x  Auto Basophil % : x    BMP:    10-19 @ 05:34    Blood Urea Nitrogen - 8  Calcium - 8.3  Carbond Dioxide - 28  Chloride - 90  Creatinine - 0.5  Glucose - 97  Potassium - 4.1  Sodium - 126      Hemoglobin A1c -   PT/INR - ( 17 Oct 2022 22:37 )   PT: 20.00 sec;   INR: 1.73 ratio         PTT - ( 16 Oct 2022 22:26 )  PTT:36.9 sec  Urine Culture:        COVID Labs  CRP:      D-Dimer:            Imaging reviewed independently and reviewed read  < from: Xray Chest 1 View- PORTABLE-Routine (Xray Chest 1 View- PORTABLE-Routine in AM.) (10.17.22 @ 06:04) >  IMPRESSION:    No focal consolidation, pleural effusion, or pneumothorax.    < end of copied text >        MEDICATIONS  (STANDING):  acetaminophen     Tablet .. 650 milliGRAM(s) Oral every 6 hours  atorvastatin 10 milliGRAM(s) Oral at bedtime  DULoxetine 30 milliGRAM(s) Oral daily  heparin   Injectable 5000 Unit(s) SubCutaneous every 8 hours  levothyroxine 50 MICROGram(s) Oral daily  lidocaine   4% Patch 1 Patch Transdermal every 24 hours  losartan 100 milliGRAM(s) Oral daily  metoprolol tartrate 25 milliGRAM(s) Oral every 12 hours  pantoprazole    Tablet 40 milliGRAM(s) Oral before breakfast  sodium chloride 3 Gram(s) Oral every 8 hours  sodium chloride 0.9%. 1000 milliLiter(s) (75 mL/Hr) IV Continuous <Continuous>    MEDICATIONS  (PRN):

## 2022-10-19 NOTE — PROGRESS NOTE ADULT - ASSESSMENT
Assessment & Plan    87y Female with PMH of HTN. HLD, Hypothyroid disease, Afib (on warfarin- last dose last night), mild myelodysplastic syndrome (on Vizada Q 28 days- last treatment 1 week ago), Trigeminal Neuralgia ( on Trileptal, prior gamma knife procedure), B/L knee Osteoarthritis- walker device dependent was brought in by EMS after an unwitnessed fall at home. Imaging shows small acute SAH within the right quadrigeminal plate cistern and right 7-10 rib fxs.       NEURO:   # Acute SAH  - Rpt Head CTA Head and Neck: hyperdensity in quadrigeminal plate favors small SAH      more than venous plexus      mild atheroslerotic stenosis in B/L cervical ICA's and B/L carotid siphons- mild          atherosclerotic stenosis in B/L V4 vertebral arteries.  - NSGY- no interventions, no further imaging needed unless exam changes        Obtain Neuro IR consult- pending,         Q4h neuro checks        can start HSQ, can resume Coumadin in 1 week (10/22/22)        10/17 Rec MRI w and w/o, MRA, MRV   - 10/17 hold home Trileptal (can cause hyponatremia, pt on for trigeminal neuralgia), no need for seizure ppx as per Neuro    # PMH Trigeminal Neuralgia  - 10/17 holding Trileptal 2/2 hyponatremia    # PMH Neuropathy  - con't Cymbalta    #Pain-controlled     with Tylenol and Lidocaine patch  - 10/17 d/c gabapentin as per neuro    RESP:   # right7-10 rib fxs   - 10/17 d/c daily CXR  - incentive spirometry- pulling 750   - pain control    CARDS:   # A-fib, rate controlled  - con't Metoprolol 25 Q12h  -  home coumadin- held- can resume in 1 week as per NSGY, 10/22  - INR 2.1 and 2.4- Coumadin reversed with Kcentra due to intra-cranial bleed  - INR trend: 2.1--> 2.4-->1.48 > 1.73  - Daily EKG    # PMH of HTN  - con't home regimen, Metoprolol and Losartan/HCTZ  - HCTZ held due to hyponatremia    # PMH HLD  - con't Lipitor    Trop negative x 3      GI/NUTR:   No acute issues  - Regular Diet  - 10/17 Free water restriction 1L    /RENAL:   # Hyponatremia- improving  - NaCl PO 2G q8    Labs:          BUN/Cr- 8/<0.5  -->,  10/0.6  -->          Electrolytes-Na 127 // K 3.8 // Mg 1.6 //  Phos 3.5    HEME/ONC:   # PMH of Myelodysplastic syndrome  - receives Vizada Q28 days, last treatment 1 week ago  [ ] Consulted Dr Chavez - pending recommendations      # Leukopenia- WBC 2.0 , improving - 2.04  - likely secondary to MDS treatment  - Hematology consult appreciated- nothing to do  - con't to trend      Labs: Hb/Hct:  10.2/29          Plts:  94              INR:  2.1--> 2.4--> Kcentra--> 1.48 > 1.73    ID:   WBC- 1.96 --> 2.04  afebrile  -No signs of infection  -UA- with + bacteria, no leuk, no nitrites- Ceftriaxone x 1 given in the ED  -Repeat UA +lg blood     ENDO:  # PMH Hypothyroidism  rcon't Synthroid 50 mcg QD    # Glucose monitoring  - Q6h FS     DVT ppx:   HSQ Q8h  SCDs to lower ext      GI ppx:  Protonix    LINES/DRAINS: PIV,     DISPO: SURG   Assessment & Plan    87y Female with PMH of HTN. HLD, Hypothyroid disease, Afib (on warfarin- last dose last night), mild myelodysplastic syndrome (on Vizada Q 28 days- last treatment 1 week ago), Trigeminal Neuralgia ( on Trileptal, prior gamma knife procedure), B/L knee Osteoarthritis- walker device dependent was brought in by EMS after an unwitnessed fall at home. Imaging shows small acute SAH within the right quadrigeminal plate cistern and right 7-10 rib fxs.       NEURO:   # Acute SAH  - Rpt Head CTA Head and Neck: hyperdensity in quadrigeminal plate favors small SAH more than venous plexus mild atheroslerotic stenosis in B/L cervical ICA's and B/L carotid siphons- mild atherosclerotic stenosis in B/L V4 vertebral arteries.  - NSGY- no interventions, no further imaging needed unless exam changes        Obtain Neuro IR consult- Rec MRI w and w/o, MRA, MRV        Q4h neuro checks        can start HSQ, can resume Coumadin in 1 week (10/22/22)  - 10/17 hold home Trileptal (can cause hyponatremia, pt on for trigeminal neuralgia), no need for seizure ppx as per Neuro    # PMH Trigeminal Neuralgia  - 10/17 holding Trileptal 2/2 hyponatremia    # PMH Neuropathy  - con't Cymbalta    #Pain-controlled     with Tylenol and Lidocaine patch  - 10/17 d/c gabapentin as per neuro    RESP:   # right7-10 rib fxs   - 10/17 d/c daily CXR  - incentive spirometry- pulling 750   - pain control    CARDS:   # A-fib, rate controlled  - con't Metoprolol 25 Q12h  -  home coumadin- held- can resume in 1 week as per NSGY, 10/22  - INR 2.1 and 2.4- Coumadin reversed with Kcentra due to intra-cranial bleed  - INR trend: 2.1--> 2.4-->1.48 > 1.73  - Daily EKG    # PMH of HTN  - con't home regimen, Metoprolol and Losartan  - HCTZ held due to hyponatremia    # PMH HLD  - con't Lipitor    Trop negative x 3      GI/NUTR:   No acute issues  - Regular Diet  - 10/17 Free water restriction 1L  - GI PPx: PPI    /RENAL:   # Hyponatremia- improving  - NaCl PO 2G q8 --> increase to 3g    Labs:          BUN/Cr- 8/<0.5  -->,  10/0.6  -->          Electrolytes-Na 127 // K 3.8 // Mg 1.6 //  Phos 3.5    HEME/ONC:   # PMH of Myelodysplastic syndrome  - receives Vizada Q28 days, last treatment 1 week ago  [ ] Consulted Dr Chavez - pending recommendations      # Leukopenia- WBC 2.0 , improving - 2.04  - likely secondary to MDS treatment  - Hematology consult appreciated- nothing to do  - con't to trend      Labs: Hb/Hct:  10.2/29          Plts:  94              INR:  2.1--> 2.4--> Kcentra--> 1.48 > 1.73    ID:   WBC- 1.96 --> 2.04  afebrile  -No signs of infection  -UA- with + bacteria, no leuk, no nitrites- Ceftriaxone x 1 given in the ED  -Repeat UA +lg blood     ENDO:  # PMH Hypothyroidism  rcon't Synthroid 50 mcg QD    # Glucose monitoring  - Q6h FS     DVT ppx:   HSQ Q8h  SCDs to lower ext      GI ppx:  Protonix    LINES/DRAINS: PIV,     DISPO: SURG

## 2022-10-19 NOTE — PROGRESS NOTE ADULT - ATTENDING COMMENTS
doing well. vitals stable. na level (hyponatremia) improving. [facility] neurosurgery recs. good urine output, cr stable.
Critical Care: [id number]   This patient has a high probability of sudden, clinically significant deterioration, which requires the highest level of physician preparedness to intervene urgently. I managed/supervised life or organ supporting interventions that required frequent physician assessment. I devoted my full attention in the ICU to the direct care of this patient for the period of time indicated below. Time I spent with the family or surrogate(s) is included only if the patient was incapable of providing the necessary information or participating in medical decision making. Time devoted to teaching and to any procedures I billed separately is not included.     JANA ESCOBEDO 87y Female admitted to [x ] SICU /[ ] SDU with SAH and multiple ribs fractures, severe hyponatremia, coagulopathy due to Coumadin, high risk for hemodynamic instability and neurological changes.   Patient is examined and evaluated at the bedside with SICU team. Treatment plan discussed with SICU team, nurses and primary team.   Chest X-ray and all relevant studies reviewed during rounds.  Will continue hemodynamic monitoring as per protocol in SICU.    Neuro:  GCS [15 ]   [x ]  Neurovascular checks as per SICU protocol                 [x ] 3% NaCl@40ml/hr     Na improving                 Paralysis [ ] Yes  [x ]  No      Sedated/Pain control with                 [ ] Dilaudid drip, [ ]  Ketamine drip, [ ] Fentanyl drip, [ ] Propofol, [ ] Precedex, [ ] Versed drip, [ ] Ativan drip,                           [ ] OxyContin standing,  [ ] OxyContin PRN, [ ] Dilaudid PRN pushes, [ ] Fentanyl PRN pushes, [ ] PCA,                [x ] Tylenol IV/PO, [x ] Gabapentin, [ ]  Ketorolac, [ ] Tramadol,  [ ] Lidoderm Patch       Other Medications               [ ] Seroquel, [ ] Zyprexa, [ ] Haldol,  [ ] Clonazepam [ ] Xanax, [ ] Versed/Ativan PRN, [ ] Valium [x ] None               [ ] Robaxin   [ ] Baclofen  [ ] Flexeril               [x ] Keppra  [ ] Lamictal  [ ] Depakote  [ ] Dilantin               [ ] CIWA (Ativan/Valium/ Librium)  CV: continue to monitor  On pressors [ ]  Yes  [x ]  No          [ ]  Levophed, [ ] Martinez-Synephrine, [ ] Vasopressin, [ ]  Epinephrine          [ ] Dobutamine, [ ] Milrinone, [ ]  Midodrine,  [ ] Others    Other Cardiac Meds          [ ] Amiodarone IV/PO, [ ] Digoxin, [ ] Cardizem drip, [ ] Cleviprex drip, [ ] Esmolol drip  Respiratory: Acute respiratory insufficiency due  injuries-> continue monitoring                        None Invasive Support  [x ] Incentive Spirometer                                  [ ] BiPAP   [ ] CPAP/NIV   [ ] HFNC   [ ] NR Face Mask   [x ] NC  [ ] Trach Caller                        Ventilatory support  [ ] Yes [x ] No                            [ ] SBT                                  [ ] PC    [ ] VC   [ ] AC/PRVC   [ ] BiVent/APRV   [ ]CPAP   GI  [x ]  bowel regiment  [x ] BM none [ x] Flatus +             [ ] Ostomy   [ ] NG tube        Prophylaxis [x ] PPI  [ ] H2 Blockers  [ ] Others  Nutrition: continue   [x ] Diet start regular diet  [ ] TPN/PPN   [ ] calories count   [ ]  Tube Feeds     Renal: Continue I&Os monitoring, Mercedes catheter  [x ] Yes,  [ ]   No ,  [ ] Consideration for discontinuation [ ] Taxes cath/PrimaFit  [ ] TOV  [ ] HD/CVVH       Lytes/Acid-base: replete hypokalemia, hypomagnesemia, hypocalcemia, hypophosphatemia        IV Fluids   [ ] LR,  [ ] NS  [ ] D5W1/2NS [ ] Bicarbonate Drip  [ ] Albumin [ ] Lasix  ID: leukocytosis -> continue to monitor:         IV Abx [ ] Yes, [x ] No;  ID consulted [ ] Yes, [ x] No        Cultures send  [ ] Respiratory   [ ] Blood   [ ] Urine  [ ] Fluids  [ ] Tissue  [ x]  None  Lines:   [ ] Right   [ ] Left  [ ] Bilateral                     [ ] Subclavian TLC        [ ]  Internal Jugular TLC     [ ]  Femoral TLC                     [ ] Subclavian Cordis    [ ] Internal Jugular Cordis   [ ] Femoral Cordis                     [ ] HD catheter    [ ] PICC     [ ]  Midline   [x ] Peripheral IVs                                                 [ ] Right   [ ] Left   [ x] None                     [ ] Radial A-Line    [ ] Femoral A-Line   [ ] Axillary A-Line               Heme: continue to evaluate for acute blood loss anemia- trend Hg/Hct                     AC Reversal Indicated [x ] Yes  [ ] No                    Transfused  indicated  [ ] Yes, [ x] No    [ ] PRBCs   [ ] Platelets   [ ] FFPs   [ ] Cryoprecipitate                    Should be started on or continued with  following  [ ] Yes,  [x ] No                               [ ] Lovenox  [ ] Coumadin  [ ] Heparin drip  [ ] NOVACs  [ ] ASA  [ ] Antiplatelets   Endocrine: Prevent and treat hyperglycemia with insulin as needed,                                 Insuline drip [ ] Yes, [x ] No   PV: follow pulse exam  Skin: decub precautions  DVT Prophylaxis:  [x ] SCDs  [ ] Heparin SQ  [ ] Lovenox  SQ  Stress Gastritis Prophylaxis: PPI/H2 Blockers if indicated  Mobility: patient is evaluated at the bedside with mobility team and the goals for today are discussed with PT [x ] bed rest for now    PATIENT/FAMILY/SURROGATE CONFERENCE:  [x ] Yes with patient at the bedside. [ ] No  PURPOSE: To obtain necessary information, To discuss treatment options under consideration today.    I saw and evaluated the patient personally. I have reviewed and agree with note above. Treatment plan discussed with SICU team, nurses and primary team at the time of the multidisciplinary rounds. The above note is NOT written at the time of rounds and will reflect all changes throughout management of the patient for the day note is written for.    Mitzi Odonnell MD, FACS  Trauma/ACS/SCC Attending
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  Recommend d/c oxcarbazepine since TGN symptoms resolved currently and only start medication if symptomatic.  In meantime, hyperdensity R quadrigeminal cistern likely calcified vessel and NOT SAH.  Recommendations as above.

## 2022-10-19 NOTE — PROGRESS NOTE ADULT - SUBJECTIVE AND OBJECTIVE BOX
GENERAL SURGERY PROGRESS NOTE    Patient: JANA ESCOBEDO , 87y ([birth year])Female   MRN: [id number]  Location: [address]  Visit: 10-15-22 Inpatient  Date: 10-19-22 @ 15:52    Hospital Day #: 5      Events of past 24 hours:   10/18  Day  - MRI pending  - restarted HCTZ 12.5mg daily  - increased salt tabs to 2g q8  - BMP @11 for sodium  - Downgrade    Night  -HR increased to 125 after missing PO dose metoprolol for MRI  -PO dose given> Hr <100  -sodium trend> 127 from 131  -Repeat urine lytes, BMP in AM  -K and Mg repleted  -mri completed    PAST MEDICAL & SURGICAL HISTORY:  Atrial fibrillation      Hypertension      Hypothyroid      Anxiety      H/O colonoscopy  10 yrs ago      History of cholecystectomy          Vitals:   T(F): 96.3 (10-19-22 @ 12:00), Max: 98 (10-18-22 @ 20:00)  HR: 94 (10-19-22 @ 12:00)  BP: 137/65 (10-19-22 @ 12:00)  RR: 20 (10-19-22 @ 12:00)  SpO2: 99% (10-19-22 @ 12:00)      Diet, Regular      Fluids: sodium chloride 0.9%.: Solution, 1000 milliLiter(s) infuse at 75 mL/Hr  sodium chloride:   3 Gram(s), Oral, every 8 hours      I & O's:    10-18-22 @ 07:01  -  10-19-22 @ 07:00  --------------------------------------------------------  IN:    Oral Fluid: 650 mL  Total IN: 650 mL    OUT:    Voided (mL): 1700 mL  Total OUT: 1700 mL    Total NET: -1050 mL        Bowel Movement: : [] YES [] NO  Flatus: : [] YES [] NO    PHYSICAL EXAM:  General: NAD, calm and cooperative.  Cardiac: S1, S2  Respiratory: Normal respiratory effort on RA  Abdomen: Soft, non-distended, non-tender, no rebound, no guarding.   Skin: Warm/dry, normal color, no jaundice.    MEDICATIONS  (STANDING):  acetaminophen     Tablet .. 650 milliGRAM(s) Oral every 6 hours  atorvastatin 10 milliGRAM(s) Oral at bedtime  DULoxetine 30 milliGRAM(s) Oral daily  heparin   Injectable 5000 Unit(s) SubCutaneous every 8 hours  levothyroxine 50 MICROGram(s) Oral daily  lidocaine   4% Patch 1 Patch Transdermal every 24 hours  losartan 100 milliGRAM(s) Oral daily  metoprolol tartrate 25 milliGRAM(s) Oral every 12 hours  pantoprazole    Tablet 40 milliGRAM(s) Oral before breakfast  sodium chloride 3 Gram(s) Oral every 8 hours  sodium chloride 0.9%. 1000 milliLiter(s) (75 mL/Hr) IV Continuous <Continuous>    MEDICATIONS  (PRN):      DVT PROPHYLAXIS: heparin   Injectable 5000 Unit(s) SubCutaneous every 8 hours    GI PROPHYLAXIS: pantoprazole    Tablet 40 milliGRAM(s) Oral before breakfast    ANTICOAGULATION:   ANTIBIOTICS:            LAB/STUDIES:  Labs:  CAPILLARY BLOOD GLUCOSE                              10.2   2.04  )-----------( 94       ( 18 Oct 2022 23:43 )             29.6         10-19    124<L>  |  89<L>  |  9<L>  ----------------------------<  93  4.0   |  27  |  <0.5<L>      Calcium, Total Serum: 8.2 mg/dL (10-19-22 @ 11:18)      LFTs:         Coags:     20.00  ----< 1.73    ( 17 Oct 2022 22:37 )     x                               IMAGING:  < from: MR Venogram Head w/wo IV Cont (10.18.22 @ 19:21) >  BRAIN  No acute intracranial pathology or abnormal enhancement.    Previously described trace subarachnoid hemorrhage in the right   quadrigeminal plate cistern likely represents vessel calcification.    Moderate chronic microvascular type changes.    BRAIN MRA  No evidence of flow-limiting stenosis, aneurysm or vessel malformation.    BRAIN MRV:  No dural venous sinus thrombosis or stenosis.      < end of copied text >

## 2022-10-19 NOTE — PROGRESS NOTE ADULT - ASSESSMENT
Neurology: 87y Female with PMH of HTN. HLD, Hypothyroid disease, Afib (was on Comadine), mild MDS, Trigeminal Neuralgia (on Trileptal, prior gamma knife procedure), B/L LE sensory polyneuropahty (on Cymbalta 30 mg), b/l knee OA, walker dependant, p/w R shoulder/chest pain. Imaging showed ? small acute SAH within the right quadrigeminal plate cistern and right 7-10 rib fxs and admitted to SICU. MRI brain done, official report pending.   Recs: d/c Trileptal for now, MRI head w trigeminal and vascular protocol  Neurology: 87y Female with PMH of HTN. HLD, Hypothyroid disease, Afib (was on Comadine), mild MDS, Trigeminal Neuralgia (on Trileptal, prior gamma knife procedure), B/L LE sensory polyneuropahty (on Cymbalta 30 mg), b/l knee OA, walker dependant, p/w R shoulder/chest pain. Imaging showed ? small acute SAH within the right quadrigeminal plate cistern and right 7-10 rib fxs and admitted to SICU. Due to hyponatremia (Na - 116) Trileptal was d/c 2 d ago, no facial pain episodes so far.  MRI brain done, MRI head w trigeminal and vascular protocol was negative for structural changes.         Recommendations:   1. Keep holding Trileptal  2. Please set up appointment w  Dr. Delaney for OP management her trigeminal neuralgia upon discharge and emphasize to keep her appointment with the MD.    3. Continue with Cymbalta 30 mg/day for polyneuropathy  4. Please re-consult if trigeminal neuralgia/facial pain re-occur.  5. Rest of SAH/trauma workup per neuroendovascular, neurosurgery and trauma.        The case was discussed w Dr. Arndt       Neurology: 87y Female with PMH of HTN. HLD, Hypothyroid disease, Afib (was on Comadine), mild MDS, Trigeminal Neuralgia (on Trileptal, prior gamma knife procedure), B/L LE sensory polyneuropahty (on Cymbalta 30 mg), b/l knee OA, walker dependant, p/w R shoulder/chest pain. Imaging showed ? small acute SAH within the right quadrigeminal plate cistern and right 7-10 rib fxs and admitted to SICU. Due to hyponatremia (Na - 116) Trileptal was d/c 2 d ago, no facial pain episodes so far.  MRI brain done, MRI head w trigeminal and vascular protocol was negative for structural changes.     Recommendations:   1. Keep holding Trileptal  2. Please set up appointment w  Dr. Delaney for OP management her trigeminal neuralgia upon discharge and emphasize to keep her appointment with the MD.    3. Continue with Cymbalta 30 mg/day for polyneuropathy  4. Please re-consult if trigeminal neuralgia/facial pain re-occur.  5. Rest of SAH/trauma workup per neuroendovascular, neurosurgery and trauma.        The case was discussed w Dr. Arndt

## 2022-10-19 NOTE — PROGRESS NOTE ADULT - SUBJECTIVE AND OBJECTIVE BOX
Neurology Progress Note    Interval History:    Patient was seen and examined, no acute event over night. MRI of brain w MRA/MRV was done    Medications:  acetaminophen     Tablet .. 650 milliGRAM(s) Oral every 6 hours  atorvastatin 10 milliGRAM(s) Oral at bedtime  DULoxetine 30 milliGRAM(s) Oral daily  heparin   Injectable 5000 Unit(s) SubCutaneous every 8 hours  levothyroxine 50 MICROGram(s) Oral daily  lidocaine   4% Patch 1 Patch Transdermal every 24 hours  losartan 100 milliGRAM(s) Oral daily  metoprolol tartrate 25 milliGRAM(s) Oral every 12 hours  pantoprazole    Tablet 40 milliGRAM(s) Oral before breakfast  sodium chloride 3 Gram(s) Oral every 8 hours  sodium chloride 0.9%. 1000 milliLiter(s) IV Continuous <Continuous>      Vital Signs Last 24 Hrs  T(C): 35.8 (19 Oct 2022 07:48), Max: 36.7 (18 Oct 2022 20:00)  T(F): 96.5 (19 Oct 2022 07:48), Max: 98 (18 Oct 2022 20:00)  HR: 87 (19 Oct 2022 08:00) (82 - 107)  BP: 151/67 (19 Oct 2022 08:00) (138/73 - 192/118)  BP(mean): 97 (19 Oct 2022 08:00) (97 - 141)  RR: 19 (19 Oct 2022 08:00) (16 - 36)  SpO2: 100% (19 Oct 2022 08:00) (97% - 100%)    Parameters below as of 19 Oct 2022 08:00  Patient On (Oxygen Delivery Method): room air        Neurological Examination:  General:  Appearance is consistent with chronologic age.   Cognitive/Language:  Awake, alert, and oriented to person, place, time and date.  Recent and remote memory intact.  Fund of knowledge is appropriate.  Naming, repetition and comprehension intact. Nondysarthric.    Cranial Nerves  - Eyes: Visual acuity intact, Visual fields full.  EOMI w/o nystagmus, skew or reported double vision.  PERRL.  No ptosis/weakness of eyelid closure.    - Face:  Facial sensation normal V1 - 3, no facial asymmetry.    - Ears/Nose/Throat:  Hearing grossly intact b/l to finger rub.  Palate elevates midline.  Tongue and uvula midline.   Motor examination:  (MRC grade R/L) 5/5 UE; 5/5 LE.  No observable drift. Normal tone and bulk. No tenderness, twitching, tremors or involuntary movements.  Sensory examination:  Intact to light touch and pinprick, pain, temperature and proprioception and vibration in all extremities.  Reflexes:   2+ b/l biceps, triceps, patella and achilles.  Plantar response downgoing b/l.  Jaw jerk, Tatyana, clonus absent.  Cerebellum:   FTN/HKS intact.  No dysmetria.    Gait narrow based and normal.    Labs:  CBC Full  -  ( 18 Oct 2022 23:43 )  WBC Count : 2.04 K/uL  RBC Count : 3.05 M/uL  Hemoglobin : 10.2 g/dL  Hematocrit : 29.6 %  Platelet Count - Automated : 94 K/uL  Mean Cell Volume : 97.0 fL  Mean Cell Hemoglobin : 33.4 pg  Mean Cell Hemoglobin Concentration : 34.5 g/dL  Auto Neutrophil # : x  Auto Lymphocyte # : x  Auto Monocyte # : x  Auto Eosinophil # : x  Auto Basophil # : x  Auto Neutrophil % : x  Auto Lymphocyte % : x  Auto Monocyte % : x  Auto Eosinophil % : x  Auto Basophil % : x    10-19    126<L>  |  90<L>  |  8<L>  ----------------------------<  97  4.1   |  28  |  0.5<L>    Ca    8.3<L>      19 Oct 2022 05:34  Phos  3.5     10-18  Mg     1.6     10-18        PT/INR - ( 17 Oct 2022 22:37 )   PT: 20.00 sec;   INR: 1.73 ratio               RADIOLOGY & ADDITIONAL TESTS:       Neurology Progress Note    Interval History:    Patient was seen and examined, no acute event over night. MRI of brain w MRA/MRV was done    Medications:  acetaminophen     Tablet .. 650 milliGRAM(s) Oral every 6 hours  atorvastatin 10 milliGRAM(s) Oral at bedtime  DULoxetine 30 milliGRAM(s) Oral daily  heparin   Injectable 5000 Unit(s) SubCutaneous every 8 hours  levothyroxine 50 MICROGram(s) Oral daily  lidocaine   4% Patch 1 Patch Transdermal every 24 hours  losartan 100 milliGRAM(s) Oral daily  metoprolol tartrate 25 milliGRAM(s) Oral every 12 hours  pantoprazole    Tablet 40 milliGRAM(s) Oral before breakfast  sodium chloride 3 Gram(s) Oral every 8 hours  sodium chloride 0.9%. 1000 milliLiter(s) IV Continuous <Continuous>      Vital Signs Last 24 Hrs  T(C): 35.8 (19 Oct 2022 07:48), Max: 36.7 (18 Oct 2022 20:00)  T(F): 96.5 (19 Oct 2022 07:48), Max: 98 (18 Oct 2022 20:00)  HR: 87 (19 Oct 2022 08:00) (82 - 107)  BP: 151/67 (19 Oct 2022 08:00) (138/73 - 192/118)  BP(mean): 97 (19 Oct 2022 08:00) (97 - 141)  RR: 19 (19 Oct 2022 08:00) (16 - 36)  SpO2: 100% (19 Oct 2022 08:00) (97% - 100%)    Parameters below as of 19 Oct 2022 08:00  Patient On (Oxygen Delivery Method): room air        Neurological Examination:  GEN: NAD, pleasant, cooperative    NEURO:   MENTAL STATUS: AAOx3  LANG/SPEECH: Fluent, intact naming, repetition & comprehension  CRANIAL NERVES:  II: Pupils equal and reactive, no RAPD, normal visual field on confrontation  III, IV, VI: EOM intact, no gaze preference or deviation, gaze evoked horizontal nys and vertical in looking up  V: normal  VII: flattened L n/l fold  VIII: normal hearing to speech  MOTOR: 5/5 in both upper and lower extremities  REFLEXES: 2/4 throughout, bilateral flexor plantars  SENSORY: Decreased to touch, temperature & pin prick in b/l LE  COORD: Normal finger to nose and heel to shin, no tremor, no dysmetria  Labs:  CBC Full  -  ( 18 Oct 2022 23:43 )  WBC Count : 2.04 K/uL  RBC Count : 3.05 M/uL  Hemoglobin : 10.2 g/dL  Hematocrit : 29.6 %  Platelet Count - Automated : 94 K/uL  Mean Cell Volume : 97.0 fL  Mean Cell Hemoglobin : 33.4 pg  Mean Cell Hemoglobin Concentration : 34.5 g/dL  Auto Neutrophil # : x  Auto Lymphocyte # : x  Auto Monocyte # : x  Auto Eosinophil # : x  Auto Basophil # : x  Auto Neutrophil % : x  Auto Lymphocyte % : x  Auto Monocyte % : x  Auto Eosinophil % : x  Auto Basophil % : x    10-19    126<L>  |  90<L>  |  8<L>  ----------------------------<  97  4.1   |  28  |  0.5<L>    Ca    8.3<L>      19 Oct 2022 05:34  Phos  3.5     10-18  Mg     1.6     10-18        PT/INR - ( 17 Oct 2022 22:37 )   PT: 20.00 sec;   INR: 1.73 ratio               RADIOLOGY & ADDITIONAL TESTS:       Neurology Progress Note    Interval History:    Patient was seen and examined, no acute event over night. MRI of brain w MRA/MRV was done. Denies any recurrent facial pain off oxcarbazepine.  Denies any HA/N/V.  No deficits at this time.  Dizziness improved.      Medications:  acetaminophen     Tablet .. 650 milliGRAM(s) Oral every 6 hours  atorvastatin 10 milliGRAM(s) Oral at bedtime  DULoxetine 30 milliGRAM(s) Oral daily  heparin   Injectable 5000 Unit(s) SubCutaneous every 8 hours  levothyroxine 50 MICROGram(s) Oral daily  lidocaine   4% Patch 1 Patch Transdermal every 24 hours  losartan 100 milliGRAM(s) Oral daily  metoprolol tartrate 25 milliGRAM(s) Oral every 12 hours  pantoprazole    Tablet 40 milliGRAM(s) Oral before breakfast  sodium chloride 3 Gram(s) Oral every 8 hours  sodium chloride 0.9%. 1000 milliLiter(s) IV Continuous <Continuous>      Vital Signs Last 24 Hrs  T(C): 35.8 (19 Oct 2022 07:48), Max: 36.7 (18 Oct 2022 20:00)  T(F): 96.5 (19 Oct 2022 07:48), Max: 98 (18 Oct 2022 20:00)  HR: 87 (19 Oct 2022 08:00) (82 - 107)  BP: 151/67 (19 Oct 2022 08:00) (138/73 - 192/118)  BP(mean): 97 (19 Oct 2022 08:00) (97 - 141)  RR: 19 (19 Oct 2022 08:00) (16 - 36)  SpO2: 100% (19 Oct 2022 08:00) (97% - 100%)    Parameters below as of 19 Oct 2022 08:00  Patient On (Oxygen Delivery Method): room air        Neurological Examination:  GEN: NAD, pleasant, cooperative    NEURO:   MENTAL STATUS: AAOx3  LANG/SPEECH: Fluent, intact naming, repetition & comprehension  CRANIAL NERVES:  II: Pupils equal and reactive, no RAPD, normal visual field on confrontation  III, IV, VI: EOM intact, no gaze preference or deviation, gaze evoked horizontal nys and vertical in looking up  V: normal  VII: flattened L n/l fold  VIII: normal hearing to speech  MOTOR: 5/5 in both upper and lower extremities  REFLEXES: 2/4 throughout, bilateral flexor plantars  SENSORY: Decreased to touch, temperature & pin prick in b/l LE  COORD: Normal finger to nose and heel to shin, no tremor, no dysmetria    Labs:  CBC Full  -  ( 18 Oct 2022 23:43 )  WBC Count : 2.04 K/uL  RBC Count : 3.05 M/uL  Hemoglobin : 10.2 g/dL  Hematocrit : 29.6 %  Platelet Count - Automated : 94 K/uL  Mean Cell Volume : 97.0 fL  Mean Cell Hemoglobin : 33.4 pg  Mean Cell Hemoglobin Concentration : 34.5 g/dL      10-19    126<L>  |  90<L>  |  8<L>  ----------------------------<  97  4.1   |  28  |  0.5<L>    Ca    8.3<L>      19 Oct 2022 05:34  Phos  3.5     10-18  Mg     1.6     10-18    PT/INR - ( 17 Oct 2022 22:37 )   PT: 20.00 sec;   INR: 1.73 ratio      < from: MR Head w/wo IV Cont (10.18.22 @ 19:21) >  IMPRESSION:  BRAIN  No acute intracranial pathology or abnormal enhancement.    Previously described trace subarachnoid hemorrhage in the right   quadrigeminal plate cistern likely represents vessel calcification.    Moderate chronic microvascular type changes.    BRAIN MRA  No evidence of flow-limiting stenosis, aneurysm or vessel malformation.    BRAIN MRV:  No dural venous sinus thrombosis or stenosis.    --- End of Report ---    SEB GAO MD; Attending Radiologist  This document has been electronically signed. Oct 19 2022  1:23PM    < end of copied text >

## 2022-10-19 NOTE — PROGRESS NOTE ADULT - SUBJECTIVE AND OBJECTIVE BOX
JANA ESCOBEDO  [id number]  87y Female    Indication for ICU admission:    Admit Date:10-15-22  ICU Date: 10-15-22  OR Date:     Tegretol (Hives; Rash)    PAST MEDICAL & SURGICAL HISTORY:  Atrial fibrillation  Hypertension  Hypothyroid  Anxiety  H/O colonoscopy - 10 yrs ago  History of cholecystectomy      Home Medications:  atorvastatin 10 mg oral tablet: 1 tab(s) orally once a day (15 Oct 2022 14:38)  Cymbalta 30 mg oral delayed release capsule: 1 cap(s) orally 2 times a day (15 Oct 2022 14:38)  levothyroxine 50 mcg (0.05 mg) oral tablet: 1 tab(s) orally once a day (20 Nov 2018 11:17)  losartan-hydrochlorothiazide 100 mg-12.5 mg oral tablet: 1 tab(s) orally once a day (15 Oct 2022 14:37)  metoprolol tartrate 25 mg oral tablet: orally once a day (20 Nov 2018 11:17)  Myrbetriq 50 mg oral tablet, extended release: 1 tab(s) orally once a day (15 Oct 2022 14:37)  Trileptal 150 mg oral tablet: 1 tab(s) orally 2 times a day (15 Oct 2022 14:39)  warfarin 5 mg oral tablet: 1 tab(s) orally once a day (20 Nov 2018 11:17)      24HRS EVENT:  10/18  Day  - MRI pending  - restarted HCTZ 12.5mg daily  - increased salt tabs to 2g q8  - BMP @11 for sodium  - Downgrade    Night  -HR increased to 125 after missing PO dose metoprolol for MRI  -PO dose given> Hr <100  -sodium trend> 127 from 131  -Repeat urine lytes, BMP in AM  -K and Mg repleted  -mri completed    DVT PTX: hsq    GI PTX:pantoprazole    Tablet 40 milliGRAM(s) Oral before breakfast      ***Tubes/Lines/Drains  ***  Peripheral IV      REVIEW OF SYSTEMS    [x] A ten-point review of systems was otherwise negative except as noted.  [ ] Due to altered mental status/intubation, subjective information were not able to be obtained from the patient. History was obtained, to the extent possible, from review of the chart and collateral sources of information.         JANA ESCOBEDO  [id number]  87y Female    Indication for ICU admission:    Admit Date:10-15-22  ICU Date: 10-15-22  OR Date:     Tegretol (Hives; Rash)    PAST MEDICAL & SURGICAL HISTORY:  Atrial fibrillation  Hypertension  Hypothyroid  Anxiety  H/O colonoscopy - 10 yrs ago  History of cholecystectomy      Home Medications:  atorvastatin 10 mg oral tablet: 1 tab(s) orally once a day (15 Oct 2022 14:38)  Cymbalta 30 mg oral delayed release capsule: 1 cap(s) orally 2 times a day (15 Oct 2022 14:38)  levothyroxine 50 mcg (0.05 mg) oral tablet: 1 tab(s) orally once a day (20 Nov 2018 11:17)  losartan-hydrochlorothiazide 100 mg-12.5 mg oral tablet: 1 tab(s) orally once a day (15 Oct 2022 14:37)  metoprolol tartrate 25 mg oral tablet: orally once a day (20 Nov 2018 11:17)  Myrbetriq 50 mg oral tablet, extended release: 1 tab(s) orally once a day (15 Oct 2022 14:37)  Trileptal 150 mg oral tablet: 1 tab(s) orally 2 times a day (15 Oct 2022 14:39)  warfarin 5 mg oral tablet: 1 tab(s) orally once a day (20 Nov 2018 11:17)      24HRS EVENT:  10/18  Day  - MRI pending  - restarted HCTZ 12.5mg daily  - increased salt tabs to 2g q8  - BMP @11 for sodium  - Downgrade    Night  -HR increased to 125 after missing PO dose metoprolol for MRI  -PO dose given> Hr <100  -sodium trend> 127 from 131  -Repeat urine lytes, BMP in AM  -K and Mg repleted  -mri completed    DVT PTX: hsq    GI PTX:pantoprazole    Tablet 40 milliGRAM(s) Oral before breakfast      ***Tubes/Lines/Drains  ***  Peripheral IV      REVIEW OF SYSTEMS    [x] A ten-point review of systems was otherwise negative except as noted.  [ ] Due to altered mental status/intubation, subjective information were not able to be obtained from the patient. History was obtained, to the extent possible, from review of the chart and collateral sources of information.        Daily     Daily     Diet, Regular (10-18-22 @ 19:49)      CURRENT MEDS:  Neurologic Medications  acetaminophen     Tablet .. 650 milliGRAM(s) Oral every 6 hours  DULoxetine 30 milliGRAM(s) Oral daily    Respiratory Medications    Cardiovascular Medications  hydrochlorothiazide 12.5 milliGRAM(s) Oral daily  losartan 100 milliGRAM(s) Oral daily  metoprolol tartrate 25 milliGRAM(s) Oral every 12 hours    Gastrointestinal Medications  pantoprazole    Tablet 40 milliGRAM(s) Oral before breakfast  sodium chloride 2 Gram(s) Oral every 8 hours    Genitourinary Medications    Hematologic/Oncologic Medications  heparin   Injectable 5000 Unit(s) SubCutaneous every 8 hours    Antimicrobial/Immunologic Medications    Endocrine/Metabolic Medications  atorvastatin 10 milliGRAM(s) Oral at bedtime  levothyroxine 50 MICROGram(s) Oral daily    Topical/Other Medications  lidocaine   4% Patch 1 Patch Transdermal every 24 hours      ICU Vital Signs Last 24 Hrs  T(C): 35.8 (19 Oct 2022 07:48), Max: 36.7 (18 Oct 2022 20:00)  T(F): 96.5 (19 Oct 2022 07:48), Max: 98 (18 Oct 2022 20:00)  HR: 82 (19 Oct 2022 07:00) (82 - 107)  BP: 169/83 (19 Oct 2022 07:00) (138/73 - 192/118)  BP(mean): 119 (19 Oct 2022 07:00) (100 - 141)  ABP: --  ABP(mean): --  RR: 16 (19 Oct 2022 07:00) (16 - 36)  SpO2: 98% (19 Oct 2022 07:00) (97% - 100%)        Adult Advanced Hemodynamics Last 24 Hrs  CVP(mm Hg): --  CVP(cm H2O): --  CO: --  CI: --  PA: --  PA(mean): --  PCWP: --  SVR: --  SVRI: --  PVR: --  PVRI: --          I&O's Summary    18 Oct 2022 07:01  -  19 Oct 2022 07:00  --------------------------------------------------------  IN: 650 mL / OUT: 1700 mL / NET: -1050 mL      I&O's Detail    18 Oct 2022 07:01  -  19 Oct 2022 07:00  --------------------------------------------------------  IN:    Oral Fluid: 650 mL  Total IN: 650 mL    OUT:    Voided (mL): 1700 mL  Total OUT: 1700 mL    Total NET: -1050 mL          PHYSICAL EXAM:    General/Neuro  Deficits: alert & oriented x 3, no focal deficits, SHEEHAN    Lungs:      clear to auscultation, Normal expansion/effort.     Cardiovascular : S1, S2.  Regular rate and rhythm.    Cardiac Rhythm: Normal Sinus Rhythm    GI: Abdomen soft, Non-tender, Non-distended.      Extremities: Extremities warm, pink, well-perfused.    Derm: Good skin turgor, no skin breakdown.      : Voiding      CXR:       LABS:  CAPILLARY BLOOD GLUCOSE                              10.2   2.04  )-----------( 94       ( 18 Oct 2022 23:43 )             29.6       10-19    126<L>  |  90<L>  |  8<L>  ----------------------------<  97  4.1   |  28  |  0.5<L>    Ca    8.3<L>      19 Oct 2022 05:34  Phos  3.5     10-18  Mg     1.6     10-18        PT/INR - ( 17 Oct 2022 22:37 )   PT: 20.00 sec;   INR: 1.73 ratio

## 2022-10-19 NOTE — PROGRESS NOTE ADULT - ASSESSMENT
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    PLAN:  - F/u MR imaging   - F/u 4A  - PT/rehab

## 2022-10-19 NOTE — CONSULT NOTE ADULT - CONSULT REQUESTED DATE/TIME
16-Oct-2022 13:59
16-Oct-2022 08:09
19-Oct-2022 11:17
19-Oct-2022 12:14
15-Oct-2022 15:26
15-Oct-2022 15:30
17-Oct-2022 11:34
17-Oct-2022 13:25

## 2022-10-19 NOTE — CHART NOTE - NSCHARTNOTEFT_GEN_A_CORE
Spoke with neuroendovascular PA [phone] regarding MR imaging results    < from: MR Venogram Head w/wo IV Cont (10.18.22 @ 19:21) >    BRAIN  No acute intracranial pathology or abnormal enhancement.  Previously described trace subarachnoid hemorrhage in the right quadrigeminal plate cistern likely represents vessel calcification.  Moderate chronic microvascular type changes.    BRAIN MRA  No evidence of flow-limiting stenosis, aneurysm or vessel malformation.    BRAIN MRV:  No dural venous sinus thrombosis or stenosis.    < end of copied text >      Verbal plan includes no acute inpatient neuroendovascular intervention at this time.   Follow up outpatient.     Trauma team will follow up official neuroendovascular recommendations.

## 2022-10-19 NOTE — CONSULT NOTE ADULT - SUBJECTIVE AND OBJECTIVE BOX
NEPHROLOGY CONSULTATION NOTE    87y Female with PMH of HTN. HLD, Hypothyroid disease, Afib (on warfarin- last dose last night), mild myelodysplastic syndrome (on Vizada Q 28 days- last treatment 1 week ago), Trigeminal Neuralgia ( on Trileptal, prior gamma knife procedure), B/L knee Osteoarthritis- walker device dependent was brought in by EMS after an unwitnessed fall at home. Imaging shows small acute SAH within the right quadrigeminal plate cistern and right 7-10 rib fxs.   Patient seen at bedside, no major complaints.  VS within acceptable range.    PAST MEDICAL & SURGICAL HISTORY:  Atrial fibrillation      Hypertension      Hypothyroid      Anxiety      H/O colonoscopy  10 yrs ago      History of cholecystectomy        Allergies:  Tegretol (Hives; Rash)    Home Medications Reviewed  Hospital Medications:   MEDICATIONS  (STANDING):  acetaminophen     Tablet .. 650 milliGRAM(s) Oral every 6 hours  atorvastatin 10 milliGRAM(s) Oral at bedtime  DULoxetine 30 milliGRAM(s) Oral daily  heparin   Injectable 5000 Unit(s) SubCutaneous every 8 hours  levothyroxine 50 MICROGram(s) Oral daily  lidocaine   4% Patch 1 Patch Transdermal every 24 hours  losartan 100 milliGRAM(s) Oral daily  metoprolol tartrate 25 milliGRAM(s) Oral every 12 hours  pantoprazole    Tablet 40 milliGRAM(s) Oral before breakfast  sodium chloride 3 Gram(s) Oral every 8 hours  sodium chloride 0.9%. 1000 milliLiter(s) (75 mL/Hr) IV Continuous <Continuous>    SOCIAL HISTORY:  Denies ETOH,Smoking,   FAMILY HISTORY:      REVIEW OF SYSTEMS:  CONSTITUTIONAL: No weakness, fevers or chills  EYES/ENT: No visual changes;  No vertigo or throat pain   NECK: No pain or stiffness  RESPIRATORY: No cough, wheezing, hemoptysis; No shortness of breath  CARDIOVASCULAR: No chest pain or palpitations.  GASTROINTESTINAL: No abdominal or epigastric pain. No nausea, vomiting, or hematemesis; No diarrhea or constipation. No melena or hematochezia.  GENITOURINARY: No dysuria, frequency, foamy urine, urinary urgency, incontinence or hematuria  NEUROLOGICAL: No numbness or weakness  SKIN: No itching, burning, rashes, or lesions   VASCULAR: No bilateral lower extremity edema.   All other review of systems is negative unless indicated above.    VITALS:  Vital Signs Last 24 Hrs  T(C): 35.8 (19 Oct 2022 07:48), Max: 36.7 (18 Oct 2022 20:00)  T(F): 96.5 (19 Oct 2022 07:48), Max: 98 (18 Oct 2022 20:00)  HR: 87 (19 Oct 2022 08:00) (82 - 107)  BP: 151/67 (19 Oct 2022 08:00) (138/73 - 192/118)  BP(mean): 97 (19 Oct 2022 08:00) (97 - 141)  RR: 19 (19 Oct 2022 08:00) (16 - 36)  SpO2: 100% (19 Oct 2022 08:00) (97% - 100%)    Parameters below as of 19 Oct 2022 08:00  Patient On (Oxygen Delivery Method): room air        10-18 @ 07:  -  10-19 @ 07:00  --------------------------------------------------------  IN: 650 mL / OUT: 1700 mL / NET: -1050 mL    10-19 @ 07:01  -  10-19 @ 12:14  --------------------------------------------------------  IN: 315 mL / OUT: 150 mL / NET: 165 mL        PHYSICAL EXAM:  Constitutional: NAD  HEENT: anicteric sclera, oropharynx clear, MMM  Neck: No JVD  Respiratory: CTAB, no wheezes, rales or rhonchi  Cardiovascular: S1, S2, RRR  Gastrointestinal: BS+, soft, NT/ND  Extremities: No cyanosis or clubbing. No peripheral edema  Neurological: A/O x 3, no focal deficits  Psychiatric: Normal mood, normal affect  : No CVA tenderness. No cantrell.   Skin: No rashes      LABS:  10-19    126<L>  |  90<L>  |  8<L>  ----------------------------<  97  4.1   |  28  |  0.5<L>    Ca    8.3<L>      19 Oct 2022 05:34  Phos  3.5     10  Mg     1.6     10-18      Creatinine Trend: 0.5 <--, 0.6 <--, <0.5 <--, <0.5 <--, 0.5 <--, 0.5 <--, 0.5 <--, 0.6 <--, 0.5 <--, 0.5 <--, <0.5 <--, <0.5 <--, <0.5 <--, <0.5 <--                        10.2   2.04  )-----------( 94       ( 18 Oct 2022 23:43 )             29.6     Urine Studies:  Urinalysis Basic - ( 16 Oct 2022 16:10 )    Color: Yellow / Appearance: Slightly Turbid / S.029 / pH:   Gluc:  / Ketone: Moderate  / Bili: Negative / Urobili: 3 mg/dL   Blood:  / Protein: 100 mg/dL / Nitrite: Negative   Leuk Esterase: Negative / RBC: 55 /HPF / WBC 6 /HPF   Sq Epi:  / Non Sq Epi: 0 /HPF / Bacteria: Negative      Sodium, Random Urine: 179.0 mmoL/L (10-19 @ 10:20)  Osmolality, Random Urine: 495 mos/kg (10-19 @ 10:20)  Osmolality, Random Urine: 384 mos/kg (10-16 @ 16:10)  Sodium, Random Urine: 100.0 mmoL/L (10-16 @ 16:10)                     NEPHROLOGY CONSULTATION NOTE    87y Female with PMH of HTN. HLD, Hypothyroid disease, Afib (on warfarin- last dose last night), mild myelodysplastic syndrome (on Vizada Q 28 days- last treatment 1 week ago), Trigeminal Neuralgia ( on Trileptal, prior gamma knife procedure), B/L knee Osteoarthritis- walker device dependent was brought in by EMS after an unwitnessed fall at home. Imaging shows small acute SAH within the right quadrigeminal plate cistern and right 7-10 rib fxs.   Patient seen at bedside, no major complaints.  VS within acceptable range.    PAST MEDICAL & SURGICAL HISTORY:  Atrial fibrillation      Hypertension      Hypothyroid      Anxiety      H/O colonoscopy  10 yrs ago      History of cholecystectomy        Allergies:  Tegretol (Hives; Rash)    Home Medications Reviewed  Hospital Medications:   MEDICATIONS  (STANDING):  acetaminophen     Tablet .. 650 milliGRAM(s) Oral every 6 hours  atorvastatin 10 milliGRAM(s) Oral at bedtime  DULoxetine 30 milliGRAM(s) Oral daily  heparin   Injectable 5000 Unit(s) SubCutaneous every 8 hours  levothyroxine 50 MICROGram(s) Oral daily  lidocaine   4% Patch 1 Patch Transdermal every 24 hours  losartan 100 milliGRAM(s) Oral daily  metoprolol tartrate 25 milliGRAM(s) Oral every 12 hours  pantoprazole    Tablet 40 milliGRAM(s) Oral before breakfast  sodium chloride 3 Gram(s) Oral every 8 hours  sodium chloride 0.9%. 1000 milliLiter(s) (75 mL/Hr) IV Continuous <Continuous>    SOCIAL HISTORY:  Denies ETOH,Smoking,   FAMILY HISTORY:      REVIEW OF SYSTEMS:  CONSTITUTIONAL: No weakness, fevers or chills  EYES/ENT: No visual changes;  No vertigo or throat pain   NECK: No pain or stiffness  RESPIRATORY: No cough, wheezing, hemoptysis; No shortness of breath  CARDIOVASCULAR: No chest pain or palpitations.  GASTROINTESTINAL: No abdominal or epigastric pain. No nausea, vomiting, or hematemesis; No diarrhea or constipation. No melena or hematochezia.  GENITOURINARY: No dysuria, frequency, foamy urine, urinary urgency, incontinence or hematuria  NEUROLOGICAL: No numbness or weakness  SKIN: No itching, burning, rashes, or lesions   VASCULAR: No bilateral lower extremity edema.   All other review of systems is negative unless indicated above.    VITALS:  Vital Signs Last 24 Hrs  T(C): 35.8 (19 Oct 2022 07:48), Max: 36.7 (18 Oct 2022 20:00)  T(F): 96.5 (19 Oct 2022 07:48), Max: 98 (18 Oct 2022 20:00)  HR: 87 (19 Oct 2022 08:00) (82 - 107)  BP: 151/67 (19 Oct 2022 08:00) (138/73 - 192/118)  BP(mean): 97 (19 Oct 2022 08:00) (97 - 141)  RR: 19 (19 Oct 2022 08:00) (16 - 36)  SpO2: 100% (19 Oct 2022 08:00) (97% - 100%)    Parameters below as of 19 Oct 2022 08:00  Patient On (Oxygen Delivery Method): room air        10-18 @ 07:  -  10-19 @ 07:00  --------------------------------------------------------  IN: 650 mL / OUT: 1700 mL / NET: -1050 mL    10-19 @ 07:01  -  10-19 @ 12:14  --------------------------------------------------------  IN: 315 mL / OUT: 150 mL / NET: 165 mL        PHYSICAL EXAM:  Constitutional: NAD  HEENT: anicteric sclera, oropharynx clear, MMM  Neck: No JVD  Respiratory: CTAB, no wheezes, rales or rhonchi  Cardiovascular: S1, S2, RRR  Gastrointestinal: BS+, soft, NT/ND  Extremities: No cyanosis or clubbing. No peripheral edema  Neurological: A/O x 3, no focal deficits  Psychiatric: Normal mood, normal affect  : No CVA tenderness. No cantrell.   Skin: No rashes      LABS:  10-19    126<L>  |  90<L>  |  8<L>  ----------------------------<  97  4.1   |  28  |  0.5<L>    Ca    8.3<L>      19 Oct 2022 05:34  Phos  3.5     10-18  Mg     1.6     10-18      Sodium, Serum: 124 mmol/L (10-19-22 @ 18:34)  Sodium, Serum: 124 mmol/L (10-19-22 @ 11:18)  Sodium, Serum: 126 mmol/L (10-19-22 @ 05:34)  Sodium, Serum: 127 mmol/L (10-18-22 @ 23:43)  Sodium, Serum: 131 mmol/L (10-18-22 @ 11:39)  Sodium, Serum: 132 mmol/L (10-18-22 @ 05:33)  Sodium, Serum: 135 mmol/L (10-17-22 @ 22:37)  Sodium, Serum: 135 mmol/L (10-17-22 @ 11:38)  Sodium, Serum: 130 mmol/L (10-17-22 @ 04:30)  Sodium, Serum: 125 mmol/L (10-16-22 @ 22:26)  Sodium, Serum: 123 mmol/L (10-16-22 @ 17:26)  Sodium, Serum: 123 mmol/L (10-16-22 @ 11:29)  Sodium, Serum: 125 mmol/L (10-16-22 @ 06:03)  Sodium, Serum: 116 mmol/L (10-16-22 @ 00:57)  Sodium, Serum: 116 mmol/L (10-15-22 @ 22:45)  Sodium, Serum: 120 mmol/L (10-15-22 @ 10:46)    Osmolality, Random Urine: 495 mos/kg [50 - 1200] (10-19-22 @ 10:20)  Osmolality, Random Urine: 384 mos/kg [50 - 1200] (10-16-22 @ 16:10)  Sodium, Random Urine: 179.0 mmoL/L (10-19-22 @ 10:20)  Osmolality, Serum: 278 mos/kg [280 - 301] (10-17-22 @ 22:37)  Osmolality, Serum: 253 mos/kg [280 - 301] (10-16-22 @ 11:29)  Osmolality, Serum: 241 mos/kg [280 - 301] (10-16-22 @ 00:57)                                10.2   2.04  )-----------( 94       ( 18 Oct 2022 23:43 )             29.6     Urine Studies:  Urinalysis Basic - ( 16 Oct 2022 16:10 )    Color: Yellow / Appearance: Slightly Turbid / S.029 / pH:   Gluc:  / Ketone: Moderate  / Bili: Negative / Urobili: 3 mg/dL   Blood:  / Protein: 100 mg/dL / Nitrite: Negative   Leuk Esterase: Negative / RBC: 55 /HPF / WBC 6 /HPF   Sq Epi:  / Non Sq Epi: 0 /HPF / Bacteria: Negative      Sodium, Random Urine: 179.0 mmoL/L (10-19 @ 10:20)  Osmolality, Random Urine: 495 mos/kg (10-19 @ 10:20)  Osmolality, Random Urine: 384 mos/kg (10-16 @ 16:10)  Sodium, Random Urine: 100.0 mmoL/L (10-16 @ 16:10)

## 2022-10-19 NOTE — CONSULT NOTE ADULT - ASSESSMENT
87y Female with PMH of HTN. HLD, Hypothyroid disease, Afib (on warfarin), mild myelodysplastic syndrome (on Vizada Q 28 days- last treatment 1 week ago), Trigeminal Neuralgia ( on Trileptal, prior gamma knife procedure), B/L knee Osteoarthritis- walker device dependent was brought in by EMS after an unwitnessed fall at home.    #SAH   - follow up neurosx   - MRI  - Anticoagulation as per SICU team and NSX will need to clear    #Hypona  - hold HCTZ  - was <10 meq in first 24 hrs  - Renal consult   - agree to IVF  - check BMP in evening   - check TSH   - check urine lytes    #Atrial fibrillation  - contine lopressor   - hold anticoagulation as per NSX    Hypertension  - pt htn   - did not get losartan yest  - continue losratan  - DC HCTZ   - if need consider starting procardia 60 xl if BP >160s     #Hypothyroid- continue synthroid, check TSH     #Anxiety- duloxetine       PPi   DVT ppx as per surgical team     medicine will follow   DW primary team

## 2022-10-19 NOTE — CONSULT NOTE ADULT - REASON FOR ADMISSION
S/P Fall, subarachnoid hemorrhage, multiple rib fracture

## 2022-10-20 ENCOUNTER — TRANSCRIPTION ENCOUNTER (OUTPATIENT)
Age: 87
End: 2022-10-20

## 2022-10-20 ENCOUNTER — INPATIENT (INPATIENT)
Facility: HOSPITAL | Age: 87
LOS: 12 days | Discharge: HOME HEALTH | End: 2022-11-02
Attending: PHYSICAL MEDICINE & REHABILITATION | Admitting: PHYSICAL MEDICINE & REHABILITATION

## 2022-10-20 VITALS
OXYGEN SATURATION: 98 % | DIASTOLIC BLOOD PRESSURE: 68 MMHG | RESPIRATION RATE: 16 BRPM | SYSTOLIC BLOOD PRESSURE: 131 MMHG | HEART RATE: 108 BPM

## 2022-10-20 VITALS
RESPIRATION RATE: 18 BRPM | SYSTOLIC BLOOD PRESSURE: 124 MMHG | TEMPERATURE: 97 F | DIASTOLIC BLOOD PRESSURE: 72 MMHG | HEART RATE: 97 BPM

## 2022-10-20 DIAGNOSIS — Z98.890 OTHER SPECIFIED POSTPROCEDURAL STATES: Chronic | ICD-10-CM

## 2022-10-20 DIAGNOSIS — Z90.49 ACQUIRED ABSENCE OF OTHER SPECIFIED PARTS OF DIGESTIVE TRACT: Chronic | ICD-10-CM

## 2022-10-20 DIAGNOSIS — R56.9 UNSPECIFIED CONVULSIONS: ICD-10-CM

## 2022-10-20 DIAGNOSIS — G50.0 TRIGEMINAL NEURALGIA: ICD-10-CM

## 2022-10-20 LAB
ANION GAP SERPL CALC-SCNC: 11 MMOL/L — SIGNIFICANT CHANGE UP (ref 7–14)
ANION GAP SERPL CALC-SCNC: 9 MMOL/L — SIGNIFICANT CHANGE UP (ref 7–14)
APTT BLD: 44.2 SEC — HIGH (ref 27–39.2)
BUN SERPL-MCNC: 13 MG/DL — SIGNIFICANT CHANGE UP (ref 10–20)
BUN SERPL-MCNC: 9 MG/DL — LOW (ref 10–20)
CALCIUM SERPL-MCNC: 8.3 MG/DL — LOW (ref 8.4–10.5)
CALCIUM SERPL-MCNC: 8.5 MG/DL — SIGNIFICANT CHANGE UP (ref 8.4–10.5)
CHLORIDE SERPL-SCNC: 90 MMOL/L — LOW (ref 98–110)
CHLORIDE SERPL-SCNC: 91 MMOL/L — LOW (ref 98–110)
CO2 SERPL-SCNC: 23 MMOL/L — SIGNIFICANT CHANGE UP (ref 17–32)
CO2 SERPL-SCNC: 28 MMOL/L — SIGNIFICANT CHANGE UP (ref 17–32)
CREAT SERPL-MCNC: 0.6 MG/DL — LOW (ref 0.7–1.5)
CREAT SERPL-MCNC: <0.5 MG/DL — LOW (ref 0.7–1.5)
EGFR: 88 ML/MIN/1.73M2 — SIGNIFICANT CHANGE UP
EGFR: 97 ML/MIN/1.73M2 — SIGNIFICANT CHANGE UP
GLUCOSE BLDC GLUCOMTR-MCNC: 107 MG/DL — HIGH (ref 70–99)
GLUCOSE BLDC GLUCOMTR-MCNC: 109 MG/DL — HIGH (ref 70–99)
GLUCOSE BLDC GLUCOMTR-MCNC: 119 MG/DL — HIGH (ref 70–99)
GLUCOSE BLDC GLUCOMTR-MCNC: 129 MG/DL — HIGH (ref 70–99)
GLUCOSE SERPL-MCNC: 112 MG/DL — HIGH (ref 70–99)
GLUCOSE SERPL-MCNC: 97 MG/DL — SIGNIFICANT CHANGE UP (ref 70–99)
HCT VFR BLD CALC: 28.4 % — LOW (ref 37–47)
HGB BLD-MCNC: 10 G/DL — LOW (ref 12–16)
INR BLD: 1.11 RATIO — SIGNIFICANT CHANGE UP (ref 0.65–1.3)
MCHC RBC-ENTMCNC: 34 PG — HIGH (ref 27–31)
MCHC RBC-ENTMCNC: 35.2 G/DL — SIGNIFICANT CHANGE UP (ref 32–37)
MCV RBC AUTO: 96.6 FL — SIGNIFICANT CHANGE UP (ref 81–99)
NRBC # BLD: 0 /100 WBCS — SIGNIFICANT CHANGE UP (ref 0–0)
PLATELET # BLD AUTO: 97 K/UL — LOW (ref 130–400)
POTASSIUM SERPL-MCNC: 3.8 MMOL/L — SIGNIFICANT CHANGE UP (ref 3.5–5)
POTASSIUM SERPL-MCNC: 4.3 MMOL/L — SIGNIFICANT CHANGE UP (ref 3.5–5)
POTASSIUM SERPL-SCNC: 3.8 MMOL/L — SIGNIFICANT CHANGE UP (ref 3.5–5)
POTASSIUM SERPL-SCNC: 4.3 MMOL/L — SIGNIFICANT CHANGE UP (ref 3.5–5)
PROTHROM AB SERPL-ACNC: 12.7 SEC — SIGNIFICANT CHANGE UP (ref 9.95–12.87)
RBC # BLD: 2.94 M/UL — LOW (ref 4.2–5.4)
RBC # FLD: 14.4 % — SIGNIFICANT CHANGE UP (ref 11.5–14.5)
SODIUM SERPL-SCNC: 125 MMOL/L — LOW (ref 135–146)
SODIUM SERPL-SCNC: 127 MMOL/L — LOW (ref 135–146)
T3 SERPL-MCNC: 81 NG/DL — SIGNIFICANT CHANGE UP (ref 80–200)
T4 AB SER-ACNC: 7.7 UG/DL — SIGNIFICANT CHANGE UP (ref 4.6–12)
TSH SERPL-MCNC: 4.68 UIU/ML — HIGH (ref 0.27–4.2)
URATE SERPL-MCNC: 1.2 MG/DL — LOW (ref 2.5–7)
WBC # BLD: 1.93 K/UL — LOW (ref 4.8–10.8)
WBC # FLD AUTO: 1.93 K/UL — LOW (ref 4.8–10.8)

## 2022-10-20 PROCEDURE — 99233 SBSQ HOSP IP/OBS HIGH 50: CPT

## 2022-10-20 PROCEDURE — 71045 X-RAY EXAM CHEST 1 VIEW: CPT | Mod: 26

## 2022-10-20 PROCEDURE — 93010 ELECTROCARDIOGRAM REPORT: CPT

## 2022-10-20 RX ORDER — DULOXETINE HYDROCHLORIDE 30 MG/1
30 CAPSULE, DELAYED RELEASE ORAL DAILY
Refills: 0 | Status: DISCONTINUED | OUTPATIENT
Start: 2022-10-20 | End: 2022-10-20

## 2022-10-20 RX ORDER — OXCARBAZEPINE 300 MG/1
1 TABLET, FILM COATED ORAL
Qty: 0 | Refills: 0 | DISCHARGE

## 2022-10-20 RX ORDER — ATORVASTATIN CALCIUM 80 MG/1
10 TABLET, FILM COATED ORAL AT BEDTIME
Refills: 0 | Status: DISCONTINUED | OUTPATIENT
Start: 2022-10-20 | End: 2022-11-02

## 2022-10-20 RX ORDER — LABETALOL HCL 100 MG
10 TABLET ORAL ONCE
Refills: 0 | Status: COMPLETED | OUTPATIENT
Start: 2022-10-20 | End: 2022-10-20

## 2022-10-20 RX ORDER — SODIUM CHLORIDE 9 MG/ML
3 INJECTION INTRAMUSCULAR; INTRAVENOUS; SUBCUTANEOUS
Qty: 0 | Refills: 0 | DISCHARGE
Start: 2022-10-20

## 2022-10-20 RX ORDER — SODIUM CHLORIDE 9 MG/ML
3 INJECTION INTRAMUSCULAR; INTRAVENOUS; SUBCUTANEOUS EVERY 8 HOURS
Refills: 0 | Status: DISCONTINUED | OUTPATIENT
Start: 2022-10-20 | End: 2022-10-25

## 2022-10-20 RX ORDER — WARFARIN SODIUM 2.5 MG/1
5 TABLET ORAL ONCE
Refills: 0 | Status: COMPLETED | OUTPATIENT
Start: 2022-10-20 | End: 2022-10-20

## 2022-10-20 RX ORDER — LOSARTAN/HYDROCHLOROTHIAZIDE 100MG-25MG
1 TABLET ORAL
Qty: 0 | Refills: 0 | DISCHARGE

## 2022-10-20 RX ORDER — INFLUENZA VIRUS VACCINE 15; 15; 15; 15 UG/.5ML; UG/.5ML; UG/.5ML; UG/.5ML
0.7 SUSPENSION INTRAMUSCULAR ONCE
Refills: 0 | Status: DISCONTINUED | OUTPATIENT
Start: 2022-10-20 | End: 2022-11-02

## 2022-10-20 RX ORDER — WARFARIN SODIUM 2.5 MG/1
1 TABLET ORAL
Qty: 0 | Refills: 0 | DISCHARGE

## 2022-10-20 RX ORDER — NIFEDIPINE 30 MG
30 TABLET, EXTENDED RELEASE 24 HR ORAL DAILY
Refills: 0 | Status: DISCONTINUED | OUTPATIENT
Start: 2022-10-20 | End: 2022-11-02

## 2022-10-20 RX ORDER — DULOXETINE HYDROCHLORIDE 30 MG/1
30 CAPSULE, DELAYED RELEASE ORAL EVERY 12 HOURS
Refills: 0 | Status: DISCONTINUED | OUTPATIENT
Start: 2022-10-20 | End: 2022-11-02

## 2022-10-20 RX ORDER — LIDOCAINE 4 G/100G
1 CREAM TOPICAL EVERY 24 HOURS
Refills: 0 | Status: DISCONTINUED | OUTPATIENT
Start: 2022-10-20 | End: 2022-11-02

## 2022-10-20 RX ORDER — NIFEDIPINE 30 MG
1 TABLET, EXTENDED RELEASE 24 HR ORAL
Qty: 0 | Refills: 0 | DISCHARGE
Start: 2022-10-20

## 2022-10-20 RX ORDER — ACETAMINOPHEN 500 MG
650 TABLET ORAL EVERY 6 HOURS
Refills: 0 | Status: DISCONTINUED | OUTPATIENT
Start: 2022-10-20 | End: 2022-11-02

## 2022-10-20 RX ORDER — PANTOPRAZOLE SODIUM 20 MG/1
40 TABLET, DELAYED RELEASE ORAL
Refills: 0 | Status: DISCONTINUED | OUTPATIENT
Start: 2022-10-20 | End: 2022-11-02

## 2022-10-20 RX ORDER — LOSARTAN POTASSIUM 100 MG/1
100 TABLET, FILM COATED ORAL DAILY
Refills: 0 | Status: DISCONTINUED | OUTPATIENT
Start: 2022-10-20 | End: 2022-11-02

## 2022-10-20 RX ORDER — LOSARTAN POTASSIUM 100 MG/1
1 TABLET, FILM COATED ORAL
Qty: 0 | Refills: 0 | DISCHARGE
Start: 2022-10-20

## 2022-10-20 RX ORDER — METOPROLOL TARTRATE 50 MG
25 TABLET ORAL EVERY 12 HOURS
Refills: 0 | Status: DISCONTINUED | OUTPATIENT
Start: 2022-10-20 | End: 2022-11-02

## 2022-10-20 RX ORDER — LEVOTHYROXINE SODIUM 125 MCG
50 TABLET ORAL DAILY
Refills: 0 | Status: DISCONTINUED | OUTPATIENT
Start: 2022-10-20 | End: 2022-11-02

## 2022-10-20 RX ORDER — OXYBUTYNIN CHLORIDE 5 MG
5 TABLET ORAL EVERY 12 HOURS
Refills: 0 | Status: DISCONTINUED | OUTPATIENT
Start: 2022-10-20 | End: 2022-11-02

## 2022-10-20 RX ADMIN — Medication 25 MILLIGRAM(S): at 06:20

## 2022-10-20 RX ADMIN — LIDOCAINE 1 PATCH: 4 CREAM TOPICAL at 21:23

## 2022-10-20 RX ADMIN — SODIUM CHLORIDE 3 GRAM(S): 9 INJECTION INTRAMUSCULAR; INTRAVENOUS; SUBCUTANEOUS at 06:21

## 2022-10-20 RX ADMIN — LOSARTAN POTASSIUM 100 MILLIGRAM(S): 100 TABLET, FILM COATED ORAL at 06:21

## 2022-10-20 RX ADMIN — SODIUM CHLORIDE 3 GRAM(S): 9 INJECTION INTRAMUSCULAR; INTRAVENOUS; SUBCUTANEOUS at 21:25

## 2022-10-20 RX ADMIN — Medication 25 MILLIGRAM(S): at 19:03

## 2022-10-20 RX ADMIN — DULOXETINE HYDROCHLORIDE 30 MILLIGRAM(S): 30 CAPSULE, DELAYED RELEASE ORAL at 12:12

## 2022-10-20 RX ADMIN — Medication 650 MILLIGRAM(S): at 12:12

## 2022-10-20 RX ADMIN — Medication 100 GRAM(S): at 00:26

## 2022-10-20 RX ADMIN — Medication 30 MILLIGRAM(S): at 19:02

## 2022-10-20 RX ADMIN — ATORVASTATIN CALCIUM 10 MILLIGRAM(S): 80 TABLET, FILM COATED ORAL at 19:03

## 2022-10-20 RX ADMIN — Medication 50 MICROGRAM(S): at 19:03

## 2022-10-20 RX ADMIN — Medication 30 MILLIGRAM(S): at 06:20

## 2022-10-20 RX ADMIN — SODIUM CHLORIDE 3 GRAM(S): 9 INJECTION INTRAMUSCULAR; INTRAVENOUS; SUBCUTANEOUS at 13:28

## 2022-10-20 RX ADMIN — Medication 650 MILLIGRAM(S): at 13:10

## 2022-10-20 RX ADMIN — Medication 650 MILLIGRAM(S): at 06:21

## 2022-10-20 RX ADMIN — LIDOCAINE 1 PATCH: 4 CREAM TOPICAL at 09:14

## 2022-10-20 RX ADMIN — HEPARIN SODIUM 5000 UNIT(S): 5000 INJECTION INTRAVENOUS; SUBCUTANEOUS at 13:28

## 2022-10-20 RX ADMIN — PANTOPRAZOLE SODIUM 40 MILLIGRAM(S): 20 TABLET, DELAYED RELEASE ORAL at 06:21

## 2022-10-20 RX ADMIN — Medication 10 MILLIGRAM(S): at 04:06

## 2022-10-20 RX ADMIN — HEPARIN SODIUM 5000 UNIT(S): 5000 INJECTION INTRAVENOUS; SUBCUTANEOUS at 06:21

## 2022-10-20 RX ADMIN — Medication 650 MILLIGRAM(S): at 19:02

## 2022-10-20 RX ADMIN — Medication 650 MILLIGRAM(S): at 23:02

## 2022-10-20 RX ADMIN — Medication 50 MICROGRAM(S): at 06:21

## 2022-10-20 NOTE — PROGRESS NOTE ADULT - ASSESSMENT
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    PLAN:  - F/u MR imaging - no acute intervention per neuroendovascular  - F/u 4A - Per Dr. Hartley, 4a can manage hyponatremia  - discharge to       Trauma/ACS [id number]

## 2022-10-20 NOTE — DISCHARGE NOTE PROVIDER - NSFOLLOWUPCLINICS_GEN_ALL_ED_FT
[facility]  Trauma Surgery  [address] [place] [address]  Phone: [phone]  Fax:   Follow Up Time: 2 weeks

## 2022-10-20 NOTE — DISCHARGE NOTE PROVIDER - HOSPITAL COURSE
Patient is a 86 y/o female with PMHx of MDS ( on Vidaza- gets every 28 days- just finished course- due in around 3 weeks) HTN, HLD, A-Fib ( On Coumadin 5mg daily), hypothyroid, neuropathy of B/L LE, Trigeminal Neuralgia ( Prior Gamma knife procedure for pain), B/L OA of the knees, prior CCY who presented to [facility] s/p fall at home. Patient was ambulating at home with the walker when she fell onto the right side. She doesn't have the best recollection of the event but denies LOC. She has a home health aide and her grandson was also home at the time. She was able to stand and walk but presented as notes ongoing dizziness and pain over her right chest. Pain over the right side is dull, worse with motion, but not sharp. She notes dizziness notable after fall but not prior. With the dizziness she gets occasional nausea. Prior to fall she denies any change in medications, new medications, recent illness, nor any other features out different from her baseline .     Work up was significant for a small acute SAH within right quadrigeminal plate cistern and right minimally displaced rib 7-10 fractures. There was also concern for a venous plexus. Repeat imaging was stable. MRIs were performed to evaluate for the venous plexus and came back negative except for chronic microvascular changes.    Over the course of the hospital stay, the patient has experienced hyponatremia and hypertension. BP has been controlled on metoprolol, losartan, and nifedipine. Patient's home hydrochlorothiazide has been held due to the hyponatremia. Sodium is being managed with salt tabs and <1L/day fluid restriction. Patient is pulling 750cc on incentive spirometry and their pain is controlled.     Patient has been deemed to be a good candidate for acute inpatient rehab. Per 4A, they can manage patient's hyponatremia. Patient has no other issues and is medically stable for discharge.

## 2022-10-20 NOTE — PROGRESS NOTE ADULT - NS ATTEND AMEND GEN_ALL_CORE FT
doing well, na 127, improving slowly, renal following. good urine output.
doing well. na 132. good urine output. for MRI today.
doing well, na 126, will stop hctz. start NS at 75/hr. consult renal. good urine output. diet. MRI today.

## 2022-10-20 NOTE — PROGRESS NOTE ADULT - SUBJECTIVE AND OBJECTIVE BOX
GENERAL SURGERY PROGRESS NOTE    Patient: JANA ESCOBEDO , 87y ([birth year])Female   MRN: [id number]  Location: [address]  Visit: 10-15-22 Inpatient  Date: 10-20-22 @ 13:06    Hospital Day #: 6    Events of past 24 hours:    NS dc'd  HTN, nifedipine started    PAST MEDICAL & SURGICAL HISTORY:  Atrial fibrillation    Hypertension    Hypothyroid    Anxiety    H/O colonoscopy  10 yrs ago    History of cholecystectomy      Vitals:   T(F): 97 (10-20-22 @ 12:25), Max: 98 (10-19-22 @ 20:00)  HR: 87 (10-20-22 @ 13:00)  BP: 122/60 (10-20-22 @ 13:00)  RR: 16 (10-20-22 @ 13:00)  SpO2: 99% (10-20-22 @ 13:00)      Diet, Regular      Fluids:     I & O's:    10-19-22 @ 07:01  -  10-20-22 @ 07:00  --------------------------------------------------------  IN:    IV PiggyBack: 100 mL    Oral Fluid: 240 mL    sodium chloride 0.9%: 750 mL  Total IN: 1090 mL    OUT:    Voided (mL): 2150 mL  Total OUT: 2150 mL    Total NET: -1060 mL    PHYSICAL EXAM:  General: NAD, calm and cooperative.  Cardiac: RRR.  Respiratory: On RA. Speaks in complete sentences. No accessory muscles of respiration in use. Bilateral chest rise.  Abdomen: Soft, non-distended, non-tender, no rebound, no guarding.   Skin: Warm/dry, normal color, no jaundice.      MEDICATIONS  (STANDING):  acetaminophen     Tablet .. 650 milliGRAM(s) Oral every 6 hours  atorvastatin 10 milliGRAM(s) Oral at bedtime  DULoxetine 30 milliGRAM(s) Oral daily  heparin   Injectable 5000 Unit(s) SubCutaneous every 8 hours  levothyroxine 50 MICROGram(s) Oral daily  lidocaine   4% Patch 1 Patch Transdermal every 24 hours  losartan 100 milliGRAM(s) Oral daily  metoprolol tartrate 25 milliGRAM(s) Oral every 12 hours  NIFEdipine XL 30 milliGRAM(s) Oral daily  pantoprazole    Tablet 40 milliGRAM(s) Oral before breakfast  sodium chloride 3 Gram(s) Oral every 8 hours    MEDICATIONS  (PRN):      DVT PROPHYLAXIS: heparin   Injectable 5000 Unit(s) SubCutaneous every 8 hours    GI PROPHYLAXIS: pantoprazole    Tablet 40 milliGRAM(s) Oral before breakfast    ANTICOAGULATION:   ANTIBIOTICS:        LAB/STUDIES:  Labs:  CAPILLARY BLOOD GLUCOSE      POCT Blood Glucose.: 107 mg/dL (20 Oct 2022 11:30)  POCT Blood Glucose.: 129 mg/dL (20 Oct 2022 08:16)                          10.1   2.04  )-----------( 89       ( 19 Oct 2022 22:53 )             29.0         10-20    127<L>  |  90<L>  |  9<L>  ----------------------------<  97  3.8   |  28  |  <0.5<L>      Calcium, Total Serum: 8.3 mg/dL (10-20-22 @ 05:26)      IMAGING:      < from: Xray Chest 1 View- PORTABLE-Routine (10.20.22 @ 06:49) >  Impression:    Cardiomegaly. CHF.    Unchanged.    < end of copied text >

## 2022-10-20 NOTE — H&P ADULT - HISTORY OF PRESENT ILLNESS
Patient is a 86 y/o female with PMHx of MDS ( on Vidaza- gets every 28 days- just finished course- due in around 3 weeks) HTN, HLD, A-Fib ( On Coumadin 5mg daily), hypothyroid, neuropathy of B/L LE, Trigeminal Neuralgia ( Prior Gamma knife procedure for pain), B/L OA of the knees, prior CCY who presented to [facility] s/p fall at home. Patient was ambulating at home with the walker when she fell onto the right side. She doesn't have the best recollection of the event but denies LOC. She has a home health aide and her grandson was also home at the time. She was able to stand and walk but presented as notes ongoing dizziness and pain over her right chest. Pain over the right side is dull, worse with motion, but not sharp. She notes dizziness notable after fall but not prior. With the dizziness she gets occasional nausea. Prior to fall she denies any change in medications, new medications, recent illness, nor any other features out different from her baseline .  Patient noted on workup to have right sided fracture of the 7-10th right ribs without displacement. On Incentive she can do around 500 but without respiratory distress or hemodynamic instability. She was also noted on CT to have a small right sub arachnoid hemorrhage. Admitted to surgery and will have a SICU consult with possible SICU admission due to trauma burden. She was also noted to have a sodium of 120 and was on HCTZ. She has had a slow, improvement in her Na+ to 126 on fluid restriction and Na+ tabs. Neuro w/u revealed her SAH to be secondary to vascular calcification with a normal MRA and MRV and no further w/u recommended.     She is medically stable, but states that she has been confused and weak since about 1 week PTA. She requires CG/ min assist for transfers and CG for ambulation with a RW 40 feet only secondary to impaired endurance and mod assistance for LE dressing.   PTA, she live with her daughter and had a HHA 8 hrs. a day, 5 days a week. She reports being able to transfer and ambulate with a RW with supervision PTA and being able to dress herself. She was evaluated by me on the Trauma Service and was found to be a good acute inpatient rehab candidate.

## 2022-10-20 NOTE — DISCHARGE NOTE PROVIDER - NSDCMRMEDTOKEN_GEN_ALL_CORE_FT
atorvastatin 10 mg oral tablet: 1 tab(s) orally once a day  Cymbalta 30 mg oral delayed release capsule: 1 cap(s) orally 2 times a day  levothyroxine 50 mcg (0.05 mg) oral tablet: 1 tab(s) orally once a day  losartan 100 mg oral tablet: 1 tab(s) orally once a day  metoprolol tartrate 25 mg oral tablet: orally once a day  Myrbetriq 50 mg oral tablet, extended release: 1 tab(s) orally once a day  NIFEdipine 30 mg oral tablet, extended release: 1 tab(s) orally once a day  sodium chloride 1 g oral tablet: 3 tab(s) orally every 8 hours  warfarin 5 mg oral tablet: 1 tab(s) orally once a day

## 2022-10-20 NOTE — DISCHARGE NOTE NURSING/CASE MANAGEMENT/SOCIAL WORK - PATIENT PORTAL LINK FT
You can access the FollowMyHealth Patient Portal offered by [facility] by registering at the following website: [email or web address]. By joining [employer]’s FollowMyHealth portal, you will also be able to view your health information using other applications (apps) compatible with our system.

## 2022-10-20 NOTE — PATIENT PROFILE ADULT - NSTRANSFERBELONGINGSDISPO_GEN_A_NUR
with patient
78yo M with hx HTN, HLD, CAD, COPD/active smoker presents with alteral mental status and generalized weakness. Per the son at bedside family noted some generalized weakness yesterday but it was only temporary then resolved. Today noted to be weak, unable to walk on his own and noted to be confused-not making sense since the afternoon approx 3pm. Son also noted patient was having difficulty breathing though patient did not complain of shortness of breath. Reports sick contacts at home with cough. Denies recent travel. Patient has not been vaccinated for covid19.

## 2022-10-20 NOTE — PROGRESS NOTE ADULT - ASSESSMENT
Assessment & Plan  87y Female with PMH of HTN. HLD, Hypothyroid disease, Afib (on warfarin- last dose last night), mild myelodysplastic syndrome (on Vizada Q 28 days- last treatment 1 week ago), Trigeminal Neuralgia ( on Trileptal, prior gamma knife procedure), B/L knee Osteoarthritis- walker device dependent was brought in by EMS after an unwitnessed fall at home. Imaging shows small acute SAH within the right quadrigeminal plate cistern and right 7-10 rib fxs.       NEURO:   # Acute SAH  - Rpt Head CTA Head and Neck: hyperdensity in quadrigeminal plate favors small SAH more than venous plexus mild atheroslerotic stenosis in B/L cervical ICA's and B/L carotid siphons- mild atherosclerotic stenosis in B/L V4 vertebral arteries.  - NSGY- no interventions, no further imaging needed unless exam changes        Obtain Neuro IR consult- Rec MRI w and w/o, MRA, MRV- completed pending read and plan        Q4h neuro checks        On HSQ, can resume Coumadin in 1 week (10/22/22)  - 10/17 hold home Trileptal (can cause hyponatremia, pt on for trigeminal neuralgia), no need for seizure ppx as per Neuro    # PMH Trigeminal Neuralgia  - 10/17 holding Trileptal 2/2 hyponatremia    # PMH Neuropathy  - con't Cymbalta    #Pain-controlled     with Tylenol and Lidocaine patch  - 10/17 d/c gabapentin as per neuro    RESP:   # right7-10 rib fxs   - 10/17 d/c daily CXR  - incentive spirometry- pulling 750   - pain control    CARDS:   # A-fib, rate controlled  - con't Metoprolol 25 Q12h  -  home coumadin- held- can resume in 1 week as per NSGY, 10/22  - Coumadin reversed with Kcentra due to intra-cranial bleed  - INR 1.73 on 10/17  - Daily EKG    # PMH of HTN  - con't home regimen, Metoprolol and Losartan  - HCTZ held due to hyponatremia  - Procardia 60XL restarted    # PMH HLD  - con't Lipitor    Trop negative x 3      GI/NUTR:   No acute issues  - Regular Diet  - 10/17 Free water restriction 1L  - GI PPx: PPI    /RENAL:   # Hyponatremia- improving  - Nephrology c/s: Hyponatremia likely secondary to HCTZ and trileptal, possible SIADH component; Continue to hold HCTZ and oxcarbazepine, c/w NS at 75 cc/hr, Fluid restriction, Check TSH and uric acid, d/c salt tablets  - NS @75  - Fluid restrict 500cc per day  - DC- NaCl PO 3g q8   - f/u TSH and Uric acid    Labs:          BUN/Cr- 10/0.6  -->,  13/0.6  -->          Electrolytes-Na 135 // K 4.5 // Mg 1.9 //  Phos 3.3    HEME/ONC:   # PMH of Myelodysplastic syndrome  - receives Vizada Q28 days, last treatment 1 week ago  [ ] Consulted Dr Chavez - pending recommendations      # Leukopenia- WBC 2.0 , improving - 2.04  - likely secondary to MDS treatment  - Hematology consult appreciated- nothing to do  - con't to trend      Labs: Hb/Hct:  10.1/29              Plts:  89              INR:  2.1--> 2.4--> Kcentra--> 1.48 > 1.73    ID:   WBC- 2.04  afebrile  -No signs of infection  -UA- with + bacteria, no leuk, no nitrites- Ceftriaxone x 1 given in the ED  -Repeat UA +lg blood     ENDO:  # PMH Hypothyroidism  rcon't Synthroid 50 mcg QD    # Glucose monitoring  - FS AC/HS      LINES/DRAINS: PIV    DISPO: SURG

## 2022-10-20 NOTE — DISCHARGE NOTE PROVIDER - CARE PROVIDERS DIRECT ADDRESSES
[email or web address]

## 2022-10-20 NOTE — DISCHARGE NOTE PROVIDER - PROVIDER TOKENS
PROVIDER:[TOKEN:[[id number]],FOLLOWUP:[2 weeks]],PROVIDER:[TOKEN:[[id number]],FOLLOWUP:[2 weeks]]

## 2022-10-20 NOTE — H&P ADULT - ASSESSMENT
ASSESSMENT/PLAN    #Rehab of decline in mobility and cognition, s/p fall c/w Metabolic Encephalopathy from Hyponatremia. She also has multiple rib fractures 7- 10 on the right with pain.   She notes confusion and weakness prior to her fall and neuro w/u was c/w vascular calcification (not SAH), and moderate generalized microvascular ischemia. She also has severe OA of both knees, further limiting her mobility and Peripheral Neuropathy of unclear etiology. She requires close medical supervision because of her on going severe hyponatremia currently being treated with fluid restriction and Sodium tablets. She requires and can tolerate 3 hrs a day of intensive interdisciplinary rehab including PT, OT and ST and Neuoropsych eval.     #Hyponatremia  - improving slowly since admission, s/p IV saline, on fluid restriction and NaCl tabs 3 gms. tid.   - was on HCTZ on admission, stopped  - followed by Renal Medicine  - monitor daily    #Peripheral Neuropathy  - dysasthesias of feet and atrophy of intrinsic muscles of hands  - unknown etiology  - was on Trileptal at home and now on Cymbalta    #History of Chronic Atrial Fibrillation  - rate controlled. Currently RRR  - Coumadin on hold. Will discuss with Neuro    #History of Myelodysplasia  - stable pancytopenia  - followed by Heme/Onc.   - On Vidaza every 28 days    #Osteoarthritis  - pain meds, modalities, ther-ex    #Chronic Microvascular Cerebrovascular Disease  - monitor    #HTN  - controlled on current meds    #HLD  - on statin    #[place]  - [employer]  - consider outpatient Audiology eval    #Hypothyroidism  - on synthroid    #Pain control:   - rib fracture  - neuropathy pain  - Tylenol prn, Cymbalta    #GI/Bowel Mgmt: Contipation in hospital  - continue bowel meds and monitor    - Diet: Regular with salt tabs           Precautions / PROPHYLAXIS:      - Falls    - Ortho: Weight bearing status: WBAT      - DVT prophylaxis: Lovenox      MEDICAL PROGNOSIS: GOOD            REHAB POTENTIAL: GOOD             ESTIMATED DISPOSITION: HOME WITH HOME CARE       [ x ]  The goals of the IRF admission were discussed with the patient, who agreed             ELOS:  [  x   ] 7-14    [    ]  14-21    [    ]  Other    THERAPY ORDERS and INITIAL INDIVIDUALIZED PLAN OF CARE:  This initial individualized interdisciplinary plan of care, which was established by me (the attending physiatrist), is based on elements from the post admission evaluation. The interdisciplinary therapy program is to be at least 3 hrs a day, at least 5 days per week from from physical, occupational and/ or speech therapies as ordered by me below.      [ x  ] P.T. 90 mins. /day at least 5 out of 7 days:  [  x ] superficial  modalities prn, [ x  ] A/AAROM, [ x  ] PREs, [ x  ] transfer training,            [ x  ] progressive ambulation, [x   ] stairs                                               [ x  ] O.T. 90 mins. /day at least 5 out of 7 days::  [ x  ] modalities prn,  [ x  ]A/AAROM, [ x  ] PREs, [  x ] functional transfer training, [ x  ] ADL training           [  x ] cognitive/ perceptual eval and training, [   ] splint eval                                                  [ x  ] S.L.P:  [  x ] speech eval, [   ] swallow eval     [ x  ] Neuropsychology eval     [ x  ] Individualized rec. therapy      RATIONALE FOR INPATIENT ADMISSION - Patient demonstrates the following: (check all that apply)  [X] Medically appropriate for acute rehabilitation admission. Requires interdisciplinary therapy consisting of at least PT and OT, at least 3 hrs. a day at least 5 days a week  [X] Has attainable rehab goals with an appropriate initial discharge plan  [X] Has rehabilitation potential (expected to make a significant improvement within a reasonable period of time)  [X] Requires close medical management by a rehab physician, rehab nursing care,  and comprehensive interdisciplinary team (including PT, OT)    [X] Requires evaluation by a physiatrist at least 3 days a week to evaluate and manage and coordinate rehab and medical problems

## 2022-10-20 NOTE — H&P ADULT - NSICDXPASTMEDICALHX_GEN_ALL_CORE_FT
PAST MEDICAL HISTORY:  Anxiety     Atrial fibrillation     Hypertension     Hypothyroid     Neuropathy

## 2022-10-20 NOTE — PROGRESS NOTE ADULT - PROVIDER SPECIALTY LIST ADULT
Physiatry
Nephrology
Neurology
SICU
Trauma Surgery
SICU
Surgery
Surgery
Neurology
SICU
Trauma Surgery
Neurosurgery

## 2022-10-20 NOTE — H&P ADULT - NSHPLABSRESULTS_GEN_ALL_CORE
10.1   2.04  )-----------( 89       ( 19 Oct 2022 22:53 )             29.0     10-20    127<L>  |  90<L>  |  9<L>  ----------------------------<  97  3.8   |  28  |  <0.5<L>    Ca    8.3<L>      20 Oct 2022 05:26  Phos  3.3     10-19  Mg     1.9     10-19    POCT Blood Glucose.: 107 mg/dL (10-20-22 @ 11:30)  POCT Blood Glucose.: 129 mg/dL (10-20-22 @ 08:16)    < from: MR Head w/wo IV Cont (10.18.22 @ 19:21) >    IMPRESSION:  BRAIN  No acute intracranial pathology or abnormal enhancement.    Previously described trace subarachnoid hemorrhage in the right   quadrigeminal plate cistern likely represents vessel calcification.    Moderate chronic microvascular type changes.    BRAIN MRA  No evidence of flow-limiting stenosis, aneurysm or vessel malformation.    BRAIN MRV:  No dural venous sinus thrombosis or stenosis.    < end of copied text >

## 2022-10-20 NOTE — PROGRESS NOTE ADULT - SUBJECTIVE AND OBJECTIVE BOX
Nephrology progress note    THIS IS AN INCOMPLETE NOTE . FULL NOTE TO FOLLOW SHORTLY    Patient is seen and examined, events over the last 24 h noted .    Allergies:  Tegretol (Hives; Rash)    Hospital Medications:   MEDICATIONS  (STANDING):  acetaminophen     Tablet .. 650 milliGRAM(s) Oral every 6 hours  atorvastatin 10 milliGRAM(s) Oral at bedtime  DULoxetine 30 milliGRAM(s) Oral daily  heparin   Injectable 5000 Unit(s) SubCutaneous every 8 hours  levothyroxine 50 MICROGram(s) Oral daily  lidocaine   4% Patch 1 Patch Transdermal every 24 hours  losartan 100 milliGRAM(s) Oral daily  metoprolol tartrate 25 milliGRAM(s) Oral every 12 hours  NIFEdipine XL 30 milliGRAM(s) Oral daily  pantoprazole    Tablet 40 milliGRAM(s) Oral before breakfast  sodium chloride 3 Gram(s) Oral every 8 hours        VITALS:  T(F): 97 (10-20-22 @ 07:34), Max: 98 (10-19-22 @ 20:00)  HR: 78 (10-20-22 @ 08:00)  BP: 113/60 (10-20-22 @ 08:00)  RR: 16 (10-20-22 @ 08:00)  SpO2: 98% (10-20-22 @ 08:00)  Wt(kg): --    10-18 @ 07:  -  10-19 @ 07:00  --------------------------------------------------------  IN: 650 mL / OUT: 1700 mL / NET: -1050 mL    10-19 @ 07:01  -  10-20 @ 07:00  --------------------------------------------------------  IN: 1173.3 mL / OUT: 2150 mL / NET: -976.7 mL    10 @ 07:01  -  10-20 @ 08:55  --------------------------------------------------------  IN: 166.6 mL / OUT: 125 mL / NET: 41.6 mL          PHYSICAL EXAM:  Constitutional: NAD  HEENT: anicteric sclera, oropharynx clear, MMM  Neck: No JVD  Respiratory: CTAB, no wheezes, rales or rhonchi  Cardiovascular: S1, S2, RRR  Gastrointestinal: BS+, soft, NT/ND  Extremities: No cyanosis or clubbing. No peripheral edema  :  No cantrell.   Skin: No rashes    LABS:  10-20    127<L>  |  90<L>  |  9<L>  ----------------------------<  97  3.8   |  28  |  <0.5<L>  SODIUM TREND:  Sodium 127 [10-20 @ 05:26]  Sodium 135 [10-19 @ 22:53]  Sodium 124 [10-19 @ 18:34]  Sodium 124 [10-19 @ 11:18]  Sodium 126 [10-19 @ 05:34]  Sodium 127 [10-18 @ 23:43]  Sodium 131 [10-18 @ 11:39]  Sodium 132 [10-18 @ 05:33]  Sodium 135 [10-17 @ 22:37]  Sodium 135 [10-17 @ 11:38]    Ca    8.3<L>      20 Oct 2022 05:26  Phos  3.3     10-19  Mg     1.9     10-19                            10.1   2.04  )-----------( 89       ( 19 Oct 2022 22:53 )             29.0       Urine Studies:  Urinalysis Basic - ( 16 Oct 2022 16:10 )    Color: Yellow / Appearance: Slightly Turbid / S.029 / pH:   Gluc:  / Ketone: Moderate  / Bili: Negative / Urobili: 3 mg/dL   Blood:  / Protein: 100 mg/dL / Nitrite: Negative   Leuk Esterase: Negative / RBC: 55 /HPF / WBC 6 /HPF   Sq Epi:  / Non Sq Epi: 0 /HPF / Bacteria: Negative      Sodium, Random Urine: 179.0 mmoL/L (10-19 @ 10:20)  Osmolality, Random Urine: 495 mos/kg (10-19 @ 10:20)  Osmolality, Random Urine: 384 mos/kg (10-16 @ 16:10)  Sodium, Random Urine: 100.0 mmoL/L (10- @ 16:10)              RADIOLOGY & ADDITIONAL STUDIES:   Nephrology progress note  Patient is seen and examined, events over the last 24 h noted .  lying in bed comfortable     Allergies:  Tegretol (Hives; Rash)    Hospital Medications:   MEDICATIONS  (STANDING):    acetaminophen     Tablet .. 650 milliGRAM(s) Oral every 6 hours  atorvastatin 10 milliGRAM(s) Oral at bedtime  DULoxetine 30 milliGRAM(s) Oral daily  heparin   Injectable 5000 Unit(s) SubCutaneous every 8 hours  levothyroxine 50 MICROGram(s) Oral daily  lidocaine   4% Patch 1 Patch Transdermal every 24 hours  losartan 100 milliGRAM(s) Oral daily  metoprolol tartrate 25 milliGRAM(s) Oral every 12 hours  NIFEdipine XL 30 milliGRAM(s) Oral daily  pantoprazole    Tablet 40 milliGRAM(s) Oral before breakfast  sodium chloride 3 Gram(s) Oral every 8 hours        VITALS:  T(F): 97 (10-20-22 @ 07:34), Max: 98 (10-19-22 @ 20:00)  HR: 78 (10-20-22 @ 08:00)  BP: 113/60 (10-20-22 @ 08:00)  RR: 16 (10-20-22 @ 08:00)  SpO2: 98% (10-20-22 @ 08:00)      10-18 @ 07:  -  10-19 @ 07:00  --------------------------------------------------------  IN: 650 mL / OUT: 1700 mL / NET: -1050 mL    10-19 @ 07:01  -  10-20 @ 07:00  --------------------------------------------------------  IN: 1173.3 mL / OUT: 2150 mL / NET: -976.7 mL    10-20 @ 07:01  -  10-20 @ 08:55  --------------------------------------------------------  IN: 166.6 mL / OUT: 125 mL / NET: 41.6 mL          PHYSICAL EXAM:  Constitutional: NAD  Neck: No JVD  Respiratory: CTAB  Cardiovascular: S1, S2, RRR  Gastrointestinal: BS+, soft, NT/ND  Extremities: No cyanosis or clubbing. No peripheral edema  :  No cantrell.   Skin: No rashes    LABS:    10-20    127<L>  |  90<L>  |  9<L>  ----------------------------<  97  3.8   |  28  |  <0.5<L>    SODIUM TREND:  Sodium 127 [10-20 @ 05:26]  Sodium 135 [10-19 @ 22:53]  Sodium 124 [10-19 @ 18:34]  Sodium 124 [10-19 @ 11:18]  Sodium 126 [10-19 @ 05:34]  Sodium 127 [10-18 @ 23:43]  Sodium 131 [10-18 @ 11:39]  Sodium 132 [10-18 @ 05:33]  Sodium 135 [10-17 @ 22:37]  Sodium 135 [10-17 @ 11:38]    Ca    8.3<L>      20 Oct 2022 05:26  Phos  3.3     10-19  Mg     1.9     10-19                            10.1   2.04  )-----------( 89       ( 19 Oct 2022 22:53 )             29.0       Urine Studies:  Urinalysis Basic - ( 16 Oct 2022 16:10 )    Color: Yellow / Appearance: Slightly Turbid / S.029 / pH:   Gluc:  / Ketone: Moderate  / Bili: Negative / Urobili: 3 mg/dL   Blood:  / Protein: 100 mg/dL / Nitrite: Negative   Leuk Esterase: Negative / RBC: 55 /HPF / WBC 6 /HPF   Sq Epi:  / Non Sq Epi: 0 /HPF / Bacteria: Negative      Sodium, Random Urine: 179.0 mmoL/L (10-19 @ 10:20)  Osmolality, Random Urine: 495 mos/kg (10-19 @ 10:20)  Osmolality, Random Urine: 384 mos/kg (10-16 @ 16:10)  Sodium, Random Urine: 100.0 mmoL/L (10-16 @ 16:10)              RADIOLOGY & ADDITIONAL STUDIES:

## 2022-10-20 NOTE — PROGRESS NOTE ADULT - SUBJECTIVE AND OBJECTIVE BOX
JANA ESCOBEDO  [id number]  87y Female    Indication for ICU admission:    Admit Date:10-15-22  ICU Date: 10-15-22  OR Date:     Tegretol (Hives; Rash)    PAST MEDICAL & SURGICAL HISTORY:  Atrial fibrillation  Hypertension  Hypothyroid  Anxiety  H/O colonoscopy - 10 yrs ago  History of cholecystectomy      Home Medications:  atorvastatin 10 mg oral tablet: 1 tab(s) orally once a day (15 Oct 2022 14:38)  Cymbalta 30 mg oral delayed release capsule: 1 cap(s) orally 2 times a day (15 Oct 2022 14:38)  levothyroxine 50 mcg (0.05 mg) oral tablet: 1 tab(s) orally once a day (20 Nov 2018 11:17)  losartan-hydrochlorothiazide 100 mg-12.5 mg oral tablet: 1 tab(s) orally once a day (15 Oct 2022 14:37)  metoprolol tartrate 25 mg oral tablet: orally once a day (20 Nov 2018 11:17)  Myrbetriq 50 mg oral tablet, extended release: 1 tab(s) orally once a day (15 Oct 2022 14:37)  Trileptal 150 mg oral tablet: 1 tab(s) orally 2 times a day (15 Oct 2022 14:39)  warfarin 5 mg oral tablet: 1 tab(s) orally once a day (20 Nov 2018 11:17)      24HRS EVENT:  10/19  Night  -repeat Na 124 --> 135  -as per nephro take pt off the NS - discontined  -Started nifedipine 30 XL QD for HTN  -mag repleted  -AM BMP    Day  - DC HCTZ, increase salt tabs to 3g q8  -NS 75/hr   -sent urine osm and sodium, f/u--> , urine Na 179  -MRI completed, f/u results with neurointervention- waiting for final read will follow up  -renal c/s to follow for hyponatremia- Hyponatremia likely secondary to HCTZ and trileptal, possible SIADH component; Continue to hold HCTZ and oxcarbazepine, c/w NS at 75 cc/hr, Fluid restriction, Check TSH and uric acid, d/c salt tablets  -repeat UA neg   -hospitalist consult  -cancelling DG until sodium improves   ---> 124-->124  -MR head: trace SAH  -Per Nephro: if NA decreased stop NS if increasing continue saline. if same stop NS.     DVT PTX: hsq    GI PTX:pantoprazole    Tablet 40 milliGRAM(s) Oral before breakfast      ***Tubes/Lines/Drains  ***  Peripheral IV      REVIEW OF SYSTEMS    [x] A ten-point review of systems was otherwise negative except as noted.  [ ] Due to altered mental status/intubation, subjective information were not able to be obtained from the patient. History was obtained, to the extent possible, from review of the chart and collateral sources of information.         JANA ESCOBEDO  [id number]  87y Female    Indication for ICU admission:    Admit Date:10-15-22  ICU Date: 10-15-22  OR Date:     Tegretol (Hives; Rash)    PAST MEDICAL & SURGICAL HISTORY:  Atrial fibrillation  Hypertension  Hypothyroid  Anxiety  H/O colonoscopy - 10 yrs ago  History of cholecystectomy      Home Medications:  atorvastatin 10 mg oral tablet: 1 tab(s) orally once a day (15 Oct 2022 14:38)  Cymbalta 30 mg oral delayed release capsule: 1 cap(s) orally 2 times a day (15 Oct 2022 14:38)  levothyroxine 50 mcg (0.05 mg) oral tablet: 1 tab(s) orally once a day (20 Nov 2018 11:17)  losartan-hydrochlorothiazide 100 mg-12.5 mg oral tablet: 1 tab(s) orally once a day (15 Oct 2022 14:37)  metoprolol tartrate 25 mg oral tablet: orally once a day (20 Nov 2018 11:17)  Myrbetriq 50 mg oral tablet, extended release: 1 tab(s) orally once a day (15 Oct 2022 14:37)  Trileptal 150 mg oral tablet: 1 tab(s) orally 2 times a day (15 Oct 2022 14:39)  warfarin 5 mg oral tablet: 1 tab(s) orally once a day (20 Nov 2018 11:17)      24HRS EVENT:  10/19  Night  -repeat Na 124 --> 135  -as per nephro take pt off the NS - discontined  -Started nifedipine 30 XL QD for HTN  -mag repleted  -AM BMP    Day  - DC HCTZ, increase salt tabs to 3g q8  -NS 75/hr   -sent urine osm and sodium, f/u--> , urine Na 179  -MRI completed, f/u results with neurointervention- waiting for final read will follow up  -renal c/s to follow for hyponatremia- Hyponatremia likely secondary to HCTZ and trileptal, possible SIADH component; Continue to hold HCTZ and oxcarbazepine, c/w NS at 75 cc/hr, Fluid restriction, Check TSH and uric acid, d/c salt tablets  -repeat UA neg   -hospitalist consult  -cancelling DG until sodium improves   ---> 124-->124  -MR head: trace SAH  -Per Nephro: if NA decreased stop NS if increasing continue saline. if same stop NS.     DVT PTX: hsq    GI PTX:pantoprazole    Tablet 40 milliGRAM(s) Oral before breakfast      ***Tubes/Lines/Drains  ***  Peripheral IV      REVIEW OF SYSTEMS    [x] A ten-point review of systems was otherwise negative except as noted.  [ ] Due to altered mental status/intubation, subjective information were not able to be obtained from the patient. History was obtained, to the extent possible, from review of the chart and collateral sources of information.    Daily     Daily   Diet, Regular (10-18-22 @ 19:49)    CURRENT MEDS:  Neurologic Medications  acetaminophen     Tablet .. 650 milliGRAM(s) Oral every 6 hours  DULoxetine 30 milliGRAM(s) Oral daily    Respiratory Medications    Cardiovascular Medications  losartan 100 milliGRAM(s) Oral daily  metoprolol tartrate 25 milliGRAM(s) Oral every 12 hours  NIFEdipine XL 30 milliGRAM(s) Oral daily    Gastrointestinal Medications  pantoprazole    Tablet 40 milliGRAM(s) Oral before breakfast  sodium chloride 3 Gram(s) Oral every 8 hours    Genitourinary Medications    Hematologic/Oncologic Medications  heparin   Injectable 5000 Unit(s) SubCutaneous every 8 hours    Antimicrobial/Immunologic Medications    Endocrine/Metabolic Medications  atorvastatin 10 milliGRAM(s) Oral at bedtime  levothyroxine 50 MICROGram(s) Oral daily    Topical/Other Medications  lidocaine   4% Patch 1 Patch Transdermal every 24 hours    ICU Vital Signs Last 24 Hrs  T(C): 36.1 (20 Oct 2022 07:34), Max: 36.7 (19 Oct 2022 20:00)  T(F): 97 (20 Oct 2022 07:34), Max: 98 (19 Oct 2022 20:00)  HR: 80 (20 Oct 2022 10:00) (75 - 107)  BP: 123/58 (20 Oct 2022 10:00) (111/55 - 205/97)  BP(mean): 84 (20 Oct 2022 10:00) (76 - 140)  RR: 16 (20 Oct 2022 10:00) (16 - 51)  SpO2: 99% (20 Oct 2022 10:00) (95% - 99%)    O2 Parameters below as of 20 Oct 2022 10:00  Patient On (Oxygen Delivery Method): room air        I&O's Summary    19 Oct 2022 07:01  -  20 Oct 2022 07:00  --------------------------------------------------------  IN: 1090 mL / OUT: 2150 mL / NET: -1060 mL    20 Oct 2022 07:01  -  20 Oct 2022 10:53  --------------------------------------------------------  IN: 120 mL / OUT: 350 mL / NET: -230 mL      I&O's Detail    19 Oct 2022 07:01  -  20 Oct 2022 07:00  --------------------------------------------------------  IN:    IV PiggyBack: 100 mL    Oral Fluid: 240 mL    sodium chloride 0.9%: 750 mL  Total IN: 1090 mL    OUT:    Voided (mL): 2150 mL  Total OUT: 2150 mL    Total NET: -1060 mL      20 Oct 2022 07:01  -  20 Oct 2022 10:53  --------------------------------------------------------  IN:    Oral Fluid: 120 mL  Total IN: 120 mL    OUT:    Voided (mL): 350 mL  Total OUT: 350 mL    Total NET: -230 mL      PHYSICAL EXAM:     a&ox3  follows commands, SHEEHAN  no acute distress  equal chest rise b/l  abdomen soft  extremities soft

## 2022-10-20 NOTE — DISCHARGE NOTE NURSING/CASE MANAGEMENT/SOCIAL WORK - WILL THE PATIENT ACCEPT THE PFIZER COVID-19 VACCINE IF ELIGIBLE AND IT IS AVAILABLE?
PAST MEDICAL HISTORY:  BPH (benign prostatic hyperplasia)     GERD (gastroesophageal reflux disease)     HTN (hypertension)     
No

## 2022-10-20 NOTE — PATIENT PROFILE ADULT - FALL HARM RISK - HARM RISK INTERVENTIONS

## 2022-10-20 NOTE — DISCHARGE NOTE PROVIDER - CARE PROVIDER_API CALL
Alba Chavira)  Neurosurgery  [address], [address]  [place] [address]  Phone: [phone]  Fax: [phone]  Follow Up Time: 2 weeks    Montana Delaney; [facility])  [facility] Neurosurgery Surgery  [address] [place] [address]  Phone: [phone]  Fax: [phone]  Follow Up Time: 2 weeks

## 2022-10-20 NOTE — H&P ADULT - NSHPSOCIALHISTORY_GEN_ALL_CORE
Lives on 1st floor of multilevel dwelling with daughter and HHA 8 x 5. Has a few steps to enter which family takes her in a wheelchair.   No Smoking since her 20s and denies alcohol use for years.

## 2022-10-20 NOTE — H&P ADULT - NSHPPHYSICALEXAM_GEN_ALL_CORE
PHYSICAL EXAMINATION   VItals: T(C): 36.1 (10-20-22 @ 12:25), Max: 36.7 (10-19-22 @ 20:00)  HR: 87 (10-20-22 @ 13:00) (75 - 107)  BP: 122/60 (10-20-22 @ 13:00) (111/55 - 205/97)  RR: 16 (10-20-22 @ 13:00) (16 - 51)  SpO2: 99% (10-20-22 @ 13:00) (95% - 99%)    General:[ x  ] NAD, Resting Comfortable,   [ x  ] other:  [place]                              HEENT: [ x  ] NC/AT, EOMI, PERRL , Normal Conjunctivae,   [   ] other:  Cardio: [ x  ] RRR, no murmer,   [   ] other:                              Pulm: [  x ] No Respiratory Distress,  Lungs CTAB,   [   ] other:                       Abdomen: [ x  ]ND/NT, Soft,   [   ] other:    : [  x ] NO KENNY CATHETER, [   ] KENNY CATHETER- no meatal tear, no discharge, [   ] other:                                            MSK: [ x  ] Full ROM,   [ x  ] other: chronic arthritic changes both knees. Atrophy of intrinsic muscles of hands                                      Ext: [  x ]No C/C/E, No calf tenderness,   [   ]other:    Skin: [ x  ]intact,   [   ] other:                                                                   Neurological Examination:  Cognitive: [    ] AAO x 3,   [   x ]  other:  oriented to month and year, not date. Knows President.                                                                Attention:  [  x  ] intact,   [    ]  other:                            Memory: [    ] intact,    [ x   ]  other:  poor immediate recall   Mood/Affect: [ x   ] wnl,    [    ]  other:                                                                             Communication: [  x  ]Fluent, no dysarthria, following commands:  [  x  ] other: limited by [place]. Follows 2 step commands  CN II - XII:  [ x   ] intact,  [    ] other:                                                                                        Motor:   RIGHT UE: [ x  ] WNL,  [   ] other:  LEFT    UE: [ x  ] WNL,  [   ] other:  RIGHT LE: [ x  ] WNL,  [   ] other:   LEFT    LE: [  x ] WNL,  [   ] other:    Tone: [  x  ] wnl,   [    ]  other:  DTRs: [ x  ]symmetric, [   ] other:  Coordination:   [ x   ] intact,   [    ] other:                                                                           Sensory: [  x  ] Intact to light touch,   [    ] other: PHYSICAL EXAMINATION   Vital Signs Last 24 Hrs  T(C): 36.1 (20 Oct 2022 12:25), Max: 36.7 (19 Oct 2022 20:00)  T(F): 97 (20 Oct 2022 12:25), Max: 98 (19 Oct 2022 20:00)  HR: 87 (20 Oct 2022 13:00) (75 - 107)  BP: 122/60 (20 Oct 2022 13:00) (111/55 - 205/97)  BP(mean): 84 (20 Oct 2022 13:00) (76 - 140)  RR: 16 (20 Oct 2022 13:00) (16 - 51)  SpO2: 99% (20 Oct 2022 13:00) (95% - 99%)      General:[ x  ] NAD, Resting Comfortable,   [ x  ] other:  [place]                              HEENT: [ x  ] NC/AT, EOMI, PERRL , Normal Conjunctivae,   [   ] other:  Cardio: [ x  ] RRR, no murmer,   [   ] other:                              Pulm: [  x ] No Respiratory Distress,  Lungs CTAB,   [   ] other:                       Abdomen: [ x  ]ND/NT, Soft,   [   ] other:    : [  x ] NO KENNY CATHETER, [   ] KENNY CATHETER- no meatal tear, no discharge, [   ] other:                                            MSK: [ x  ] Full ROM,   [ x  ] other: chronic arthritic changes both knees. Atrophy of intrinsic muscles of hands                                      Ext: [  x ]No C/C/E, No calf tenderness,   [   ]other:    Skin: [ x  ]intact,   [   ] other:                                                                   Neurological Examination:  Cognitive: [    ] AAO x 3,   [   x ]  other:  oriented to month and year, not date. Knows President.                                                                Attention:  [  x  ] intact,   [    ]  other:                            Memory: [    ] intact,    [ x   ]  other:  poor immediate recall   Mood/Affect: [ x   ] wnl,    [    ]  other:                                                                             Communication: [  x  ]Fluent, no dysarthria, following commands:  [  x  ] other: limited by [place]. Follows 2 step commands  CN II - XII:  [ x   ] intact,  [    ] other:                                                                                        Motor:   RIGHT UE: [ x  ] WNL,  [   ] other:  LEFT    UE: [ x  ] WNL,  [   ] other:  RIGHT LE: [ x  ] WNL,  [   ] other:   LEFT    LE: [  x ] WNL,  [   ] other:    Tone: [  x  ] wnl,   [    ]  other:  DTRs: [ x  ]symmetric, [   ] other:  Coordination:   [ x   ] intact,   [    ] other:                                                                           Sensory: [  x  ] Intact to light touch, burning dysesthesias both feet     [    ] other:

## 2022-10-20 NOTE — PROGRESS NOTE ADULT - REASON FOR ADMISSION
S/P Fall, subarachnoid hemorrhage, multiple rib fracture

## 2022-10-20 NOTE — DISCHARGE NOTE NURSING/CASE MANAGEMENT/SOCIAL WORK - NSDCPEFALRISK_GEN_ALL_CORE
For information on Fall & Injury Prevention, visit: [email or web address] OR  [email or web address] OR  https://www.cdc.gov/steadi/patient.html

## 2022-10-20 NOTE — DISCHARGE NOTE NURSING/CASE MANAGEMENT/SOCIAL WORK - REASON FOR REFUSAL (REFER PATIENT TO HEALTHCARE PROVIDER FOR FOLLOW-UP):
Pt stating she had a bad reaction after taking flu vaccine about 10 years ago. Pt refusing to take since then.

## 2022-10-20 NOTE — PROGRESS NOTE ADULT - ASSESSMENT
87y Female with PMH of HTN. HLD, Hypothyroid disease, Afib (on warfarin- last dose last night), mild myelodysplastic syndrome (on Vizada Q 28 days- last treatment 1 week ago), Trigeminal Neuralgia ( on Trileptal, prior gamma knife procedure), B/L knee Osteoarthritis- walker device dependent was brought in by EMS after an unwitnessed fall at home. Imaging shows small acute SAH within the right quadrigeminal plate cistern and right 7-10 rib fxs. Nephro eval for hyponatremia.    Euvolemic hyponatremia - high urine osm c/w high ADH state, aggravated by thiazide diuretic  - cont holding thiazides today   - Na improving slowly ( noticed one reading of 135 ? true reading)  - cont NaCl tabs  - limit fluid intake to 1 liter daily  - check thyroid function- TSH   - monitor Na level q12h .    will follow

## 2022-10-20 NOTE — H&P ADULT - NSHPREVIEWOFSYSTEMS_GEN_ALL_CORE
Constiutional:    [ x  ] WNL           [   ] poor appetite   [   ] insomnia   [   ] tired   ENT                     [    ]                   [ x ]  [place]   Cardio:                [ x  ] WNL           [   ] CP   [   ] BROWN   [   ] palpitations               Resp:                   [ x  ] WNL           [   ] SOB   [   ] cough   [   ] wheezing   GI:                        [   ] WNL           [ x  ] constipation - reports no BM since admission  [   ] diarrhea   [   ] abdominal pain   [   ] nausea   [   ] emesis                                :                      [ x  ] WNL           [   ] KENNY  [   ] dysuria   [   ] difficulty voiding             Endo:                   [ x  ] WNL          [   ] polyuria   [   ] temperature intolerance                 Skin:                     [  x ] WNL          [   ] pain   [   ] wound   [   ] rash   MSK:                    [   ] WNL          [   ] muscle pain   [ x  ] joint pain/ stiffness both knees   [   ] muscle tenderness   [   ] swelling   Neuro:                 [   ] WNL          [   ] HA   [   ] change in vision   [   ] tremor   [  x ] weakness   [   ]dysphagia  [ x ] memory loss            Cognitive:           [   ] WNL           [ x  ]confusion      Psych:                  [   ] WNL           [   ] hallucinations   [   ]agitation   [   ] delusion   [   ]depression  [ x ]  anxiety

## 2022-10-20 NOTE — CHART NOTE - NSCHARTNOTEFT_GEN_A_CORE
Called by pharmacy about the Coumadin order. No recent PT/PTT on the chart. Chart reviewed and no contraindication to A/C noted  As per Neuro Note:  "On exam, the patient exhibits good recall of events leading up to fall/ after fall, and denies meningeal symptoms. All relevant imaging and clinical history were reviewed by Dr. Menjivar and an MRI w/ wout contrast, MRA, and MRV w/ wout contrast were requested. It is reported that the previously observed SAH likely represents vessel calcification, and on other acute intracranial pathology was identified.    Suggestions:  - No acute neuroendovascular intervention is warranted at this time.   - Please reconsult the neuroendovascular team with additional questions/ concerns on this admission. Will sign out."  A/p  H/o Chronic A. Fib   -will draw PT/PTT and CBC and follow up results  -case c/w Nursing     Spectra [phone]

## 2022-10-21 LAB
ALBUMIN SERPL ELPH-MCNC: 3.5 G/DL — SIGNIFICANT CHANGE UP (ref 3.5–5.2)
ALP SERPL-CCNC: 115 U/L — SIGNIFICANT CHANGE UP (ref 30–115)
ALT FLD-CCNC: 17 U/L — SIGNIFICANT CHANGE UP (ref 0–41)
ANION GAP SERPL CALC-SCNC: 9 MMOL/L — SIGNIFICANT CHANGE UP (ref 7–14)
AST SERPL-CCNC: 20 U/L — SIGNIFICANT CHANGE UP (ref 0–41)
BASOPHILS # BLD AUTO: 0.02 K/UL — SIGNIFICANT CHANGE UP (ref 0–0.2)
BASOPHILS NFR BLD AUTO: 1.3 % — HIGH (ref 0–1)
BILIRUB SERPL-MCNC: 0.6 MG/DL — SIGNIFICANT CHANGE UP (ref 0.2–1.2)
BUN SERPL-MCNC: 12 MG/DL — SIGNIFICANT CHANGE UP (ref 10–20)
CALCIUM SERPL-MCNC: 8.5 MG/DL — SIGNIFICANT CHANGE UP (ref 8.4–10.5)
CHLORIDE SERPL-SCNC: 98 MMOL/L — SIGNIFICANT CHANGE UP (ref 98–110)
CO2 SERPL-SCNC: 27 MMOL/L — SIGNIFICANT CHANGE UP (ref 17–32)
CREAT SERPL-MCNC: <0.5 MG/DL — LOW (ref 0.7–1.5)
EGFR: 97 ML/MIN/1.73M2 — SIGNIFICANT CHANGE UP
EOSINOPHIL # BLD AUTO: 0.11 K/UL — SIGNIFICANT CHANGE UP (ref 0–0.7)
EOSINOPHIL NFR BLD AUTO: 6.9 % — SIGNIFICANT CHANGE UP (ref 0–8)
GLUCOSE BLDC GLUCOMTR-MCNC: 108 MG/DL — HIGH (ref 70–99)
GLUCOSE SERPL-MCNC: 102 MG/DL — HIGH (ref 70–99)
HCT VFR BLD CALC: 29.8 % — LOW (ref 37–47)
HGB BLD-MCNC: 10.5 G/DL — LOW (ref 12–16)
IMM GRANULOCYTES NFR BLD AUTO: 0.6 % — HIGH (ref 0.1–0.3)
INR BLD: 1.08 RATIO — SIGNIFICANT CHANGE UP (ref 0.65–1.3)
LYMPHOCYTES # BLD AUTO: 0.91 K/UL — LOW (ref 1.2–3.4)
LYMPHOCYTES # BLD AUTO: 57.2 % — HIGH (ref 20.5–51.1)
MAGNESIUM SERPL-MCNC: 1.6 MG/DL — LOW (ref 1.8–2.4)
MCHC RBC-ENTMCNC: 34.3 PG — HIGH (ref 27–31)
MCHC RBC-ENTMCNC: 35.2 G/DL — SIGNIFICANT CHANGE UP (ref 32–37)
MCV RBC AUTO: 97.4 FL — SIGNIFICANT CHANGE UP (ref 81–99)
MONOCYTES # BLD AUTO: 0.05 K/UL — LOW (ref 0.1–0.6)
MONOCYTES NFR BLD AUTO: 3.1 % — SIGNIFICANT CHANGE UP (ref 1.7–9.3)
NEUTROPHILS # BLD AUTO: 0.49 K/UL — LOW (ref 1.4–6.5)
NEUTROPHILS NFR BLD AUTO: 30.9 % — LOW (ref 42.2–75.2)
NRBC # BLD: 0 /100 WBCS — SIGNIFICANT CHANGE UP (ref 0–0)
PLATELET # BLD AUTO: 104 K/UL — LOW (ref 130–400)
POTASSIUM SERPL-MCNC: 4.2 MMOL/L — SIGNIFICANT CHANGE UP (ref 3.5–5)
POTASSIUM SERPL-SCNC: 4.2 MMOL/L — SIGNIFICANT CHANGE UP (ref 3.5–5)
PROT SERPL-MCNC: 5.6 G/DL — LOW (ref 6–8)
PROTHROM AB SERPL-ACNC: 12.3 SEC — SIGNIFICANT CHANGE UP (ref 9.95–12.87)
RBC # BLD: 3.06 M/UL — LOW (ref 4.2–5.4)
RBC # FLD: 14.8 % — HIGH (ref 11.5–14.5)
SODIUM SERPL-SCNC: 134 MMOL/L — LOW (ref 135–146)
WBC # BLD: 1.59 K/UL — LOW (ref 4.8–10.8)
WBC # FLD AUTO: 1.59 K/UL — LOW (ref 4.8–10.8)

## 2022-10-21 RX ORDER — WARFARIN SODIUM 2.5 MG/1
5 TABLET ORAL ONCE
Refills: 0 | Status: COMPLETED | OUTPATIENT
Start: 2022-10-21 | End: 2022-10-21

## 2022-10-21 RX ORDER — GLYCERIN ADULT
1 SUPPOSITORY, RECTAL RECTAL ONCE
Refills: 0 | Status: DISCONTINUED | OUTPATIENT
Start: 2022-10-21 | End: 2022-11-02

## 2022-10-21 RX ORDER — HEPARIN SODIUM 5000 [USP'U]/ML
5000 INJECTION INTRAVENOUS; SUBCUTANEOUS EVERY 8 HOURS
Refills: 0 | Status: DISCONTINUED | OUTPATIENT
Start: 2022-10-21 | End: 2022-10-22

## 2022-10-21 RX ADMIN — DULOXETINE HYDROCHLORIDE 30 MILLIGRAM(S): 30 CAPSULE, DELAYED RELEASE ORAL at 17:45

## 2022-10-21 RX ADMIN — WARFARIN SODIUM 5 MILLIGRAM(S): 2.5 TABLET ORAL at 00:12

## 2022-10-21 RX ADMIN — Medication 30 MILLIGRAM(S): at 06:26

## 2022-10-21 RX ADMIN — Medication 650 MILLIGRAM(S): at 06:26

## 2022-10-21 RX ADMIN — ATORVASTATIN CALCIUM 10 MILLIGRAM(S): 80 TABLET, FILM COATED ORAL at 22:00

## 2022-10-21 RX ADMIN — Medication 5 MILLIGRAM(S): at 17:43

## 2022-10-21 RX ADMIN — WARFARIN SODIUM 5 MILLIGRAM(S): 2.5 TABLET ORAL at 22:04

## 2022-10-21 RX ADMIN — HEPARIN SODIUM 5000 UNIT(S): 5000 INJECTION INTRAVENOUS; SUBCUTANEOUS at 22:01

## 2022-10-21 RX ADMIN — Medication 650 MILLIGRAM(S): at 23:37

## 2022-10-21 RX ADMIN — Medication 650 MILLIGRAM(S): at 17:44

## 2022-10-21 RX ADMIN — SODIUM CHLORIDE 3 GRAM(S): 9 INJECTION INTRAMUSCULAR; INTRAVENOUS; SUBCUTANEOUS at 06:26

## 2022-10-21 RX ADMIN — Medication 5 MILLIGRAM(S): at 06:25

## 2022-10-21 RX ADMIN — SODIUM CHLORIDE 3 GRAM(S): 9 INJECTION INTRAMUSCULAR; INTRAVENOUS; SUBCUTANEOUS at 22:03

## 2022-10-21 RX ADMIN — PANTOPRAZOLE SODIUM 40 MILLIGRAM(S): 20 TABLET, DELAYED RELEASE ORAL at 06:26

## 2022-10-21 RX ADMIN — Medication 650 MILLIGRAM(S): at 12:06

## 2022-10-21 RX ADMIN — LIDOCAINE 1 PATCH: 4 CREAM TOPICAL at 22:01

## 2022-10-21 RX ADMIN — Medication 25 MILLIGRAM(S): at 06:26

## 2022-10-21 RX ADMIN — SODIUM CHLORIDE 3 GRAM(S): 9 INJECTION INTRAMUSCULAR; INTRAVENOUS; SUBCUTANEOUS at 16:11

## 2022-10-21 RX ADMIN — DULOXETINE HYDROCHLORIDE 30 MILLIGRAM(S): 30 CAPSULE, DELAYED RELEASE ORAL at 06:25

## 2022-10-21 RX ADMIN — HEPARIN SODIUM 5000 UNIT(S): 5000 INJECTION INTRAVENOUS; SUBCUTANEOUS at 14:24

## 2022-10-21 RX ADMIN — LOSARTAN POTASSIUM 100 MILLIGRAM(S): 100 TABLET, FILM COATED ORAL at 06:25

## 2022-10-21 RX ADMIN — Medication 50 MICROGRAM(S): at 06:25

## 2022-10-21 NOTE — PROGRESS NOTE ADULT - ASSESSMENT
87y Female with PMH of HTN. HLD, Hypothyroid disease, Afib (on warfarin- last dose last night), mild myelodysplastic syndrome (on Vizada Q 28 days- last treatment 1 week ago), Trigeminal Neuralgia ( on Trileptal, prior gamma knife procedure), B/L knee Osteoarthritis- walker device dependent was brought in by EMS after an unwitnessed fall at home. Imaging shows small acute SAH within the right quadrigeminal plate cistern and right 7-10 rib fxs. Nephro eval for hyponatremia.    Euvolemic hyponatremia - high urine osm c/w high ADH state, aggravated by thiazide diuretic  - cont holding thiazides today   - Na improving at target now  - continue salt tablet   - limit fluid intake to 1 liter daily  - TSH within target   - monitor Na level q 48-72 h    will follow    87y Female with PMH of HTN. HLD, Hypothyroid disease, Afib (on warfarin- last dose last night), mild myelodysplastic syndrome (on Vizada Q 28 days- last treatment 1 week ago), Trigeminal Neuralgia ( on Trileptal, prior gamma knife procedure), B/L knee Osteoarthritis- walker device dependent was brought in by EMS after an unwitnessed fall at home. Imaging shows small acute SAH within the right quadrigeminal plate cistern and right 7-10 rib fxs. Nephro eval for hyponatremia.    Euvolemic hyponatremia - high urine osm c/w high ADH state, aggravated by thiazide diuretic  - cont holding thiazides today   - Na improving at target now  - continue salt tablet   - limit fluid intake to 1 liter daily  - TSH within target   - monitor Na level q 48-72 h    will sign off recall PRN / call using TEAMS or on [phone]

## 2022-10-21 NOTE — PROGRESS NOTE ADULT - ASSESSMENT
ASSESSMENT/PLAN    #Rehab of decline in mobility and cognition, s/p fall c/w Metabolic Encephalopathy from Hyponatremia. She also has multiple rib fractures 7- 10 on the right with pain.   She notes confusion and weakness prior to her fall and neuro w/u was c/w vascular calcification (not SAH), and moderate generalized microvascular ischemia. She also has severe OA of both knees, further limiting her mobility and Peripheral Neuropathy of unclear etiology. She requires close medical supervision because of her on going severe hyponatremia currently being treated with fluid restriction and Sodium tablets. She requires and can tolerate 3 hrs a day of intensive interdisciplinary rehab including PT, OT and ST and Neuoropsych eval.     #Hyponatremia  - improving slowly since admission, s/p IV saline, on fluid restriction and NaCl tabs 3 gms. tid.   - was on HCTZ on admission, stopped  - followed by Renal Medicine  - monitor daily  - 127>125>134    #Peripheral Neuropathy  - dysesthesias of feet and atrophy of intrinsic muscles of hands  - unknown etiology  - was on Trileptal at home and now on Cymbalta    #History of Chronic Atrial Fibrillation  - rate controlled. Currently RRR  - Coumadin held prior to admission to rehab, will resume   - adjust coumadin according to daily INR     #History of Myelodysplasia  - stable pancytopenia  - followed by Heme/Onc.   - On Vidaza every 28 days    #Osteoarthritis  - pain meds, modalities, ther-ex    #Chronic Microvascular Cerebrovascular Disease  - monitor    #HTN  - controlled on current meds    #HLD  - on statin    #[place]  - Pocket Talker  - consider outpatient Audiology eval    #Hypothyroidism  - on synthroid    #Pain control:   - rib fracture  - neuropathy pain  - Tylenol prn, Cymbalta    #GI/Bowel Mgmt: Contipation in hospital  - continue bowel meds and monitor    - Diet: Regular with salt tabs           Precautions / PROPHYLAXIS:      - Falls    - Ortho: Weight bearing status: WBAT      - DVT prophylaxis: Lovenox     ASSESSMENT/PLAN    #Rehab of decline in mobility and cognition, s/p fall c/w Metabolic Encephalopathy from Hyponatremia. She also has multiple rib fractures 7- 10 on the right with pain.   She notes confusion and weakness prior to her fall and neuro w/u was c/w vascular calcification (not SAH), and moderate generalized microvascular ischemia. She also has severe OA of both knees, further limiting her mobility and Peripheral Neuropathy of unclear etiology. She requires close medical supervision because of her on going severe hyponatremia currently being treated with fluid restriction and Sodium tablets. She requires and can tolerate 3 hrs a day of intensive interdisciplinary rehab including PT, OT and ST and Neuoropsych eval.     #Hyponatremia  - improving since admission, s/p IV saline, on fluid restriction and NaCl tabs 3 gms. tid.   - was on HCTZ on admission, stopped  - followed by Renal Medicine  - monitor daily  - 127>125>134    #Peripheral Neuropathy  - dysesthesias of feet and atrophy of intrinsic muscles of hands  - unknown etiology  - was on Trileptal at home and now on Cymbalta    #History of Chronic Atrial Fibrillation  - rate controlled. Currently RRR  - Coumadin held prior to admission to rehab secondary to suspision of SAH, now ruled out  - Resume coumadin home dose 5 mg daily and adjust for INR 2-3  - adjust coumadin according to daily INR     #History of Myelodysplasia  - stable pancytopenia  - followed by Heme/Onc.   - On Vidaza every 28 days    #Osteoarthritis  - pain meds, modalities, ther-ex    #Chronic Microvascular Cerebrovascular Disease  - monitor    #HTN  - controlled on current meds    #HLD  - on statin    #[place]  - Pocket Talker  - consider outpatient Audiology eval    #Hypothyroidism  - on synthroid    #Pain control:   - rib fracture  - neuropathy pain  - Tylenol prn, Cymbalta    #GI/Bowel Mgmt: Contipation in hospital  - continue bowel meds and monitor    - Diet: Regular with salt tabs         Precautions / PROPHYLAXIS:      - Falls    - Ortho: Weight bearing status: WBAT      - DVT prophylaxis: coumadin (d/c Lovenox when INR therapeutic)

## 2022-10-21 NOTE — PROGRESS NOTE ADULT - SUBJECTIVE AND OBJECTIVE BOX
Patient is a 87y old  Female who presents with a chief complaint of Metabolic Encephalopathy with decline in function (21 Oct 2022 11:53)      HPI:  Patient is a 88 y/o female with PMHx of MDS ( on Vidaza- gets every 28 days- just finished course- due in around 3 weeks) HTN, HLD, A-Fib ( On Coumadin 5mg daily), hypothyroid, neuropathy of B/L LE, Trigeminal Neuralgia ( Prior Gamma knife procedure for pain), B/L OA of the knees, prior CCY who presented to [facility] s/p fall at home. Patient was ambulating at home with the walker when she fell onto the right side. She doesn't have the best recollection of the event but denies LOC. She has a home health aide and her grandson was also home at the time. She was able to stand and walk but presented as notes ongoing dizziness and pain over her right chest. Pain over the right side is dull, worse with motion, but not sharp. She notes dizziness notable after fall but not prior. With the dizziness she gets occasional nausea. Prior to fall she denies any change in medications, new medications, recent illness, nor any other features out different from her baseline .  Patient noted on workup to have right sided fracture of the 7-10th right ribs without displacement. On Incentive she can do around 500 but without respiratory distress or hemodynamic instability. She was also noted on CT to have a small right sub arachnoid hemorrhage. Admitted to surgery and will have a SICU consult with possible SICU admission due to trauma burden. She was also noted to have a sodium of 120 and was on HCTZ. She has had a slow, improvement in her Na+ to 126 on fluid restriction and Na+ tabs. Neuro w/u revealed her SAH to be secondary to vascular calcification with a normal MRA and MRV and no further w/u recommended.     She is medically stable, but states that she has been confused and weak since about 1 week PTA. She requires CG/ min assist for transfers and CG for ambulation with a RW 40 feet only secondary to impaired endurance and mod assistance for LE dressing.   PTA, she live with her daughter and had a HHA 8 hrs. a day, 5 days a week. She reports being able to transfer and ambulate with a RW with supervision PTA and being able to dress herself. She was evaluated by me on the Trauma Service and was found to be a good acute inpatient rehab candidate.  (20 Oct 2022 13:56)      I examined the patient and reviewed the chart. There have been no significant changes since my history and physical except where documented below.    TODAY'S SUBJECTIVE & REVIEW OF SYMPTOMS:  Patient seen by rehab in AM at bedside. Patient endorses constipation. Otherwise no acute complaints. no overnight events. will give suppository after dinner tonight.        Constiutional:    [ x  ] WNL, [place]           [   ] poor appetite   [   ] insomnia   [   ] tired     Cardio:                [ x  ] WNL           [   ] CP   [   ] BROWN   [   ] palpitations               Resp:                   [  x ] WNL           [   ] SOB   [   ] cough   [   ] wheezing   GI:                        [   ] WNL           [x   ] constipation   [   ] diarrhea   [   ] abdominal pain   [   ] nausea   [   ] emesis                                :                      [x   ] WNL           [   ] KENNY  [   ] dusuria   [   ] difficulty voiding             Endo:                   [ x  ] WNL          [   ] po;yuria   [   ] temperature intolerance                 Skin:                     [ x  ] WNL          [   ] pain   [   ] wound   [   ] rash   MSK:                    [   ] WNL          [   ] muscle pain   [ x  ] joint pain/ stiffness   [   ] muscle tenderness   [   ] swelling   Neuro:                 [   ] WNL          [   ] HA   [   ] change in vision   [   ] tremor   [x   ] weakness   [   ]dysphagia              Cognitive:           [   ] WNL           [  x ]confusion      Psych:                  [   ] WNL           [   ] hallucinations   [   ]agitation   [   ] delusion   [   ]depression [ x] anxiety      PHYSICAL EXAM    Vital Signs Last 24 Hrs  T(C): 36.5 (21 Oct 2022 05:33), Max: 36.5 (21 Oct 2022 05:33)  T(F): 97.7 (21 Oct 2022 05:33), Max: 97.7 (21 Oct 2022 05:33)  HR: 100 (21 Oct 2022 05:33) (87 - 108)  BP: 112/73 (21 Oct 2022 05:33) (112/73 - 150/66)  BP(mean): 91 (20 Oct 2022 17:00) (84 - 97)  RR: 18 (21 Oct 2022 05:33) (16 - 18)  SpO2: 98% (20 Oct 2022 17:00) (97% - 99%)        Constitutional - [ x  ] NAD, Comfortable        [   ] other:  Chest - [ x   ] CTA     [   ] other:  Cardiovascular - [  x ] RRR, no murmer     [   ] other:  Abdomen - [ x  ] Soft, NT/ND      [   ] other:        -  KENNY CATHETER   [   ] YES  if yes: [   ] NO MEATAL TEAR OR DISCHARGE [   ] other:  Extremities - No C/C/E, No calf tenderness       [   ] other:  ROM - [ x  ] WFL     [   ] other: chronic arthritic changes both knees. Atrophy of intrinsic muscles of hands                                     Neurologic Exam -                 Cognitive - [   ]Awake, Alert, AAO to self, place, date, year, situation         [  x  ] other: oriented to month and year, not date. Knows President.       Communication - [ x  ]Fluent, No dysarthria       [   ] other:      Motor - No focal deficits                    Right UE -  [ x  ] WNL      [    ] other:                    Left UE -     [ x  ] WNL      [    ] other:                    Right LE -   [  x ] WNL       [    ] other:                    Left LE -      [x   ]WNL        [    ] other:      Sensory - [  x ] Intact to LT, burning dysesthesias both feet    [    ] other:          Reflexes - [ x  ] wnl/ symmetric     [   ] other:     Psychiatric - [ x  ]Mood stable, Affect WNL     [   ]other:     Skin - [  x ] intact      [   ] other      acetaminophen     Tablet .. 650 milliGRAM(s) Oral every 6 hours  atorvastatin 10 milliGRAM(s) Oral at bedtime  DULoxetine 30 milliGRAM(s) Oral every 12 hours  glycerin Suppository - Adult 1 Suppository(s) Rectal once  heparin   Injectable 5000 Unit(s) SubCutaneous every 8 hours  influenza  Vaccine (HIGH DOSE) 0.7 milliLiter(s) IntraMuscular once  levothyroxine 50 MICROGram(s) Oral daily  lidocaine   4% Patch 1 Patch Transdermal every 24 hours  losartan 100 milliGRAM(s) Oral daily  metoprolol tartrate 25 milliGRAM(s) Oral every 12 hours  NIFEdipine XL 30 milliGRAM(s) Oral daily  oxybutynin 5 milliGRAM(s) Oral every 12 hours  pantoprazole    Tablet 40 milliGRAM(s) Oral before breakfast  sodium chloride 3 Gram(s) Oral every 8 hours  warfarin 5 milliGRAM(s) Oral once      RECENT LABS/IMAGING                        10.5   1.59  )-----------( 104      ( 21 Oct 2022 07:16 )             29.8     10-21    134<L>  |  98  |  12  ----------------------------<  102<H>  4.2   |  27  |  <0.5<L>    Ca    8.5      21 Oct 2022 07:16  Phos  3.3     10-19  Mg     1.6     10-21    TPro  5.6<L>  /  Alb  3.5  /  TBili  0.6  /  DBili  x   /  AST  20  /  ALT  17  /  AlkPhos  115  10-21    PT/INR - ( 21 Oct 2022 07:16 )   PT: 12.30 sec;   INR: 1.08 ratio         PTT - ( 20 Oct 2022 23:08 )  PTT:44.2 sec      ASSESSMENT/PLAN:    Rehab of:    Patient requires 3 hrs daily of interdisciplinary inpatient rehab at least 5 days a week.     DVT prophylaxis:    PROBLEM LIST:     Patient is a 87y old  Female who presents with a chief complaint of Metabolic Encephalopathy with decline in function (21 Oct 2022 11:53)      HPI:  Patient is a 86 y/o female with PMHx of MDS ( on Vidaza- gets every 28 days- just finished course- due in around 3 weeks) HTN, HLD, A-Fib ( On Coumadin 5mg daily), hypothyroid, neuropathy of B/L LE, Trigeminal Neuralgia ( Prior Gamma knife procedure for pain), B/L OA of the knees, prior CCY who presented to [facility] s/p fall at home. Patient was ambulating at home with the walker when she fell onto the right side. She doesn't have the best recollection of the event but denies LOC. She has a home health aide and her grandson was also home at the time. She was able to stand and walk but presented as notes ongoing dizziness and pain over her right chest. Pain over the right side is dull, worse with motion, but not sharp. She notes dizziness notable after fall but not prior. With the dizziness she gets occasional nausea. Prior to fall she denies any change in medications, new medications, recent illness, nor any other features out different from her baseline .  Patient noted on workup to have right sided fracture of the 7-10th right ribs without displacement. On Incentive she can do around 500 but without respiratory distress or hemodynamic instability. She was also noted on CT to have a small right sub arachnoid hemorrhage. Admitted to surgery and will have a SICU consult with possible SICU admission due to trauma burden. She was also noted to have a sodium of 120 and was on HCTZ. She has had a slow, improvement in her Na+ to 126 on fluid restriction and Na+ tabs. Neuro w/u revealed her SAH to be secondary to vascular calcification with a normal MRA and MRV and no further w/u recommended.     She is medically stable, but states that she has been confused and weak since about 1 week PTA. She requires CG/ min assist for transfers and CG for ambulation with a RW 40 feet only secondary to impaired endurance and mod assistance for LE dressing.   PTA, she live with her daughter and had a HHA 8 hrs. a day, 5 days a week. She reports being able to transfer and ambulate with a RW with supervision PTA and being able to dress herself. She was evaluated by me on the Trauma Service and was found to be a good acute inpatient rehab candidate.  (20 Oct 2022 13:56)      I examined the patient and reviewed the chart. There have been no significant changes since my history and physical except where documented below.    TODAY'S SUBJECTIVE & REVIEW OF SYMPTOMS:  Patient seen by rehab in AM at bedside. Patient endorses constipation. Otherwise no acute complaints. no overnight events. will give suppository after dinner tonight.        Constiutional:    [ x  ] WNL, [place]           [   ] poor appetite   [   ] insomnia   [   ] tired     Cardio:                [ x  ] WNL           [   ] CP   [   ] BROWN   [   ] palpitations               Resp:                   [  x ] WNL           [   ] SOB   [   ] cough   [   ] wheezing   GI:                        [   ] WNL           [x   ] constipation   [   ] diarrhea   [   ] abdominal pain   [   ] nausea   [   ] emesis                                :                      [x   ] WNL           [   ] KENNY  [   ] dusuria   [   ] difficulty voiding             Endo:                   [ x  ] WNL          [   ] po;yuria   [   ] temperature intolerance                 Skin:                     [ x  ] WNL          [   ] pain   [   ] wound   [   ] rash   MSK:                    [   ] WNL          [   ] muscle pain   [ x  ] joint pain/ stiffness   [   ] muscle tenderness   [   ] swelling   Neuro:                 [   ] WNL          [   ] HA   [   ] change in vision   [   ] tremor   [x   ] weakness   [   ]dysphagia              Cognitive:           [   ] WNL           [  x ]confusion      Psych:                  [   ] WNL           [   ] hallucinations   [   ]agitation   [   ] delusion   [   ]depression [ x] anxiety      PHYSICAL EXAM    Vital Signs Last 24 Hrs  T(C): 36.5 (21 Oct 2022 05:33), Max: 36.5 (21 Oct 2022 05:33)  T(F): 97.7 (21 Oct 2022 05:33), Max: 97.7 (21 Oct 2022 05:33)  HR: 100 (21 Oct 2022 05:33) (87 - 108)  BP: 112/73 (21 Oct 2022 05:33) (112/73 - 150/66)  BP(mean): 91 (20 Oct 2022 17:00) (84 - 97)  RR: 18 (21 Oct 2022 05:33) (16 - 18)  SpO2: 98% (20 Oct 2022 17:00) (97% - 99%)        Constitutional - [ x  ] NAD, Comfortable        [   ] other:  Chest - [ x   ] CTA     [   ] other:  Cardiovascular - [  x ] RRR, no murmer     [   ] other:  Abdomen - [ x  ] Soft, NT/ND      [   ] other:        - No KENNY CATHETER   [   ] YES  if yes: [   ] NO MEATAL TEAR OR DISCHARGE [   ] other:  Extremities - [ x ]  No C/C/E, No calf tenderness       [   ] other:  ROM - [ x  ] WFL     [   ] other: chronic arthritic changes both knees. Atrophy of intrinsic muscles of hands                                     Neurologic Exam -                 Cognitive - [   ]Awake, Alert, AAO to self, place, date, year, situation         [  x  ] other: oriented to month and year, not date. Knows President.       Communication - [ x  ]Fluent, No dysarthria       [   ] other:      Motor - No focal deficits                    Right UE -  [ x  ] WNL      [    ] other:                    Left UE -     [ x  ] WNL      [    ] other:                    Right LE -   [  x ] WNL       [    ] other:                    Left LE -      [x   ]WNL        [    ] other:      Sensory - [  x ] Intact to LT, burning dysesthesias both feet    [    ] other:          Reflexes - [ x  ] wnl/ symmetric     [   ] other:     Psychiatric - [ x  ]Mood stable, Affect WNL     [   ]other:     Skin - [  x ] intact      [   ] other      acetaminophen     Tablet .. 650 milliGRAM(s) Oral every 6 hours  atorvastatin 10 milliGRAM(s) Oral at bedtime  DULoxetine 30 milliGRAM(s) Oral every 12 hours  glycerin Suppository - Adult 1 Suppository(s) Rectal once  heparin   Injectable 5000 Unit(s) SubCutaneous every 8 hours  influenza  Vaccine (HIGH DOSE) 0.7 milliLiter(s) IntraMuscular once  levothyroxine 50 MICROGram(s) Oral daily  lidocaine   4% Patch 1 Patch Transdermal every 24 hours  losartan 100 milliGRAM(s) Oral daily  metoprolol tartrate 25 milliGRAM(s) Oral every 12 hours  NIFEdipine XL 30 milliGRAM(s) Oral daily  oxybutynin 5 milliGRAM(s) Oral every 12 hours  pantoprazole    Tablet 40 milliGRAM(s) Oral before breakfast  sodium chloride 3 Gram(s) Oral every 8 hours  warfarin 5 milliGRAM(s) Oral once      RECENT LABS/IMAGING                        10.5   1.59  )-----------( 104      ( 21 Oct 2022 07:16 )             29.8     10-21    134<L>  |  98  |  12  ----------------------------<  102<H>  4.2   |  27  |  <0.5<L>    Ca    8.5      21 Oct 2022 07:16  Phos  3.3     10-19  Mg     1.6     10-21    TPro  5.6<L>  /  Alb  3.5  /  TBili  0.6  /  DBili  x   /  AST  20  /  ALT  17  /  AlkPhos  115  10-21    PT/INR - ( 21 Oct 2022 07:16 )   PT: 12.30 sec;   INR: 1.08 ratio         PTT - ( 20 Oct 2022 23:08 )  PTT:44.2 sec

## 2022-10-21 NOTE — PROGRESS NOTE ADULT - SUBJECTIVE AND OBJECTIVE BOX
Nephrology progress note    THIS IS AN INCOMPLETE NOTE . FULL NOTE TO FOLLOW SHORTLY    Patient is seen and examined, events over the last 24 h noted .    Allergies:  Tegretol (Hives; Rash)    Hospital Medications:   MEDICATIONS  (STANDING):  acetaminophen     Tablet .. 650 milliGRAM(s) Oral every 6 hours  atorvastatin 10 milliGRAM(s) Oral at bedtime  DULoxetine 30 milliGRAM(s) Oral every 12 hours  influenza  Vaccine (HIGH DOSE) 0.7 milliLiter(s) IntraMuscular once  levothyroxine 50 MICROGram(s) Oral daily  lidocaine   4% Patch 1 Patch Transdermal every 24 hours  losartan 100 milliGRAM(s) Oral daily  metoprolol tartrate 25 milliGRAM(s) Oral every 12 hours  NIFEdipine XL 30 milliGRAM(s) Oral daily  oxybutynin 5 milliGRAM(s) Oral every 12 hours  pantoprazole    Tablet 40 milliGRAM(s) Oral before breakfast  sodium chloride 3 Gram(s) Oral every 8 hours        VITALS:  T(F): 97.7 (10-21-22 @ 05:33), Max: 97.7 (10-21-22 @ 05:33)  HR: 100 (10-21-22 @ 05:33)  BP: 112/73 (10-21-22 @ 05:33)  RR: 18 (10-21-22 @ 05:33)  SpO2: 98% (10-20-22 @ 17:00)  Wt(kg): --      Weight (kg): 56 (10-21 @ 05:33)    PHYSICAL EXAM:  Constitutional: NAD  HEENT: anicteric sclera, oropharynx clear, MMM  Neck: No JVD  Respiratory: CTAB, no wheezes, rales or rhonchi  Cardiovascular: S1, S2, RRR  Gastrointestinal: BS+, soft, NT/ND  Extremities: No cyanosis or clubbing. No peripheral edema  :  No cantrell.   Skin: No rashes    LABS:  10-20    125<L>  |  91<L>  |  13  ----------------------------<  112<H>  4.3   |  23  |  0.6<L>    SODIUM TREND:  Sodium 125 [10-20 @ 13:21]  Sodium 127 [10-20 @ 05:26]  Sodium 135 [10-19 @ 22:53]  Sodium 124 [10-19 @ 18:34]  Sodium 124 [10-19 @ 11:18]  Sodium 126 [10-19 @ 05:34]  Sodium 127 [10-18 @ 23:43]  Sodium 131 [10-18 @ 11:39]  Sodium 132 [10-18 @ 05:33]  Sodium 135 [10-17 @ 22:37]    Ca    8.5      20 Oct 2022 13:21  Phos  3.3     10-19  Mg     1.9     10-19                            10.0   1.93  )-----------( 97       ( 20 Oct 2022 23:08 )             28.4       Urine Studies:  Urinalysis Basic - ( 16 Oct 2022 16:10 )    Color: Yellow / Appearance: Slightly Turbid / S.029 / pH:   Gluc:  / Ketone: Moderate  / Bili: Negative / Urobili: 3 mg/dL   Blood:  / Protein: 100 mg/dL / Nitrite: Negative   Leuk Esterase: Negative / RBC: 55 /HPF / WBC 6 /HPF   Sq Epi:  / Non Sq Epi: 0 /HPF / Bacteria: Negative      Sodium, Random Urine: 179.0 mmoL/L (10-19 @ 10:20)  Osmolality, Random Urine: 495 mos/kg (10-19 @ 10:20)  Osmolality, Random Urine: 384 mos/kg (10-16 @ 16:10)  Sodium, Random Urine: 100.0 mmoL/L (10-16 @ 16:10)      TSH 4.68      [10-20-22 @ 05:26]          RADIOLOGY & ADDITIONAL STUDIES:   Nephrology progress note  Patient is seen and examined, events over the last 24 h noted .  Lying in bed   no events     Allergies:  Tegretol (Hives; Rash)    Hospital Medications:   MEDICATIONS  (STANDING):  acetaminophen     Tablet .. 650 milliGRAM(s) Oral every 6 hours  atorvastatin 10 milliGRAM(s) Oral at bedtime  DULoxetine 30 milliGRAM(s) Oral every 12 hours  influenza  Vaccine (HIGH DOSE) 0.7 milliLiter(s) IntraMuscular once  levothyroxine 50 MICROGram(s) Oral daily  lidocaine   4% Patch 1 Patch Transdermal every 24 hours  losartan 100 milliGRAM(s) Oral daily  metoprolol tartrate 25 milliGRAM(s) Oral every 12 hours  NIFEdipine XL 30 milliGRAM(s) Oral daily  oxybutynin 5 milliGRAM(s) Oral every 12 hours  pantoprazole    Tablet 40 milliGRAM(s) Oral before breakfast  sodium chloride 3 Gram(s) Oral every 8 hours        VITALS:  T(F): 97.7 (10-21-22 @ 05:33), Max: 97.7 (10-21-22 @ 05:33)  HR: 100 (10-21-22 @ 05:33)  BP: 112/73 (10-21-22 @ 05:33)  RR: 18 (10-21-22 @ 05:33)  SpO2: 98% (10-20-22 @ 17:00)        Weight (kg): 56 (10-21 @ 05:33)    PHYSICAL EXAM:  Constitutional: NAD  Neck: No JVD  Respiratory: CTAB,   Cardiovascular: S1, S2, RRR  Gastrointestinal: BS+, soft, NT/ND  Extremities: No cyanosis or clubbing. No peripheral edema  :  No cantrell.   Skin: No rashes    LABS:  10-21    134<L>  |  98  |  12  ----------------------------<  102<H>  4.2   |  27  |  <0.5<L>    Ca    8.5      21 Oct 2022 07:16  Phos  3.3     10-19  Mg     1.6     10-21    TPro  5.6<L>  /  Alb  3.5  /  TBili  0.6  /  DBili  x   /  AST  20  /  ALT  17  /  AlkPhos  115  10-21    10-20    125<L>  |  91<L>  |  13  ----------------------------<  112<H>  4.3   |  23  |  0.6<L>    SODIUM TREND:  Sodium 125 [10-20 @ 13:21]  Sodium 127 [10-20 @ 05:26]  Sodium 135 [10-19 @ 22:53]  Sodium 124 [10-19 @ 18:34]  Sodium 124 [10-19 @ 11:18]  Sodium 126 [10-19 @ 05:34]  Sodium 127 [10-18 @ 23:43]  Sodium 131 [10-18 @ 11:39]  Sodium 132 [10-18 @ 05:33]  Sodium 135 [10-17 @ 22:37]    Ca    8.5      20 Oct 2022 13:21  Phos  3.3     10-19  Mg     1.9     10-19                            10.0   1.93  )-----------( 97       ( 20 Oct 2022 23:08 )             28.4       Urine Studies:  Urinalysis Basic - ( 16 Oct 2022 16:10 )    Color: Yellow / Appearance: Slightly Turbid / S.029 / pH:   Gluc:  / Ketone: Moderate  / Bili: Negative / Urobili: 3 mg/dL   Blood:  / Protein: 100 mg/dL / Nitrite: Negative   Leuk Esterase: Negative / RBC: 55 /HPF / WBC 6 /HPF   Sq Epi:  / Non Sq Epi: 0 /HPF / Bacteria: Negative      Sodium, Random Urine: 179.0 mmoL/L (10-19 @ 10:20)  Osmolality, Random Urine: 495 mos/kg (10-19 @ 10:20)  Osmolality, Random Urine: 384 mos/kg (10-16 @ 16:10)  Sodium, Random Urine: 100.0 mmoL/L (10-16 @ 16:10)      TSH 4.68      [10-20-22 @ 05:26]          RADIOLOGY & ADDITIONAL STUDIES:

## 2022-10-21 NOTE — PROGRESS NOTE ADULT - ASSESSMENT
Primary Contact Name: Rea  Relationship: daughter      Pertinent Social History: Patient has longstanding hx of anxiety, however, not formally diagnosed. Patient currently taking Cymbalta. Patient is able to feed herself, however, needs help with dressing, bath and bathroom.  Patient has a HHA which helps with all adls and iadls     Highest level of education: HS      Worked: taking care older people and housekeeping ; mainly housewife    Born and raised in [place] came to the  in [place]       Premorbid Functioning:     ADLs:  dependent     IADLs: dependent     Psychiatric History/Treatment: Anxiety worse over the 5 years b/x of fall risk; takes xanax .5 as needed    Cognition: per daughter reported confused  “out of it” forgetfulness but attributed to the anxiety but denied any other premorbid difficulties    Substance Use: Denied stopped smoking when was 32 y old. Stop drinking 30 years ago       Current Status:     Mood Changes:  Yes; worsen Cymbalta (month ago)       Cognition Changes: moments of confusion      Behavior Changes:  Denied       Patient’s Primary Language: English      a) Changes in understanding primary language after Neurological event. No      b) Preferred language for health care information? English      Brain Injury / Cognitive Behavioral Protocol Education Provided: Not Applicable      Family Education: Daughter was educated about the rehabilitation process, current status, and family education sessions.     Family Concerns: Cognition and positive coping / Returning to baseline level      Family Goals: Return to baseline; walking with the walker. Support positive coping and cognitive assessment.

## 2022-10-22 LAB
ANION GAP SERPL CALC-SCNC: 9 MMOL/L — SIGNIFICANT CHANGE UP (ref 7–14)
BUN SERPL-MCNC: 13 MG/DL — SIGNIFICANT CHANGE UP (ref 10–20)
CALCIUM SERPL-MCNC: 8.4 MG/DL — SIGNIFICANT CHANGE UP (ref 8.4–10.4)
CHLORIDE SERPL-SCNC: 98 MMOL/L — SIGNIFICANT CHANGE UP (ref 98–110)
CO2 SERPL-SCNC: 27 MMOL/L — SIGNIFICANT CHANGE UP (ref 17–32)
CREAT SERPL-MCNC: 0.5 MG/DL — LOW (ref 0.7–1.5)
EGFR: 92 ML/MIN/1.73M2 — SIGNIFICANT CHANGE UP
GLUCOSE BLDC GLUCOMTR-MCNC: 102 MG/DL — HIGH (ref 70–99)
GLUCOSE BLDC GLUCOMTR-MCNC: 115 MG/DL — HIGH (ref 70–99)
GLUCOSE SERPL-MCNC: 87 MG/DL — SIGNIFICANT CHANGE UP (ref 70–99)
INR BLD: 1.21 RATIO — SIGNIFICANT CHANGE UP (ref 0.65–1.3)
MAGNESIUM SERPL-MCNC: 1.5 MG/DL — LOW (ref 1.8–2.4)
POTASSIUM SERPL-MCNC: 3.5 MMOL/L — SIGNIFICANT CHANGE UP (ref 3.5–5)
POTASSIUM SERPL-SCNC: 3.5 MMOL/L — SIGNIFICANT CHANGE UP (ref 3.5–5)
PROTHROM AB SERPL-ACNC: 13.9 SEC — HIGH (ref 9.95–12.87)
SODIUM SERPL-SCNC: 134 MMOL/L — LOW (ref 135–146)

## 2022-10-22 RX ORDER — WARFARIN SODIUM 2.5 MG/1
5 TABLET ORAL ONCE
Refills: 0 | Status: COMPLETED | OUTPATIENT
Start: 2022-10-22 | End: 2022-10-22

## 2022-10-22 RX ORDER — MAGNESIUM OXIDE 400 MG ORAL TABLET 241.3 MG
400 TABLET ORAL
Refills: 0 | Status: DISCONTINUED | OUTPATIENT
Start: 2022-10-22 | End: 2022-10-24

## 2022-10-22 RX ORDER — ENOXAPARIN SODIUM 100 MG/ML
40 INJECTION SUBCUTANEOUS ONCE
Refills: 0 | Status: COMPLETED | OUTPATIENT
Start: 2022-10-22 | End: 2022-10-22

## 2022-10-22 RX ORDER — WARFARIN SODIUM 2.5 MG/1
1 TABLET ORAL
Qty: 0 | Refills: 0 | DISCHARGE
Start: 2022-10-22

## 2022-10-22 RX ORDER — MAGNESIUM SULFATE 500 MG/ML
2 VIAL (ML) INJECTION
Refills: 0 | Status: COMPLETED | OUTPATIENT
Start: 2022-10-22 | End: 2022-10-22

## 2022-10-22 RX ORDER — ENOXAPARIN SODIUM 100 MG/ML
40 INJECTION SUBCUTANEOUS EVERY 24 HOURS
Refills: 0 | Status: DISCONTINUED | OUTPATIENT
Start: 2022-10-23 | End: 2022-10-31

## 2022-10-22 RX ADMIN — PANTOPRAZOLE SODIUM 40 MILLIGRAM(S): 20 TABLET, DELAYED RELEASE ORAL at 06:19

## 2022-10-22 RX ADMIN — Medication 650 MILLIGRAM(S): at 12:38

## 2022-10-22 RX ADMIN — ATORVASTATIN CALCIUM 10 MILLIGRAM(S): 80 TABLET, FILM COATED ORAL at 21:58

## 2022-10-22 RX ADMIN — Medication 25 MILLIGRAM(S): at 19:35

## 2022-10-22 RX ADMIN — LOSARTAN POTASSIUM 100 MILLIGRAM(S): 100 TABLET, FILM COATED ORAL at 06:19

## 2022-10-22 RX ADMIN — Medication 5 MILLIGRAM(S): at 06:18

## 2022-10-22 RX ADMIN — SODIUM CHLORIDE 3 GRAM(S): 9 INJECTION INTRAMUSCULAR; INTRAVENOUS; SUBCUTANEOUS at 06:18

## 2022-10-22 RX ADMIN — Medication 50 MICROGRAM(S): at 06:17

## 2022-10-22 RX ADMIN — Medication 650 MILLIGRAM(S): at 23:06

## 2022-10-22 RX ADMIN — SODIUM CHLORIDE 3 GRAM(S): 9 INJECTION INTRAMUSCULAR; INTRAVENOUS; SUBCUTANEOUS at 21:57

## 2022-10-22 RX ADMIN — DULOXETINE HYDROCHLORIDE 30 MILLIGRAM(S): 30 CAPSULE, DELAYED RELEASE ORAL at 19:35

## 2022-10-22 RX ADMIN — Medication 5 MILLIGRAM(S): at 19:36

## 2022-10-22 RX ADMIN — SODIUM CHLORIDE 3 GRAM(S): 9 INJECTION INTRAMUSCULAR; INTRAVENOUS; SUBCUTANEOUS at 13:50

## 2022-10-22 RX ADMIN — Medication 25 MILLIGRAM(S): at 06:19

## 2022-10-22 RX ADMIN — LIDOCAINE 1 PATCH: 4 CREAM TOPICAL at 21:59

## 2022-10-22 RX ADMIN — MAGNESIUM OXIDE 400 MG ORAL TABLET 400 MILLIGRAM(S): 241.3 TABLET ORAL at 19:39

## 2022-10-22 RX ADMIN — Medication 25 GRAM(S): at 18:59

## 2022-10-22 RX ADMIN — Medication 650 MILLIGRAM(S): at 19:35

## 2022-10-22 RX ADMIN — Medication 650 MILLIGRAM(S): at 00:00

## 2022-10-22 RX ADMIN — ENOXAPARIN SODIUM 40 MILLIGRAM(S): 100 INJECTION SUBCUTANEOUS at 18:58

## 2022-10-22 RX ADMIN — WARFARIN SODIUM 5 MILLIGRAM(S): 2.5 TABLET ORAL at 21:58

## 2022-10-22 RX ADMIN — Medication 25 GRAM(S): at 22:11

## 2022-10-22 RX ADMIN — Medication 650 MILLIGRAM(S): at 23:39

## 2022-10-22 RX ADMIN — HEPARIN SODIUM 5000 UNIT(S): 5000 INJECTION INTRAVENOUS; SUBCUTANEOUS at 06:17

## 2022-10-22 RX ADMIN — Medication 30 MILLIGRAM(S): at 06:19

## 2022-10-22 RX ADMIN — LIDOCAINE 1 PATCH: 4 CREAM TOPICAL at 06:53

## 2022-10-22 RX ADMIN — DULOXETINE HYDROCHLORIDE 30 MILLIGRAM(S): 30 CAPSULE, DELAYED RELEASE ORAL at 06:17

## 2022-10-22 NOTE — PROGRESS NOTE ADULT - SUBJECTIVE AND OBJECTIVE BOX
T(C): 36.1 (10-22-22 @ 05:05), Max: 36.6 (10-21-22 @ 21:20)  HR: 95 (10-22-22 @ 05:05) (95 - 99)  BP: 121/61 (10-22-22 @ 05:05) (113/61 - 135/62)  RR: 18 (10-22-22 @ 05:05) (18 - 18)  SpO2: --      Patient was stable overnight and expresses no new complaints     PE:    Alert   LUNGS- clear  COR- RRR  ABD- SOFT, NT  EXTR- w/o edema  NEURO- stable    10-21    134<L>  |  98  |  12  ----------------------------<  102<H>  4.2   |  27  |  <0.5<L>    Ca    8.5      21 Oct 2022 07:16  Mg     1.6     10-21    TPro  5.6<L>  /  Alb  3.5  /  TBili  0.6  /  DBili  x   /  AST  20  /  ALT  17  /  AlkPhos  115  10-21                            10.5   1.59  )-----------( 104      ( 21 Oct 2022 07:16 )             29.8       INR: 1.21 ratio (10-22-22 @ 07:47)

## 2022-10-23 LAB
ANION GAP SERPL CALC-SCNC: 12 MMOL/L — SIGNIFICANT CHANGE UP (ref 7–14)
BUN SERPL-MCNC: 15 MG/DL — SIGNIFICANT CHANGE UP (ref 10–20)
CALCIUM SERPL-MCNC: 8.3 MG/DL — LOW (ref 8.4–10.5)
CHLORIDE SERPL-SCNC: 100 MMOL/L — SIGNIFICANT CHANGE UP (ref 98–110)
CO2 SERPL-SCNC: 23 MMOL/L — SIGNIFICANT CHANGE UP (ref 17–32)
CREAT SERPL-MCNC: 0.6 MG/DL — LOW (ref 0.7–1.5)
EGFR: 88 ML/MIN/1.73M2 — SIGNIFICANT CHANGE UP
GLUCOSE SERPL-MCNC: 80 MG/DL — SIGNIFICANT CHANGE UP (ref 70–99)
INR BLD: 1.31 RATIO — HIGH (ref 0.65–1.3)
MAGNESIUM SERPL-MCNC: 1.9 MG/DL — SIGNIFICANT CHANGE UP (ref 1.8–2.4)
POTASSIUM SERPL-MCNC: 3.6 MMOL/L — SIGNIFICANT CHANGE UP (ref 3.5–5)
POTASSIUM SERPL-SCNC: 3.6 MMOL/L — SIGNIFICANT CHANGE UP (ref 3.5–5)
PROTHROM AB SERPL-ACNC: 15 SEC — HIGH (ref 9.95–12.87)
SODIUM SERPL-SCNC: 135 MMOL/L — SIGNIFICANT CHANGE UP (ref 135–146)

## 2022-10-23 RX ORDER — WARFARIN SODIUM 2.5 MG/1
5 TABLET ORAL ONCE
Refills: 0 | Status: COMPLETED | OUTPATIENT
Start: 2022-10-23 | End: 2022-10-23

## 2022-10-23 RX ADMIN — Medication 30 MILLIGRAM(S): at 06:11

## 2022-10-23 RX ADMIN — Medication 650 MILLIGRAM(S): at 18:29

## 2022-10-23 RX ADMIN — MAGNESIUM OXIDE 400 MG ORAL TABLET 400 MILLIGRAM(S): 241.3 TABLET ORAL at 10:22

## 2022-10-23 RX ADMIN — Medication 650 MILLIGRAM(S): at 05:04

## 2022-10-23 RX ADMIN — DULOXETINE HYDROCHLORIDE 30 MILLIGRAM(S): 30 CAPSULE, DELAYED RELEASE ORAL at 06:10

## 2022-10-23 RX ADMIN — LOSARTAN POTASSIUM 100 MILLIGRAM(S): 100 TABLET, FILM COATED ORAL at 06:10

## 2022-10-23 RX ADMIN — Medication 25 MILLIGRAM(S): at 06:11

## 2022-10-23 RX ADMIN — Medication 650 MILLIGRAM(S): at 20:05

## 2022-10-23 RX ADMIN — WARFARIN SODIUM 5 MILLIGRAM(S): 2.5 TABLET ORAL at 21:47

## 2022-10-23 RX ADMIN — PANTOPRAZOLE SODIUM 40 MILLIGRAM(S): 20 TABLET, DELAYED RELEASE ORAL at 06:12

## 2022-10-23 RX ADMIN — LIDOCAINE 1 PATCH: 4 CREAM TOPICAL at 21:49

## 2022-10-23 RX ADMIN — Medication 5 MILLIGRAM(S): at 18:31

## 2022-10-23 RX ADMIN — Medication 650 MILLIGRAM(S): at 07:28

## 2022-10-23 RX ADMIN — ENOXAPARIN SODIUM 40 MILLIGRAM(S): 100 INJECTION SUBCUTANEOUS at 10:27

## 2022-10-23 RX ADMIN — Medication 650 MILLIGRAM(S): at 13:27

## 2022-10-23 RX ADMIN — SODIUM CHLORIDE 3 GRAM(S): 9 INJECTION INTRAMUSCULAR; INTRAVENOUS; SUBCUTANEOUS at 13:27

## 2022-10-23 RX ADMIN — MAGNESIUM OXIDE 400 MG ORAL TABLET 400 MILLIGRAM(S): 241.3 TABLET ORAL at 18:30

## 2022-10-23 RX ADMIN — LIDOCAINE 1 PATCH: 4 CREAM TOPICAL at 21:47

## 2022-10-23 RX ADMIN — SODIUM CHLORIDE 3 GRAM(S): 9 INJECTION INTRAMUSCULAR; INTRAVENOUS; SUBCUTANEOUS at 06:12

## 2022-10-23 RX ADMIN — Medication 5 MILLIGRAM(S): at 07:29

## 2022-10-23 RX ADMIN — DULOXETINE HYDROCHLORIDE 30 MILLIGRAM(S): 30 CAPSULE, DELAYED RELEASE ORAL at 18:29

## 2022-10-23 RX ADMIN — Medication 50 MICROGRAM(S): at 06:10

## 2022-10-23 RX ADMIN — Medication 650 MILLIGRAM(S): at 06:10

## 2022-10-23 RX ADMIN — ATORVASTATIN CALCIUM 10 MILLIGRAM(S): 80 TABLET, FILM COATED ORAL at 21:42

## 2022-10-23 RX ADMIN — SODIUM CHLORIDE 3 GRAM(S): 9 INJECTION INTRAMUSCULAR; INTRAVENOUS; SUBCUTANEOUS at 21:47

## 2022-10-23 RX ADMIN — Medication 25 MILLIGRAM(S): at 18:29

## 2022-10-23 NOTE — PROGRESS NOTE ADULT - SUBJECTIVE AND OBJECTIVE BOX
Patient is a 87y old  Female who presents with a chief complaint of Metabolic Encephalopathy with decline in function (21 Oct 2022 11:53)      HPI:  Patient is a 86 y/o female with PMHx of MDS ( on Vidaza- gets every 28 days- just finished course- due in around 3 weeks) HTN, HLD, A-Fib ( On Coumadin 5mg daily), hypothyroid, neuropathy of B/L LE, Trigeminal Neuralgia ( Prior Gamma knife procedure for pain), B/L OA of the knees, prior CCY who presented to [facility] s/p fall at home. Patient was ambulating at home with the walker when she fell onto the right side. She doesn't have the best recollection of the event but denies LOC. She has a home health aide and her grandson was also home at the time. She was able to stand and walk but presented as notes ongoing dizziness and pain over her right chest. Pain over the right side is dull, worse with motion, but not sharp. She notes dizziness notable after fall but not prior. With the dizziness she gets occasional nausea. Prior to fall she denies any change in medications, new medications, recent illness, nor any other features out different from her baseline .  Patient noted on workup to have right sided fracture of the 7-10th right ribs without displacement. On Incentive she can do around 500 but without respiratory distress or hemodynamic instability. She was also noted on CT to have a small right sub arachnoid hemorrhage. Admitted to surgery and will have a SICU consult with possible SICU admission due to trauma burden. She was also noted to have a sodium of 120 and was on HCTZ. She has had a slow, improvement in her Na+ to 126 on fluid restriction and Na+ tabs. Neuro w/u revealed her SAH to be secondary to vascular calcification with a normal MRA and MRV and no further w/u recommended.     She is medically stable, but states that she has been confused and weak since about 1 week PTA. She requires CG/ min assist for transfers and CG for ambulation with a RW 40 feet only secondary to impaired endurance and mod assistance for LE dressing.   PTA, she live with her daughter and had a HHA 8 hrs. a day, 5 days a week. She reports being able to transfer and ambulate with a RW with supervision PTA and being able to dress herself. She was evaluated by me on the Trauma Service and was found to be a good acute inpatient rehab candidate.  (20 Oct 2022 13:56)    TODAY'S SUBJECTIVE & REVIEW OF SYMPTOMS:    Seen this morning. No new complaints. Denies pain. Participating in rehab.       Constiutional:    [ x  ] WNL, [place]           [   ] poor appetite   [   ] insomnia   [   ] tired     Cardio:                [ x  ] WNL           [   ] CP   [   ] BROWN   [   ] palpitations               Resp:                   [  x ] WNL           [   ] SOB   [   ] cough   [   ] wheezing   GI:                        [ x  ] WNL           [   ] constipation   [   ] diarrhea   [   ] abdominal pain   [   ] nausea   [   ] emesis                                :                      [x   ] WNL           [   ] KENNY  [   ] dysuria   [   ] difficulty voiding             Endo:                   [ x  ] WNL          [   ] polyuria   [   ] temperature intolerance                 Skin:                     [ x  ] WNL          [   ] pain   [   ] wound   [   ] rash   MSK:                    [   ] WNL          [   ] muscle pain   [ x  ] joint pain/ stiffness   [   ] muscle tenderness   [   ] swelling   Neuro:                 [   ] WNL          [   ] HA   [   ] change in vision   [   ] tremor   [x   ] weakness   [   ]dysphagia              Cognitive:           [  x ] WNL           [   ]confusion      Psych:                  [   ] WNL           [   ] hallucinations   [   ]agitation   [   ] delusion   [   ]depression [ x] anxiety      PHYSICAL EXAM    Vital Signs Last 24 Hrs  T(C): 35.9 (23 Oct 2022 04:33), Max: 35.9 (23 Oct 2022 04:33)  T(F): 96.7 (23 Oct 2022 04:33), Max: 96.7 (23 Oct 2022 04:33)  HR: 97 (23 Oct 2022 04:33) (90 - 97)  BP: 181/88 (23 Oct 2022 04:33) (124/74 - 181/88)  BP(mean): --  RR: 20 (23 Oct 2022 04:33) (18 - 20)  SpO2: --      Constitutional - [ x  ] NAD, Comfortable        [   ] other:  Chest - [ x   ] CTA     [   ] other:  Cardiovascular - [  x ] RRR, no murmer     [   ] other:  Abdomen - [ x  ] Soft, NT/ND      [   ] other:        - No KENNY CATHETER   [   ] YES  if yes: [   ] NO MEATAL TEAR OR DISCHARGE [   ] other:  Extremities - [ x ]  No C/C/E, No calf tenderness       [   ] other:  ROM - [ x  ] WFL     [   ] other: chronic arthritic changes both knees. Atrophy of intrinsic muscles of hands                                     Neurologic Exam -                 Cognitive - [   ]Awake, Alert, AAO to self, place, date, year, situation         [  x  ] other: oriented to month and year, not date. Knows President.       Communication - [ x  ]Fluent, No dysarthria       [   ] other:      Motor - No focal deficits                    Right UE -  [ x  ] WNL      [    ] other:                    Left UE -     [ x  ] WNL      [    ] other:                    Right LE -   [  x ] WNL       [    ] other:                    Left LE -      [x   ]WNL        [    ] other:      Sensory - [  x ] Intact to LT, burning dysesthesias both feet    [    ] other:          Reflexes - [ x  ] wnl/ symmetric     [   ] other:     Psychiatric - [ x  ]Mood stable, Affect WNL     [   ]other:     Skin - [  x ] intact      [   ] other    MEDICATIONS  (STANDING):  acetaminophen     Tablet .. 650 milliGRAM(s) Oral every 6 hours  atorvastatin 10 milliGRAM(s) Oral at bedtime  DULoxetine 30 milliGRAM(s) Oral every 12 hours  enoxaparin Injectable 40 milliGRAM(s) SubCutaneous every 24 hours  glycerin Suppository - Adult 1 Suppository(s) Rectal once  influenza  Vaccine (HIGH DOSE) 0.7 milliLiter(s) IntraMuscular once  levothyroxine 50 MICROGram(s) Oral daily  lidocaine   4% Patch 1 Patch Transdermal every 24 hours  losartan 100 milliGRAM(s) Oral daily  magnesium oxide 400 milliGRAM(s) Oral two times a day with meals  metoprolol tartrate 25 milliGRAM(s) Oral every 12 hours  NIFEdipine XL 30 milliGRAM(s) Oral daily  oxybutynin 5 milliGRAM(s) Oral every 12 hours  pantoprazole    Tablet 40 milliGRAM(s) Oral before breakfast  sodium chloride 3 Gram(s) Oral every 8 hours    MEDICATIONS  (PRN):        RECENT LABS/IMAGING      10-22    134<L>  |  98  |  13  ----------------------------<  87  3.5   |  27  |  0.5<L>    Ca    8.4      22 Oct 2022 07:47  Mg     1.5     10-22      PT/INR - ( 23 Oct 2022 08:43 )   PT: 15.00 sec;   INR: 1.31 ratio        POCT Blood Glucose.: 115 mg/dL (10-22-22 @ 21:21)  POCT Blood Glucose.: 102 mg/dL (10-22-22 @ 16:44)  POCT Blood Glucose.: 108 mg/dL (10-21-22 @ 21:55)  POCT Blood Glucose.: 119 mg/dL (10-20-22 @ 21:09)  POCT Blood Glucose.: 109 mg/dL (10-20-22 @ 17:10)  POCT Blood Glucose.: 107 mg/dL (10-20-22 @ 11:30)  POCT Blood Glucose.: 129 mg/dL (10-20-22 @ 08:16)                          10.5   1.59  )-----------( 104      ( 21 Oct 2022 07:16 )             29.8

## 2022-10-23 NOTE — PROGRESS NOTE ADULT - ASSESSMENT
ASSESSMENT/PLAN    #Rehab of decline in mobility and cognition, s/p fall c/w Metabolic Encephalopathy from Hyponatremia. She also has multiple rib fractures 7- 10 on the right with pain.   She notes confusion and weakness prior to her fall and neuro w/u was c/w vascular calcification (not SAH), and moderate generalized microvascular ischemia. She also has severe OA of both knees, further limiting her mobility and Peripheral Neuropathy of unclear etiology. She requires close medical supervision because of her on going severe hyponatremia currently being treated with fluid restriction and Sodium tablets. She requires and can tolerate 3 hrs a day of intensive interdisciplinary rehab including PT, OT and ST and Neuoropsych eval.     #Hyponatremia  - improving since admission, s/p IV saline, on fluid restriction and NaCl tabs 3 gms. tid.   - was on HCTZ on admission, stopped  - followed by Renal Medicine  - monitor daily  - 127>125>134    #Hypomagnesemia  - Continue supplementation and monitor    #Hypokalemia  - 3.5 on 11/23  - supplement and monitor    #Peripheral Neuropathy  - dysesthesias of feet and atrophy of intrinsic muscles of hands  - unknown etiology  - was on Trileptal at home and now on Cymbalta    #History of Chronic Atrial Fibrillation  - rate controlled. Currently RRR  - Coumadin held prior to admission to rehab secondary to suspision of SAH, now ruled out  - Resume coumadin home dose 5 mg daily and adjust for INR 2-3  - adjust coumadin according to daily INR  - INR 1.31 on 10/23. Continue home dose of 5 mg daily    #History of Myelodysplasia  - stable pancytopenia  - followed by Heme/Onc.   - On Vidaza every 28 days    #Osteoarthritis  - pain meds, modalities, ther-ex    #Chronic Microvascular Cerebrovascular Disease  - monitor    #HTN  - controlled on current meds    #HLD  - on statin    #[place]  - Pocket Talker  - consider outpatient Audiology eval    #Hypothyroidism  - on synthroid    #Pain control:   - rib fracture  - neuropathy pain  - Tylenol prn, Cymbalta    #GI/Bowel Mgmt: Contipation in hospital  - continue bowel meds and monitor    - Diet: Regular with salt tabs         Precautions / PROPHYLAXIS:      - Falls    - Ortho: Weight bearing status: WBAT      - DVT prophylaxis: coumadin (d/c Lovenox when INR therapeutic)

## 2022-10-24 LAB
ALBUMIN SERPL ELPH-MCNC: 3.6 G/DL — SIGNIFICANT CHANGE UP (ref 3.5–5.2)
ALP SERPL-CCNC: 112 U/L — SIGNIFICANT CHANGE UP (ref 30–115)
ALT FLD-CCNC: 20 U/L — SIGNIFICANT CHANGE UP (ref 0–41)
ANION GAP SERPL CALC-SCNC: 7 MMOL/L — SIGNIFICANT CHANGE UP (ref 7–14)
AST SERPL-CCNC: 17 U/L — SIGNIFICANT CHANGE UP (ref 0–41)
BASOPHILS # BLD AUTO: 0.04 K/UL — SIGNIFICANT CHANGE UP (ref 0–0.2)
BASOPHILS NFR BLD AUTO: 2.5 % — HIGH (ref 0–1)
BILIRUB SERPL-MCNC: 0.5 MG/DL — SIGNIFICANT CHANGE UP (ref 0.2–1.2)
BUN SERPL-MCNC: 15 MG/DL — SIGNIFICANT CHANGE UP (ref 10–20)
CALCIUM SERPL-MCNC: 8.7 MG/DL — SIGNIFICANT CHANGE UP (ref 8.4–10.5)
CHLORIDE SERPL-SCNC: 103 MMOL/L — SIGNIFICANT CHANGE UP (ref 98–110)
CO2 SERPL-SCNC: 27 MMOL/L — SIGNIFICANT CHANGE UP (ref 17–32)
CREAT SERPL-MCNC: 0.5 MG/DL — LOW (ref 0.7–1.5)
EGFR: 92 ML/MIN/1.73M2 — SIGNIFICANT CHANGE UP
EOSINOPHIL # BLD AUTO: 0.06 K/UL — SIGNIFICANT CHANGE UP (ref 0–0.7)
EOSINOPHIL NFR BLD AUTO: 3.7 % — SIGNIFICANT CHANGE UP (ref 0–8)
GLUCOSE BLDC GLUCOMTR-MCNC: 89 MG/DL — SIGNIFICANT CHANGE UP (ref 70–99)
GLUCOSE BLDC GLUCOMTR-MCNC: 90 MG/DL — SIGNIFICANT CHANGE UP (ref 70–99)
GLUCOSE SERPL-MCNC: 101 MG/DL — HIGH (ref 70–99)
HCT VFR BLD CALC: 31.9 % — LOW (ref 37–47)
HGB BLD-MCNC: 10.7 G/DL — LOW (ref 12–16)
IMM GRANULOCYTES NFR BLD AUTO: 0 % — LOW (ref 0.1–0.3)
INR BLD: 1.51 RATIO — HIGH (ref 0.65–1.3)
LYMPHOCYTES # BLD AUTO: 0.8 K/UL — LOW (ref 1.2–3.4)
LYMPHOCYTES # BLD AUTO: 49.4 % — SIGNIFICANT CHANGE UP (ref 20.5–51.1)
MAGNESIUM SERPL-MCNC: 1.8 MG/DL — SIGNIFICANT CHANGE UP (ref 1.8–2.4)
MCHC RBC-ENTMCNC: 33.5 G/DL — SIGNIFICANT CHANGE UP (ref 32–37)
MCHC RBC-ENTMCNC: 33.9 PG — HIGH (ref 27–31)
MCV RBC AUTO: 100.9 FL — HIGH (ref 81–99)
MONOCYTES # BLD AUTO: 0.03 K/UL — LOW (ref 0.1–0.6)
MONOCYTES NFR BLD AUTO: 1.9 % — SIGNIFICANT CHANGE UP (ref 1.7–9.3)
NEUTROPHILS # BLD AUTO: 0.69 K/UL — LOW (ref 1.4–6.5)
NEUTROPHILS NFR BLD AUTO: 42.5 % — SIGNIFICANT CHANGE UP (ref 42.2–75.2)
NRBC # BLD: 0 /100 WBCS — SIGNIFICANT CHANGE UP (ref 0–0)
PLATELET # BLD AUTO: 150 K/UL — SIGNIFICANT CHANGE UP (ref 130–400)
POTASSIUM SERPL-MCNC: 3.9 MMOL/L — SIGNIFICANT CHANGE UP (ref 3.5–5)
POTASSIUM SERPL-SCNC: 3.9 MMOL/L — SIGNIFICANT CHANGE UP (ref 3.5–5)
PROT SERPL-MCNC: 5.8 G/DL — LOW (ref 6–8)
PROTHROM AB SERPL-ACNC: 17.4 SEC — HIGH (ref 9.95–12.87)
RBC # BLD: 3.16 M/UL — LOW (ref 4.2–5.4)
RBC # FLD: 15.6 % — HIGH (ref 11.5–14.5)
SODIUM SERPL-SCNC: 137 MMOL/L — SIGNIFICANT CHANGE UP (ref 135–146)
WBC # BLD: 1.62 K/UL — LOW (ref 4.8–10.8)
WBC # FLD AUTO: 1.62 K/UL — LOW (ref 4.8–10.8)

## 2022-10-24 RX ORDER — MAGNESIUM OXIDE 400 MG ORAL TABLET 241.3 MG
400 TABLET ORAL
Refills: 0 | Status: DISCONTINUED | OUTPATIENT
Start: 2022-10-24 | End: 2022-11-02

## 2022-10-24 RX ORDER — WARFARIN SODIUM 2.5 MG/1
5 TABLET ORAL ONCE
Refills: 0 | Status: COMPLETED | OUTPATIENT
Start: 2022-10-24 | End: 2022-10-24

## 2022-10-24 RX ADMIN — Medication 650 MILLIGRAM(S): at 04:11

## 2022-10-24 RX ADMIN — LOSARTAN POTASSIUM 100 MILLIGRAM(S): 100 TABLET, FILM COATED ORAL at 06:20

## 2022-10-24 RX ADMIN — DULOXETINE HYDROCHLORIDE 30 MILLIGRAM(S): 30 CAPSULE, DELAYED RELEASE ORAL at 17:12

## 2022-10-24 RX ADMIN — Medication 50 MICROGRAM(S): at 06:19

## 2022-10-24 RX ADMIN — Medication 650 MILLIGRAM(S): at 17:12

## 2022-10-24 RX ADMIN — LIDOCAINE 1 PATCH: 4 CREAM TOPICAL at 06:50

## 2022-10-24 RX ADMIN — Medication 650 MILLIGRAM(S): at 06:49

## 2022-10-24 RX ADMIN — LIDOCAINE 1 PATCH: 4 CREAM TOPICAL at 11:05

## 2022-10-24 RX ADMIN — SODIUM CHLORIDE 3 GRAM(S): 9 INJECTION INTRAMUSCULAR; INTRAVENOUS; SUBCUTANEOUS at 17:09

## 2022-10-24 RX ADMIN — Medication 5 MILLIGRAM(S): at 06:20

## 2022-10-24 RX ADMIN — Medication 650 MILLIGRAM(S): at 00:04

## 2022-10-24 RX ADMIN — MAGNESIUM OXIDE 400 MG ORAL TABLET 400 MILLIGRAM(S): 241.3 TABLET ORAL at 17:12

## 2022-10-24 RX ADMIN — PANTOPRAZOLE SODIUM 40 MILLIGRAM(S): 20 TABLET, DELAYED RELEASE ORAL at 06:20

## 2022-10-24 RX ADMIN — Medication 5 MILLIGRAM(S): at 17:12

## 2022-10-24 RX ADMIN — Medication 650 MILLIGRAM(S): at 06:19

## 2022-10-24 RX ADMIN — Medication 25 MILLIGRAM(S): at 17:12

## 2022-10-24 RX ADMIN — MAGNESIUM OXIDE 400 MG ORAL TABLET 400 MILLIGRAM(S): 241.3 TABLET ORAL at 07:27

## 2022-10-24 RX ADMIN — LIDOCAINE 1 PATCH: 4 CREAM TOPICAL at 21:58

## 2022-10-24 RX ADMIN — Medication 25 MILLIGRAM(S): at 06:20

## 2022-10-24 RX ADMIN — SODIUM CHLORIDE 3 GRAM(S): 9 INJECTION INTRAMUSCULAR; INTRAVENOUS; SUBCUTANEOUS at 06:21

## 2022-10-24 RX ADMIN — SODIUM CHLORIDE 3 GRAM(S): 9 INJECTION INTRAMUSCULAR; INTRAVENOUS; SUBCUTANEOUS at 21:59

## 2022-10-24 RX ADMIN — Medication 30 MILLIGRAM(S): at 06:20

## 2022-10-24 RX ADMIN — ATORVASTATIN CALCIUM 10 MILLIGRAM(S): 80 TABLET, FILM COATED ORAL at 22:01

## 2022-10-24 RX ADMIN — DULOXETINE HYDROCHLORIDE 30 MILLIGRAM(S): 30 CAPSULE, DELAYED RELEASE ORAL at 06:19

## 2022-10-24 RX ADMIN — Medication 650 MILLIGRAM(S): at 11:05

## 2022-10-24 RX ADMIN — MAGNESIUM OXIDE 400 MG ORAL TABLET 400 MILLIGRAM(S): 241.3 TABLET ORAL at 11:54

## 2022-10-24 RX ADMIN — Medication 650 MILLIGRAM(S): at 11:51

## 2022-10-24 RX ADMIN — ENOXAPARIN SODIUM 40 MILLIGRAM(S): 100 INJECTION SUBCUTANEOUS at 10:49

## 2022-10-24 NOTE — PROGRESS NOTE ADULT - ASSESSMENT
ASSESSMENT/PLAN    #Rehab of decline in mobility and cognition, s/p fall c/w Metabolic Encephalopathy from Hyponatremia. She also has multiple rib fractures 7- 10 on the right with pain.   She notes confusion and weakness prior to her fall and neuro w/u was c/w vascular calcification (not SAH), and moderate generalized microvascular ischemia. She also has severe OA of both knees, further limiting her mobility and Peripheral Neuropathy of unclear etiology. She requires close medical supervision because of her on going severe hyponatremia currently being treated with fluid restriction and Sodium tablets. She requires and can tolerate 3 hrs a day of intensive interdisciplinary rehab including PT, OT and ST and Neuoropsych eval.     #Hyponatremia  - improving since admission, s/p IV saline, on fluid restriction and NaCl tabs 3 gms. tid.   - was on HCTZ on admission, stopped  - followed by Renal Medicine;  - monitor daily  - 127>125>134 > 137    #Hypomagnesemia  - Continue supplementation and monitor    #Hypokalemia  - 3.5 > 3.9   - improved    #Peripheral Neuropathy  - dysesthesias of feet and atrophy of intrinsic muscles of hands  - unknown etiology  - was on Trileptal at home and now on Cymbalta    #History of Chronic Atrial Fibrillation  - rate controlled. Currently RRR  - Coumadin held prior to admission to rehab secondary to suspision of SAH, now ruled out  - Resume coumadin home dose 5 mg daily and adjust for INR 2-3  - adjust coumadin according to daily INR  - INR 1.31 on 10/23. Continue home dose of 5 mg daily    #History of Myelodysplasia  - stable pancytopenia  - followed by Heme/Onc.   - On Vidaza every 28 days    #Osteoarthritis  - pain meds, modalities, ther-ex    #Chronic Microvascular Cerebrovascular Disease  - monitor    #HTN  - controlled on current meds    #HLD  - on statin    #[place]  - Pocket Talker  - consider outpatient Audiology eval    #Hypothyroidism  - on synthroid    #Pain control:   - rib fracture  - neuropathy pain  - Tylenol prn, Cymbalta    #GI/Bowel Mgmt: Contipation in hospital  - continue bowel meds and monitor    - Diet: Regular with salt tabs         Precautions / PROPHYLAXIS:      - Falls    - Ortho: Weight bearing status: WBAT      - DVT prophylaxis: coumadin (d/c Lovenox when INR therapeutic)     ASSESSMENT/PLAN    #Rehab of decline in mobility and cognition, s/p fall c/w Metabolic Encephalopathy from Hyponatremia. She also has multiple rib fractures 7- 10 on the right with pain.   She notes confusion and weakness prior to her fall and neuro w/u was c/w vascular calcification (not SAH), and moderate generalized microvascular ischemia. She also has severe OA of both knees, further limiting her mobility and Peripheral Neuropathy of unclear etiology. She requires close medical supervision because of her hyponatremia currently being treated with fluid restriction and Sodium tablets. She requires and can tolerate 3 hrs a day of intensive interdisciplinary rehab including PT, OT and ST and Neuoropsych eval.     #Hyponatremia  - improving since admission, s/p IV saline, on fluid restriction and NaCl tabs 3 gms. tid.   - was on HCTZ on admission, stopped  - followed by Renal Medicine;  - monitor daily  - 127>125>134 > 137  - wean salt tabs    #Hypomagnesemia  - Continue supplementation and monitor    #Hypokalemia  - 3.5 > 3.9   - improved    #Peripheral Neuropathy  - dysesthesias of feet and atrophy of intrinsic muscles of hands  - unknown etiology  - was on Trileptal at home and now on Cymbalta    #History of Chronic Atrial Fibrillation  - rate controlled. Currently RRR  - Coumadin held prior to admission to rehab secondary to suspision of SAH, now ruled out  - Resume coumadin home dose 5 mg daily and adjust for INR 2-3  - adjust coumadin according to daily INR  - INR 1.51 10/24. Continue home dose of 5 mg daily. Slowly increasing    #History of Myelodysplasia  - stable pancytopenia  - followed by Heme/Onc.   - On Vidaza every 28 days    #Osteoarthritis  - pain meds, modalities, ther-ex    #Chronic Microvascular Cerebrovascular Disease  - monitor    #HTN  - controlled on current meds    #HLD  - on statin    #[place]  - Pocket Talker  - consider outpatient Audiology eval    #Hypothyroidism  - on synthroid    #Pain control:   - rib fracture  - neuropathy pain  - Tylenol prn, Cymbalta    #GI/Bowel Mgmt: Contipation in hospital  - continue bowel meds and monitor    - Diet: Regular with salt tabs         Precautions / PROPHYLAXIS:      - Falls    - Ortho: Weight bearing status: WBAT      - DVT prophylaxis: coumadin (d/c Lovenox when INR therapeutic)

## 2022-10-24 NOTE — PROGRESS NOTE ADULT - SUBJECTIVE AND OBJECTIVE BOX
Patient is a 87y old  Female who presents with a chief complaint of Metabolic Encephalopathy with decline in function (21 Oct 2022 11:53)      HPI:  Patient is a 86 y/o female with PMHx of MDS ( on Vidaza- gets every 28 days- just finished course- due in around 3 weeks) HTN, HLD, A-Fib ( On Coumadin 5mg daily), hypothyroid, neuropathy of B/L LE, Trigeminal Neuralgia ( Prior Gamma knife procedure for pain), B/L OA of the knees, prior CCY who presented [facility] s/p fall at home. Patient was ambulating at home with the walker when she fell onto the right side. She doesn't have the best recollection of the event but denies LOC. She has a home health aide and her grandson was also home at the time. She was able to stand and walk but presented as notes ongoing dizziness and pain over her right chest. Pain over the right side is dull, worse with motion, but not sharp. She notes dizziness notable after fall but not prior. With the dizziness she gets occasional nausea. Prior to fall she denies any change in medications, new medications, recent illness, nor any other features out different from her baseline .  Patient noted on workup to have right sided fracture of the 7-10th right ribs without displacement. On Incentive she can do around 500 but without respiratory distress or hemodynamic instability. She was also noted on CT to have a small right sub arachnoid hemorrhage. Admitted to surgery and will have a SICU consult with possible SICU admission due to trauma burden. She was also noted to have a sodium of 120 and was on HCTZ. She has had a slow, improvement in her Na+ to 126 on fluid restriction and Na+ tabs. Neuro w/u revealed her SAH to be secondary to vascular calcification with a normal MRA and MRV and no further w/u recommended.     She is medically stable, but states that she has been confused and weak since about 1 week PTA. She requires CG/ min assist for transfers and CG for ambulation with a RW 40 feet only secondary to impaired endurance and mod assistance for LE dressing.   PTA, she live with her daughter and had a HHA 8 hrs. a day, 5 days a week. She reports being able to transfer and ambulate with a RW with supervision PTA and being able to dress herself. She was evaluated by me on the Trauma Service and was found to be a good acute inpatient rehab candidate.  (20 Oct 2022 13:56)    TODAY'S SUBJECTIVE & REVIEW OF SYMPTOMS:    Seen this morning. No new complaints. Denies pain. Participating in rehab. Na improved to 137. INR is 1.5, patient will receive 5mg of coumadin.        Constiutional:    [ x  ] WNL, [place]           [   ] poor appetite   [   ] insomnia   [   ] tired     Cardio:                [ x  ] WNL           [   ] CP   [   ] BROWN   [   ] palpitations               Resp:                   [  x ] WNL           [   ] SOB   [   ] cough   [   ] wheezing   GI:                        [ x  ] WNL           [   ] constipation   [   ] diarrhea   [   ] abdominal pain   [   ] nausea   [   ] emesis                                :                      [x   ] WNL           [   ] KENNY  [   ] dysuria   [   ] difficulty voiding             Endo:                   [ x  ] WNL          [   ] polyuria   [   ] temperature intolerance                 Skin:                     [ x  ] WNL          [   ] pain   [   ] wound   [   ] rash   MSK:                    [   ] WNL          [   ] muscle pain   [ x  ] joint pain/ stiffness   [   ] muscle tenderness   [   ] swelling   Neuro:                 [   ] WNL          [   ] HA   [   ] change in vision   [   ] tremor   [x   ] weakness   [   ]dysphagia              Cognitive:           [  x ] WNL           [   ]confusion      Psych:                  [   ] WNL           [   ] hallucinations   [   ]agitation   [   ] delusion   [   ]depression [ x] anxiety      PHYSICAL EXAM    Vital Signs Last 24 Hrs  T(C): 36.2 (24 Oct 2022 07:04), Max: 36.2 (24 Oct 2022 07:04)  T(F): 97.1 (24 Oct 2022 07:04), Max: 97.1 (24 Oct 2022 07:04)  HR: 80 (24 Oct 2022 07:04) (80 - 973)  BP: 167/94 (24 Oct 2022 07:04) (114/61 - 167/94)  BP(mean): --  RR: 18 (24 Oct 2022 07:04) (18 - 18)  SpO2: --          Constitutional - [ x  ] NAD, Comfortable        [   ] other:  Chest - [ x   ] CTA     [   ] other:  Cardiovascular - [  x ] RRR, no murmer     [   ] other:  Abdomen - [ x  ] Soft, NT/ND      [   ] other:        - No KENNY CATHETER   [   ] YES  if yes: [   ] NO MEATAL TEAR OR DISCHARGE [   ] other:  Extremities - [ x ]  No C/C/E, No calf tenderness       [   ] other:  ROM - [ x  ] WFL     [   ] other: chronic arthritic changes both knees. Atrophy of intrinsic muscles of hands                                     Neurologic Exam -                 Cognitive - [   ]Awake, Alert, AAO to self, place, date, year, situation         [  x  ] other: oriented to month and year, not date. Knows President.       Communication - [ x  ]Fluent, No dysarthria       [   ] other:      Motor - No focal deficits                    Right UE -  [ x  ] WNL      [    ] other:                    Left UE -     [ x  ] WNL      [    ] other:                    Right LE -   [  x ] WNL       [    ] other:                    Left LE -      [x   ]WNL        [    ] other:      Sensory - [  x ] Intact to LT, burning dysesthesias both feet    [    ] other:          Reflexes - [ x  ] wnl/ symmetric     [   ] other:     Psychiatric - [ x  ]Mood stable, Affect WNL     [   ]other:     Skin - [  x ] intact      [   ] other    MEDICATIONS  (STANDING):  acetaminophen     Tablet .. 650 milliGRAM(s) Oral every 6 hours  atorvastatin 10 milliGRAM(s) Oral at bedtime  DULoxetine 30 milliGRAM(s) Oral every 12 hours  enoxaparin Injectable 40 milliGRAM(s) SubCutaneous every 24 hours  glycerin Suppository - Adult 1 Suppository(s) Rectal once  influenza  Vaccine (HIGH DOSE) 0.7 milliLiter(s) IntraMuscular once  levothyroxine 50 MICROGram(s) Oral daily  lidocaine   4% Patch 1 Patch Transdermal every 24 hours  losartan 100 milliGRAM(s) Oral daily  magnesium oxide 400 milliGRAM(s) Oral three times a day with meals  metoprolol tartrate 25 milliGRAM(s) Oral every 12 hours  NIFEdipine XL 30 milliGRAM(s) Oral daily  oxybutynin 5 milliGRAM(s) Oral every 12 hours  pantoprazole    Tablet 40 milliGRAM(s) Oral before breakfast  sodium chloride 3 Gram(s) Oral every 8 hours  warfarin 5 milliGRAM(s) Oral once    MEDICATIONS  (PRN):        RECENT LABS/IMAGING                          10.7   1.62  )-----------( 150      ( 24 Oct 2022 07:18 )             31.9     10-24    137  |  103  |  15  ----------------------------<  101<H>  3.9   |  27  |  0.5<L>    Ca    8.7      24 Oct 2022 07:18  Mg     1.8     10-24    TPro  5.8<L>  /  Alb  3.6  /  TBili  0.5  /  DBili  x   /  AST  17  /  ALT  20  /  AlkPhos  112  10-24    PT/INR - ( 24 Oct 2022 07:18 )   PT: 17.40 sec;   INR: 1.51 ratio             POCT Blood Glucose.: 115 mg/dL (10-22-22 @ 21:21)  POCT Blood Glucose.: 102 mg/dL (10-22-22 @ 16:44)  POCT Blood Glucose.: 108 mg/dL (10-21-22 @ 21:55)  POCT Blood Glucose.: 119 mg/dL (10-20-22 @ 21:09)  POCT Blood Glucose.: 109 mg/dL (10-20-22 @ 17:10)

## 2022-10-25 LAB
INR BLD: 1.66 RATIO — HIGH (ref 0.65–1.3)
PROTHROM AB SERPL-ACNC: 19.2 SEC — HIGH (ref 9.95–12.87)

## 2022-10-25 RX ORDER — SODIUM CHLORIDE 9 MG/ML
2 INJECTION INTRAMUSCULAR; INTRAVENOUS; SUBCUTANEOUS EVERY 8 HOURS
Refills: 0 | Status: DISCONTINUED | OUTPATIENT
Start: 2022-10-25 | End: 2022-10-31

## 2022-10-25 RX ORDER — WARFARIN SODIUM 2.5 MG/1
5 TABLET ORAL ONCE
Refills: 0 | Status: COMPLETED | OUTPATIENT
Start: 2022-10-25 | End: 2022-10-25

## 2022-10-25 RX ADMIN — Medication 650 MILLIGRAM(S): at 17:43

## 2022-10-25 RX ADMIN — ATORVASTATIN CALCIUM 10 MILLIGRAM(S): 80 TABLET, FILM COATED ORAL at 21:15

## 2022-10-25 RX ADMIN — PANTOPRAZOLE SODIUM 40 MILLIGRAM(S): 20 TABLET, DELAYED RELEASE ORAL at 12:24

## 2022-10-25 RX ADMIN — Medication 650 MILLIGRAM(S): at 17:42

## 2022-10-25 RX ADMIN — LIDOCAINE 1 PATCH: 4 CREAM TOPICAL at 21:09

## 2022-10-25 RX ADMIN — DULOXETINE HYDROCHLORIDE 30 MILLIGRAM(S): 30 CAPSULE, DELAYED RELEASE ORAL at 17:42

## 2022-10-25 RX ADMIN — LOSARTAN POTASSIUM 100 MILLIGRAM(S): 100 TABLET, FILM COATED ORAL at 05:48

## 2022-10-25 RX ADMIN — Medication 5 MILLIGRAM(S): at 05:48

## 2022-10-25 RX ADMIN — SODIUM CHLORIDE 2 GRAM(S): 9 INJECTION INTRAMUSCULAR; INTRAVENOUS; SUBCUTANEOUS at 21:09

## 2022-10-25 RX ADMIN — WARFARIN SODIUM 5 MILLIGRAM(S): 2.5 TABLET ORAL at 21:09

## 2022-10-25 RX ADMIN — Medication 650 MILLIGRAM(S): at 00:10

## 2022-10-25 RX ADMIN — ENOXAPARIN SODIUM 40 MILLIGRAM(S): 100 INJECTION SUBCUTANEOUS at 12:25

## 2022-10-25 RX ADMIN — MAGNESIUM OXIDE 400 MG ORAL TABLET 400 MILLIGRAM(S): 241.3 TABLET ORAL at 17:42

## 2022-10-25 RX ADMIN — Medication 30 MILLIGRAM(S): at 05:49

## 2022-10-25 RX ADMIN — Medication 5 MILLIGRAM(S): at 17:42

## 2022-10-25 RX ADMIN — SODIUM CHLORIDE 3 GRAM(S): 9 INJECTION INTRAMUSCULAR; INTRAVENOUS; SUBCUTANEOUS at 05:49

## 2022-10-25 RX ADMIN — Medication 25 MILLIGRAM(S): at 17:42

## 2022-10-25 RX ADMIN — Medication 650 MILLIGRAM(S): at 05:51

## 2022-10-25 RX ADMIN — MAGNESIUM OXIDE 400 MG ORAL TABLET 400 MILLIGRAM(S): 241.3 TABLET ORAL at 12:24

## 2022-10-25 RX ADMIN — Medication 50 MICROGRAM(S): at 05:49

## 2022-10-25 RX ADMIN — SODIUM CHLORIDE 2 GRAM(S): 9 INJECTION INTRAMUSCULAR; INTRAVENOUS; SUBCUTANEOUS at 17:42

## 2022-10-25 RX ADMIN — MAGNESIUM OXIDE 400 MG ORAL TABLET 400 MILLIGRAM(S): 241.3 TABLET ORAL at 12:20

## 2022-10-25 RX ADMIN — WARFARIN SODIUM 5 MILLIGRAM(S): 2.5 TABLET ORAL at 00:10

## 2022-10-25 RX ADMIN — Medication 650 MILLIGRAM(S): at 12:26

## 2022-10-25 RX ADMIN — Medication 650 MILLIGRAM(S): at 12:25

## 2022-10-25 RX ADMIN — DULOXETINE HYDROCHLORIDE 30 MILLIGRAM(S): 30 CAPSULE, DELAYED RELEASE ORAL at 05:49

## 2022-10-25 RX ADMIN — Medication 25 MILLIGRAM(S): at 05:51

## 2022-10-25 NOTE — PROGRESS NOTE ADULT - ASSESSMENT
ASSESSMENT/PLAN    #Rehab of decline in mobility and cognition, s/p fall c/w Metabolic Encephalopathy from Hyponatremia. She also has multiple rib fractures 7- 10 on the right with pain.   She notes confusion and weakness prior to her fall and neuro w/u was c/w vascular calcification (not SAH), and moderate generalized microvascular ischemia. She also has severe OA of both knees, further limiting her mobility and Peripheral Neuropathy of unclear etiology. She requires close medical supervision because of her hyponatremia currently being treated with fluid restriction and Sodium tablets. She requires and can tolerate 3 hrs a day of intensive interdisciplinary rehab including PT, OT and ST and Neuoropsych eval.     #Hyponatremia  - improving since admission, s/p IV saline, on fluid restriction and NaCl tabs 3 gms. tid.   - was on HCTZ on admission, stopped  - followed by Renal Medicine;  - monitor daily  - 127>125>134 > 137  - wean salt tabs    #Hypomagnesemia  - Continue supplementation and monitor    #Hypokalemia  - 3.5 > 3.9   - improved    #Peripheral Neuropathy  - dysesthesias of feet and atrophy of intrinsic muscles of hands  - unknown etiology  - was on Trileptal at home and now on Cymbalta    #History of Chronic Atrial Fibrillation  - rate controlled. Currently RRR  - Coumadin held prior to admission to rehab secondary to suspicion of SAH, now ruled out  - Resume coumadin home dose 5 mg daily and adjust for INR 2-3  - adjust coumadin according to daily INR  - INR 1.66 10/25. Continue home dose of 5 mg daily. Slowly increasing    #History of Myelodysplasia  - stable pancytopenia  - followed by Heme/Onc.   - On Vidaza every 28 days    #Osteoarthritis  - pain meds, modalities, ther-ex    #Chronic Microvascular Cerebrovascular Disease  - monitor    #HTN  - controlled on current meds    #HLD  - on statin    #[place]  - [place]ocket [employer]er  - consider outpatient Audiology eval    #Hypothyroidism  - on synthroid    #Pain control:   - rib fracture  - neuropathy pain  - Tylenol prn, Cymbalta    #GI/Bowel Mgmt: Contipation in hospital  - continue bowel meds and monitor    - Diet: Regular with salt tabs         Precautions / PROPHYLAXIS:      - Falls    - Ortho: Weight bearing status: WBAT      - DVT prophylaxis: coumadin (d/c Lovenox when INR therapeutic)

## 2022-10-25 NOTE — PROGRESS NOTE ADULT - ASSESSMENT
Neuropsychology Assessment:    Pain: Yes Location: Knees Ratin/10 Intervention: RN notified    Arousal Level: Alert    Behavior: Pleasant/Cooperative    Affect Range: WFL    Attention: WFL    Insight into illness/deficits: Good    Tests Administered: MOCA    Therapy session also focused on Mood/ Coping/ Psychoeducation      Visuospatial/Executive Function: Performance on visuospatial/motor tasks evidenced difficulties. Deficits were observed on the verbal number-letter switching which suggest weakness in set shifting. Further deficits in visuospatial and motor tasks were observed on the clock drawing task. The patient’s contour of the clock was drawn as a square, and she was unable to accurately label the numbers and hands.    Attention: She was able to repeat 3/5 words on the first trial on repetition; on the following trial she was able to repeat 5/5 words. Her performance on forward digits, and backwards and vigilance were wfl. Further difficulties were observed on the serial 7 subtraction task; she was able to correctly perform 2 subtractions.    Language: Deficits were observed in naming and sentence repetition. The patient was able to name 1/3 images. On the sentence repetition task, the patient appeared to have difficulty verbalizing portions of each sentence. On each sentence trial, the sentence structure was intact, but parts of her speech appeared slurred. Word finding difficulties were observed    Abstraction: Deficits were observed on similarity task.?    Memory: Deficits in delayed memory were observed. Patient was able to spontaneously recall 0 out of five words after a 5-minute delay; she gained 3 from cueing, and 2 words was unable to be recalled. Suggesting difficulties with memory recall.    Orientation: WFL    Summary: The patient’s scored a 13/30 suggesting moderate cognitive changes. Overall, her cognitive profile suggested weaknesses in delayed memory and executive, in particular areas of cognitive flexibility, processing, visuospatial, word finding and memory.   Will continue to monitor cognitive functioning and progress.    The patient was also seen to support positive coping and mood therapy.    During today's session, the patient denied mood changes. However, she acknowledged experiencing anxiety. She talked about feeling like a tenseness in her body, when she begins to worry about her progress in treatment and her life post-discharge.    Patient was provided with support and cognitive behavioral therapy to address her anxiety. Patient talked about using breathing techniques to help calm her and create relaxation. Patient was encouraged to continuing practicing breathing exercises. She was also provided with additional coping techniques and psychoeducation about healthy adaptive techniques to manage and overcome anxiety.    Recommendations:??    Support positive coping and cognitive assessment?    Goal:? Feedback of results to MD, Treatment Team, Patient and Family??    Plan:  Further Cognitive Assessment    Length of Current Session: Patient Contact:  60 minutes

## 2022-10-25 NOTE — PROGRESS NOTE ADULT - SUBJECTIVE AND OBJECTIVE BOX
Patient is a 87y old  Female who presents with a chief complaint of Metabolic Encephalopathy with decline in function (21 Oct 2022 11:53)      HPI:  Patient is a 88 y/o female with PMHx of MDS ( on Vidaza- gets every 28 days- just finished course- due in around 3 weeks) HTN, HLD, A-Fib ( On Coumadin 5mg daily), hypothyroid, neuropathy of B/L LE, Trigeminal Neuralgia ( Prior Gamma knife procedure for pain), B/L OA of the knees, prior CCY who presented to [facility] s/p fall at home. Patient was ambulating at home with the walker when she fell onto the right side. She doesn't have the best recollection of the event but denies LOC. She has a home health aide and her grandson was also home at the time. She was able to stand and walk but presented as notes ongoing dizziness and pain over her right chest. Pain over the right side is dull, worse with motion, but not sharp. She notes dizziness notable after fall but not prior. With the dizziness she gets occasional nausea. Prior to fall she denies any change in medications, new medications, recent illness, nor any other features out different from her baseline .  Patient noted on workup to have right sided fracture of the 7-10th right ribs without displacement. On Incentive she can do around 500 but without respiratory distress or hemodynamic instability. She was also noted on CT to have a small right sub arachnoid hemorrhage. Admitted to surgery and will have a SICU consult with possible SICU admission due to trauma burden. She was also noted to have a sodium of 120 and was on HCTZ. She has had a slow, improvement in her Na+ to 126 on fluid restriction and Na+ tabs. Neuro w/u revealed her SAH to be secondary to vascular calcification with a normal MRA and MRV and no further w/u recommended.     She is medically stable, but states that she has been confused and weak since about 1 week PTA. She requires CG/ min assist for transfers and CG for ambulation with a RW 40 feet only secondary to impaired endurance and mod assistance for LE dressing.   PTA, she live with her daughter and had a HHA 8 hrs. a day, 5 days a week. She reports being able to transfer and ambulate with a RW with supervision PTA and being able to dress herself. She was evaluated by me on the Trauma Service and was found to be a good acute inpatient rehab candidate.  (20 Oct 2022 13:56)    TODAY'S SUBJECTIVE & REVIEW OF SYMPTOMS:    Seen this morning. No new complaints. Denies pain. Participating in rehab. Na improved to 137. INR is 1.6, patient will receive PTA coumadin 5mg       Constiutional:    [ x  ] WNL, [place]           [   ] poor appetite   [   ] insomnia   [   ] tired     Cardio:                [ x  ] WNL           [   ] CP   [   ] BROWN   [   ] palpitations               Resp:                   [  x ] WNL           [   ] SOB   [   ] cough   [   ] wheezing   GI:                        [ x  ] WNL           [   ] constipation   [   ] diarrhea   [   ] abdominal pain   [   ] nausea   [   ] emesis                                :                      [x   ] WNL           [   ] KENNY  [   ] dysuria   [   ] difficulty voiding             Endo:                   [ x  ] WNL          [   ] polyuria   [   ] temperature intolerance                 Skin:                     [ x  ] WNL          [   ] pain   [   ] wound   [   ] rash   MSK:                    [   ] WNL          [   ] muscle pain   [ x  ] joint pain/ stiffness   [   ] muscle tenderness   [   ] swelling   Neuro:                 [   ] WNL          [   ] HA   [   ] change in vision   [   ] tremor   [x   ] weakness   [   ]dysphagia              Cognitive:           [  x ] WNL           [   ]confusion      Psych:                  [   ] WNL           [   ] hallucinations   [   ]agitation   [   ] delusion   [   ]depression [ x] anxiety      PHYSICAL EXAM    Vital Signs Last 24 Hrs  T(C): 36 (25 Oct 2022 05:30), Max: 36.3 (24 Oct 2022 21:24)  T(F): 96.8 (25 Oct 2022 05:30), Max: 97.4 (24 Oct 2022 21:24)  HR: 106 (25 Oct 2022 05:30) (86 - 106)  BP: 129/80 (25 Oct 2022 05:30) (115/69 - 142/74)  BP(mean): --  RR: 18 (25 Oct 2022 05:30) (18 - 20)  SpO2: --          Constitutional - [ x  ] NAD, Comfortable        [   ] other:  Chest - [ x   ] CTA     [   ] other:  Cardiovascular - [  x ] RRR, no murmer     [   ] other:  Abdomen - [ x  ] Soft, NT/ND      [   ] other:        - No KENNY CATHETER   [   ] YES  if yes: [   ] NO MEATAL TEAR OR DISCHARGE [   ] other:  Extremities - [ x ]  No C/C/E, No calf tenderness       [   ] other:  ROM - [ x  ] WFL     [   ] other: chronic arthritic changes both knees. Atrophy of intrinsic muscles of hands                                     Neurologic Exam -                 Cognitive - [   ]Awake, Alert, AAO to self, place, date, year, situation         [  x  ] other: oriented to month and year, not date. Knows President.       Communication - [ x  ]Fluent, No dysarthria       [   ] other:      Motor - No focal deficits                    Right UE -  [ x  ] WNL      [    ] other:                    Left UE -     [ x  ] WNL      [    ] other:                    Right LE -   [  x ] WNL       [    ] other:                    Left LE -      [x   ]WNL        [    ] other:      Sensory - [  x ] Intact to LT, burning dysesthesias both feet    [    ] other:          Reflexes - [ x  ] wnl/ symmetric     [   ] other:     Psychiatric - [ x  ]Mood stable, Affect WNL     [   ]other:     Skin - [  x ] intact      [   ] other    MEDICATIONS  (STANDING):  acetaminophen     Tablet .. 650 milliGRAM(s) Oral every 6 hours  atorvastatin 10 milliGRAM(s) Oral at bedtime  DULoxetine 30 milliGRAM(s) Oral every 12 hours  enoxaparin Injectable 40 milliGRAM(s) SubCutaneous every 24 hours  glycerin Suppository - Adult 1 Suppository(s) Rectal once  influenza  Vaccine (HIGH DOSE) 0.7 milliLiter(s) IntraMuscular once  levothyroxine 50 MICROGram(s) Oral daily  lidocaine   4% Patch 1 Patch Transdermal every 24 hours  losartan 100 milliGRAM(s) Oral daily  magnesium oxide 400 milliGRAM(s) Oral three times a day with meals  metoprolol tartrate 25 milliGRAM(s) Oral every 12 hours  NIFEdipine XL 30 milliGRAM(s) Oral daily  oxybutynin 5 milliGRAM(s) Oral every 12 hours  pantoprazole    Tablet 40 milliGRAM(s) Oral before breakfast  sodium chloride 3 Gram(s) Oral every 8 hours    MEDICATIONS  (PRN):        RECENT LABS/IMAGING                          10.7   1.62  )-----------( 150      ( 24 Oct 2022 07:18 )             31.9     10-24    137  |  103  |  15  ----------------------------<  101<H>  3.9   |  27  |  0.5<L>    Ca    8.7      24 Oct 2022 07:18  Mg     1.8     10-24    TPro  5.8<L>  /  Alb  3.6  /  TBili  0.5  /  DBili  x   /  AST  17  /  ALT  20  /  AlkPhos  112  10-24    PT/INR - ( 24 Oct 2022 07:18 )   PT: 17.40 sec;   INR: 1.51 ratio             POCT Blood Glucose.: 115 mg/dL (10-22-22 @ 21:21)  POCT Blood Glucose.: 102 mg/dL (10-22-22 @ 16:44)  POCT Blood Glucose.: 108 mg/dL (10-21-22 @ 21:55)  POCT Blood Glucose.: 119 mg/dL (10-20-22 @ 21:09)  POCT Blood Glucose.: 109 mg/dL (10-20-22 @ 17:10)     Patient is a 87y old  Female who presents with a chief complaint of Metabolic Encephalopathy with decline in function (21 Oct 2022 11:53)      HPI:  Patient is a 86 y/o female with PMHx of MDS ( on Vidaza- gets every 28 days- just finished course- due in around 3 weeks) HTN, HLD, A-Fib ( On Coumadin 5mg daily), hypothyroid, neuropathy of B/L LE, Trigeminal Neuralgia ( Prior Gamma knife procedure for pain), B/L OA of the knees, prior CCY who presented to [facility] s/p fall at home. Patient was ambulating at home with the walker when she fell onto the right side. She doesn't have the best recollection of the event but denies LOC. She has a home health aide and her grandson was also home at the time. She was able to stand and walk but presented as notes ongoing dizziness and pain over her right chest. Pain over the right side is dull, worse with motion, but not sharp. She notes dizziness notable after fall but not prior. With the dizziness she gets occasional nausea. Prior to fall she denies any change in medications, new medications, recent illness, nor any other features out different from her baseline .  Patient noted on workup to have right sided fracture of the 7-10th right ribs without displacement. On Incentive she can do around 500 but without respiratory distress or hemodynamic instability. She was also noted on CT to have a small right sub arachnoid hemorrhage. Admitted to surgery and will have a SICU consult with possible SICU admission due to trauma burden. She was also noted to have a sodium of 120 and was on HCTZ. She has had a slow, improvement in her Na+ to 126 on fluid restriction and Na+ tabs. Neuro w/u revealed her SAH to be secondary to vascular calcification with a normal MRA and MRV and no further w/u recommended.     She is medically stable, but states that she has been confused and weak since about 1 week PTA. She requires CG/ min assist for transfers and CG for ambulation with a RW 40 feet only secondary to impaired endurance and mod assistance for LE dressing.   PTA, she live with her daughter and had a HHA 8 hrs. a day, 5 days a week. She reports being able to transfer and ambulate with a RW with supervision PTA and being able to dress herself. She was evaluated by me on the Trauma Service and was found to be a good acute inpatient rehab candidate.  (20 Oct 2022 13:56)    TODAY'S SUBJECTIVE & REVIEW OF SYMPTOMS:    Seen this morning. No new complaints. Denies pain. Participating in rehab. Na improved to 137. INR is 1.6, patient will receive PTA coumadin 5mg    CLOF: bed mobility supervision/TA , Ambulating 75' RW TA      Constiutional:    [ x  ] WNL, [place]           [   ] poor appetite   [   ] insomnia   [   ] tired     Cardio:                [ x  ] WNL           [   ] CP   [   ] BROWN   [   ] palpitations               Resp:                   [  x ] WNL           [   ] SOB   [   ] cough   [   ] wheezing   GI:                        [ x  ] WNL           [   ] constipation   [   ] diarrhea   [   ] abdominal pain   [   ] nausea   [   ] emesis                                :                      [x   ] WNL           [   ] KENNY  [   ] dysuria   [   ] difficulty voiding             Endo:                   [ x  ] WNL          [   ] polyuria   [   ] temperature intolerance                 Skin:                     [ x  ] WNL          [   ] pain   [   ] wound   [   ] rash   MSK:                    [   ] WNL          [   ] muscle pain   [ x  ] joint pain/ stiffness   [   ] muscle tenderness   [   ] swelling   Neuro:                 [   ] WNL          [   ] HA   [   ] change in vision   [   ] tremor   [x   ] weakness   [   ]dysphagia              Cognitive:           [  x ] WNL           [   ]confusion      Psych:                  [   ] WNL           [   ] hallucinations   [   ]agitation   [   ] delusion   [   ]depression [ x] anxiety      PHYSICAL EXAM    Vital Signs Last 24 Hrs  T(C): 36 (25 Oct 2022 05:30), Max: 36.3 (24 Oct 2022 21:24)  T(F): 96.8 (25 Oct 2022 05:30), Max: 97.4 (24 Oct 2022 21:24)  HR: 106 (25 Oct 2022 05:30) (86 - 106)  BP: 129/80 (25 Oct 2022 05:30) (115/69 - 142/74)  BP(mean): --  RR: 18 (25 Oct 2022 05:30) (18 - 20)  SpO2: --          Constitutional - [ x  ] NAD, Comfortable        [   ] other:  Chest - [ x   ] CTA     [   ] other:  Cardiovascular - [  x ] RRR, no murmer     [   ] other:  Abdomen - [ x  ] Soft, NT/ND      [   ] other:        - No KENNY CATHETER   [   ] YES  if yes: [   ] NO MEATAL TEAR OR DISCHARGE [   ] other:  Extremities - [ x ]  No C/C/E, No calf tenderness       [   ] other:  ROM - [ x  ] WFL     [   ] other: chronic arthritic changes both knees. Atrophy of intrinsic muscles of hands                                     Neurologic Exam -                 Cognitive - [   ]Awake, Alert, AAO to self, place, date, year, situation         [  x  ] other: oriented to month and year, not date. Knows President.       Communication - [ x  ]Fluent, No dysarthria       [   ] other:      Motor - No focal deficits                    Right UE -  [ x  ] WNL      [    ] other:                    Left UE -     [ x  ] WNL      [    ] other:                    Right LE -   [  x ] WNL       [    ] other:                    Left LE -      [x   ]WNL        [    ] other:      Sensory - [  x ] Intact to LT, burning dysesthesias both feet    [    ] other:          Reflexes - [ x  ] wnl/ symmetric     [   ] other:     Psychiatric - [ x  ]Mood stable, Affect WNL     [   ]other:     Skin - [  x ] intact      [   ] other    MEDICATIONS  (STANDING):  acetaminophen     Tablet .. 650 milliGRAM(s) Oral every 6 hours  atorvastatin 10 milliGRAM(s) Oral at bedtime  DULoxetine 30 milliGRAM(s) Oral every 12 hours  enoxaparin Injectable 40 milliGRAM(s) SubCutaneous every 24 hours  glycerin Suppository - Adult 1 Suppository(s) Rectal once  influenza  Vaccine (HIGH DOSE) 0.7 milliLiter(s) IntraMuscular once  levothyroxine 50 MICROGram(s) Oral daily  lidocaine   4% Patch 1 Patch Transdermal every 24 hours  losartan 100 milliGRAM(s) Oral daily  magnesium oxide 400 milliGRAM(s) Oral three times a day with meals  metoprolol tartrate 25 milliGRAM(s) Oral every 12 hours  NIFEdipine XL 30 milliGRAM(s) Oral daily  oxybutynin 5 milliGRAM(s) Oral every 12 hours  pantoprazole    Tablet 40 milliGRAM(s) Oral before breakfast  sodium chloride 3 Gram(s) Oral every 8 hours    MEDICATIONS  (PRN):        RECENT LABS/IMAGING                          10.7   1.62  )-----------( 150      ( 24 Oct 2022 07:18 )             31.9     10-24    137  |  103  |  15  ----------------------------<  101<H>  3.9   |  27  |  0.5<L>    Ca    8.7      24 Oct 2022 07:18  Mg     1.8     10-24    TPro  5.8<L>  /  Alb  3.6  /  TBili  0.5  /  DBili  x   /  AST  17  /  ALT  20  /  AlkPhos  112  10-24    PT/INR - ( 24 Oct 2022 07:18 )   PT: 17.40 sec;   INR: 1.51 ratio             POCT Blood Glucose.: 115 mg/dL (10-22-22 @ 21:21)  POCT Blood Glucose.: 102 mg/dL (10-22-22 @ 16:44)  POCT Blood Glucose.: 108 mg/dL (10-21-22 @ 21:55)  POCT Blood Glucose.: 119 mg/dL (10-20-22 @ 21:09)  POCT Blood Glucose.: 109 mg/dL (10-20-22 @ 17:10)     Patient is a 87y old  Female who presents with a chief complaint of Metabolic Encephalopathy with decline in function (21 Oct 2022 11:53)      HPI:  Patient is a 88 y/o female with PMHx of MDS ( on Vidaza- gets every 28 days- just finished course- due in around 3 weeks) HTN, HLD, A-Fib ( On Coumadin 5mg daily), hypothyroid, neuropathy of B/L LE, Trigeminal Neuralgia ( Prior Gamma knife procedure for pain), B/L OA of the knees, prior CCY who presented to [facility] s/p fall at home. Patient was ambulating at home with the walker when she fell onto the right side. She doesn't have the best recollection of the event but denies LOC. She has a home health aide and her grandson was also home at the time. She was able to stand and walk but presented as notes ongoing dizziness and pain over her right chest. Pain over the right side is dull, worse with motion, but not sharp. She notes dizziness notable after fall but not prior. With the dizziness she gets occasional nausea. Prior to fall she denies any change in medications, new medications, recent illness, nor any other features out different from her baseline .  Patient noted on workup to have right sided fracture of the 7-10th right ribs without displacement. On Incentive she can do around 500 but without respiratory distress or hemodynamic instability. She was also noted on CT to have a small right sub arachnoid hemorrhage. Admitted to surgery and will have a SICU consult with possible SICU admission due to trauma burden. She was also noted to have a sodium of 120 and was on HCTZ. She has had a slow, improvement in her Na+ to 126 on fluid restriction and Na+ tabs. Neuro w/u revealed her SAH to be secondary to vascular calcification with a normal MRA and MRV and no further w/u recommended.     She is medically stable, but states that she has been confused and weak since about 1 week PTA. She requires CG/ min assist for transfers and CG for ambulation with a RW 40 feet only secondary to impaired endurance and mod assistance for LE dressing.   PTA, she live with her daughter and had a HHA 8 hrs. a day, 5 days a week. She reports being able to transfer and ambulate with a RW with supervision PTA and being able to dress herself. She was evaluated by me on the Trauma Service and was found to be a good acute inpatient rehab candidate.  (20 Oct 2022 13:56)    TODAY'S SUBJECTIVE & REVIEW OF SYMPTOMS:    Seen this morning. No new complaints. Denies pain. Participating in rehab. Na improved to 137. INR is 1.6, patient will receive PTA coumadin 5mg    CLOF: bed mobility supervision/TA , Ambulating 75' RW TA      Constiutional:    [ x  ] WNL        [   ] poor appetite   [   ] insomnia   [   ] tired      ENT:                                             [ x ]  , [place]      Cardio:                [ x  ] WNL           [   ] CP   [   ] BROWN   [   ] palpitations               Resp:                   [  x ] WNL           [   ] SOB   [   ] cough   [   ] wheezing   GI:                        [ x  ] WNL           [   ] constipation   [   ] diarrhea   [   ] abdominal pain   [   ] nausea   [   ] emesis                                :                      [x   ] WNL           [   ] KENNY  [   ] dysuria   [   ] difficulty voiding             Endo:                   [ x  ] WNL          [   ] polyuria   [   ] temperature intolerance                 Skin:                     [ x  ] WNL          [   ] pain   [   ] wound   [   ] rash   MSK:                    [   ] WNL          [   ] muscle pain   [ x  ] joint pain/ stiffness both knees   [   ] muscle tenderness   [   ] swelling   Neuro:                 [   ] WNL          [   ] HA   [   ] change in vision   [   ] tremor   [x   ] weakness   [   ]dysphagia              Cognitive:           [  x ] WNL           [   ]confusion      Psych:                  [   ] WNL           [   ] hallucinations   [   ]agitation   [   ] delusion   [   ]depression [ x] anxiety      PHYSICAL EXAM    Vital Signs Last 24 Hrs  T(C): 36 (25 Oct 2022 05:30), Max: 36.3 (24 Oct 2022 21:24)  T(F): 96.8 (25 Oct 2022 05:30), Max: 97.4 (24 Oct 2022 21:24)  HR: 106 (25 Oct 2022 05:30) (86 - 106)  BP: 129/80 (25 Oct 2022 05:30) (115/69 - 142/74)  BP(mean): --  RR: 18 (25 Oct 2022 05:30) (18 - 20)  SpO2: --          Constitutional - [ x  ] NAD, Comfortable        [   ] other:  Chest - [ x   ] CTA     [   ] other:  Cardiovascular - [  x ] RRR, no murmer     [   ] other:  Abdomen - [ x  ] Soft, NT/ND      [   ] other:        - [ x ]  No KENNY CATHETER   [   ] YES  if yes: [   ] NO MEATAL TEAR OR DISCHARGE [   ] other:  Extremities - [ x ]  No C/C/E, No calf tenderness       [   ] other:  ROM - [ x  ] WFL     [   ] other: chronic arthritic changes both knees. Atrophy of intrinsic muscles of hands                                     Neurologic Exam -                 Cognitive - [   ]Awake, Alert, AAO to self, place, date, year, situation         [  x  ] other: oriented to month and year, not date. Knows President.       Communication - [ x  ]Fluent, No dysarthria       [   ] other:      Motor - No focal deficits                    Right UE -  [ x  ] WNL      [    ] other:                    Left UE -     [ x  ] WNL      [    ] other:                    Right LE -   [  x ] WNL       [    ] other:                    Left LE -      [x   ]WNL        [    ] other:      Sensory - [  x ] Intact to LT, burning dysesthesias both feet    [    ] other:          Reflexes - [ x  ] wnl/ symmetric     [   ] other:     Psychiatric - [ x  ]Mood stable, Affect WNL     [   ]other:     Skin - [  x ] intact      [   ] other    MEDICATIONS  (STANDING):  acetaminophen     Tablet .. 650 milliGRAM(s) Oral every 6 hours  atorvastatin 10 milliGRAM(s) Oral at bedtime  DULoxetine 30 milliGRAM(s) Oral every 12 hours  enoxaparin Injectable 40 milliGRAM(s) SubCutaneous every 24 hours  glycerin Suppository - Adult 1 Suppository(s) Rectal once  influenza  Vaccine (HIGH DOSE) 0.7 milliLiter(s) IntraMuscular once  levothyroxine 50 MICROGram(s) Oral daily  lidocaine   4% Patch 1 Patch Transdermal every 24 hours  losartan 100 milliGRAM(s) Oral daily  magnesium oxide 400 milliGRAM(s) Oral three times a day with meals  metoprolol tartrate 25 milliGRAM(s) Oral every 12 hours  NIFEdipine XL 30 milliGRAM(s) Oral daily  oxybutynin 5 milliGRAM(s) Oral every 12 hours  pantoprazole    Tablet 40 milliGRAM(s) Oral before breakfast  sodium chloride 3 Gram(s) Oral every 8 hours    MEDICATIONS  (PRN):        RECENT LABS/IMAGING                          10.7   1.62  )-----------( 150      ( 24 Oct 2022 07:18 )             31.9     10-24    137  |  103  |  15  ----------------------------<  101<H>  3.9   |  27  |  0.5<L>    Ca    8.7      24 Oct 2022 07:18  Mg     1.8     10-24    TPro  5.8<L>  /  Alb  3.6  /  TBili  0.5  /  DBili  x   /  AST  17  /  ALT  20  /  AlkPhos  112  10-24    PT/INR - ( 24 Oct 2022 07:18 )   PT: 17.40 sec;   INR: 1.51 ratio         CAPILLARY BLOOD GLUCOSE      POCT Blood Glucose.: 89 mg/dL (24 Oct 2022 21:57)  POCT Blood Glucose.: 90 mg/dL (24 Oct 2022 16:48)    POCT Blood Glucose.: 115 mg/dL (10-22-22 @ 21:21)  POCT Blood Glucose.: 102 mg/dL (10-22-22 @ 16:44)  POCT Blood Glucose.: 108 mg/dL (10-21-22 @ 21:55)  POCT Blood Glucose.: 119 mg/dL (10-20-22 @ 21:09)  POCT Blood Glucose.: 109 mg/dL (10-20-22 @ 17:10)

## 2022-10-26 LAB
INR BLD: 2.03 RATIO — HIGH (ref 0.65–1.3)
PROTHROM AB SERPL-ACNC: 23.6 SEC — HIGH (ref 9.95–12.87)

## 2022-10-26 RX ORDER — POLYETHYLENE GLYCOL 3350 17 G/17G
17 POWDER, FOR SOLUTION ORAL
Refills: 0 | Status: DISCONTINUED | OUTPATIENT
Start: 2022-10-26 | End: 2022-11-02

## 2022-10-26 RX ORDER — WARFARIN SODIUM 2.5 MG/1
2.5 TABLET ORAL ONCE
Refills: 0 | Status: DISCONTINUED | OUTPATIENT
Start: 2022-10-26 | End: 2022-10-26

## 2022-10-26 RX ORDER — WARFARIN SODIUM 2.5 MG/1
4 TABLET ORAL ONCE
Refills: 0 | Status: COMPLETED | OUTPATIENT
Start: 2022-10-26 | End: 2022-10-26

## 2022-10-26 RX ORDER — SENNA PLUS 8.6 MG/1
1 TABLET ORAL AT BEDTIME
Refills: 0 | Status: DISCONTINUED | OUTPATIENT
Start: 2022-10-26 | End: 2022-11-02

## 2022-10-26 RX ORDER — MAGNESIUM HYDROXIDE 400 MG/1
30 TABLET, CHEWABLE ORAL DAILY
Refills: 0 | Status: DISCONTINUED | OUTPATIENT
Start: 2022-10-26 | End: 2022-11-02

## 2022-10-26 RX ADMIN — LIDOCAINE 1 PATCH: 4 CREAM TOPICAL at 06:41

## 2022-10-26 RX ADMIN — SODIUM CHLORIDE 2 GRAM(S): 9 INJECTION INTRAMUSCULAR; INTRAVENOUS; SUBCUTANEOUS at 05:15

## 2022-10-26 RX ADMIN — Medication 5 MILLIGRAM(S): at 17:59

## 2022-10-26 RX ADMIN — MAGNESIUM OXIDE 400 MG ORAL TABLET 400 MILLIGRAM(S): 241.3 TABLET ORAL at 17:59

## 2022-10-26 RX ADMIN — PANTOPRAZOLE SODIUM 40 MILLIGRAM(S): 20 TABLET, DELAYED RELEASE ORAL at 05:14

## 2022-10-26 RX ADMIN — Medication 650 MILLIGRAM(S): at 17:59

## 2022-10-26 RX ADMIN — Medication 25 MILLIGRAM(S): at 05:14

## 2022-10-26 RX ADMIN — SODIUM CHLORIDE 2 GRAM(S): 9 INJECTION INTRAMUSCULAR; INTRAVENOUS; SUBCUTANEOUS at 21:26

## 2022-10-26 RX ADMIN — WARFARIN SODIUM 4 MILLIGRAM(S): 2.5 TABLET ORAL at 21:26

## 2022-10-26 RX ADMIN — Medication 650 MILLIGRAM(S): at 05:14

## 2022-10-26 RX ADMIN — Medication 50 MICROGRAM(S): at 05:14

## 2022-10-26 RX ADMIN — Medication 25 MILLIGRAM(S): at 17:59

## 2022-10-26 RX ADMIN — SENNA PLUS 1 TABLET(S): 8.6 TABLET ORAL at 22:13

## 2022-10-26 RX ADMIN — SODIUM CHLORIDE 2 GRAM(S): 9 INJECTION INTRAMUSCULAR; INTRAVENOUS; SUBCUTANEOUS at 15:14

## 2022-10-26 RX ADMIN — ENOXAPARIN SODIUM 40 MILLIGRAM(S): 100 INJECTION SUBCUTANEOUS at 12:25

## 2022-10-26 RX ADMIN — Medication 650 MILLIGRAM(S): at 12:24

## 2022-10-26 RX ADMIN — POLYETHYLENE GLYCOL 3350 17 GRAM(S): 17 POWDER, FOR SOLUTION ORAL at 19:10

## 2022-10-26 RX ADMIN — ATORVASTATIN CALCIUM 10 MILLIGRAM(S): 80 TABLET, FILM COATED ORAL at 21:26

## 2022-10-26 RX ADMIN — MAGNESIUM OXIDE 400 MG ORAL TABLET 400 MILLIGRAM(S): 241.3 TABLET ORAL at 08:23

## 2022-10-26 RX ADMIN — Medication 5 MILLIGRAM(S): at 05:14

## 2022-10-26 RX ADMIN — MAGNESIUM OXIDE 400 MG ORAL TABLET 400 MILLIGRAM(S): 241.3 TABLET ORAL at 12:24

## 2022-10-26 RX ADMIN — Medication 30 MILLIGRAM(S): at 05:14

## 2022-10-26 RX ADMIN — DULOXETINE HYDROCHLORIDE 30 MILLIGRAM(S): 30 CAPSULE, DELAYED RELEASE ORAL at 17:59

## 2022-10-26 RX ADMIN — MAGNESIUM HYDROXIDE 30 MILLILITER(S): 400 TABLET, CHEWABLE ORAL at 17:59

## 2022-10-26 RX ADMIN — Medication 650 MILLIGRAM(S): at 05:45

## 2022-10-26 RX ADMIN — Medication 650 MILLIGRAM(S): at 13:11

## 2022-10-26 RX ADMIN — LOSARTAN POTASSIUM 100 MILLIGRAM(S): 100 TABLET, FILM COATED ORAL at 05:13

## 2022-10-26 RX ADMIN — DULOXETINE HYDROCHLORIDE 30 MILLIGRAM(S): 30 CAPSULE, DELAYED RELEASE ORAL at 05:14

## 2022-10-26 RX ADMIN — LIDOCAINE 1 PATCH: 4 CREAM TOPICAL at 23:14

## 2022-10-26 NOTE — PROGRESS NOTE ADULT - ASSESSMENT
ASSESSMENT/PLAN    #Rehab of decline in mobility and cognition, s/p fall c/w Metabolic Encephalopathy from Hyponatremia. She also has multiple rib fractures 7- 10 on the right with pain.   She notes confusion and weakness prior to her fall and neuro w/u was c/w vascular calcification (not SAH), and moderate generalized microvascular ischemia. She also has severe OA of both knees, further limiting her mobility and Peripheral Neuropathy of unclear etiology. She requires close medical supervision because of her hyponatremia currently being treated with fluid restriction and Sodium tablets. She requires and can tolerate 3 hrs a day of intensive interdisciplinary rehab including PT, OT and ST and Neuoropsych eval.     #Hyponatremia  - improving since admission, s/p IV saline, on fluid restriction and NaCl tabs 3 gms. tid.   - was on Trileptal, HCTZ on admission, stopped  - followed by Renal Medicine;  - monitor daily  - 127>125>134 > 137  - wean salt tabs    #Hypomagnesemia  - Continue supplementation and monitor    #Hypokalemia  - 3.5 > 3.9   - improved    #Peripheral Neuropathy  - dysesthesias of feet and atrophy of intrinsic muscles of hands  - unknown etiology  - was on Trileptal at home and now on Cymbalta    #History of Chronic Atrial Fibrillation  - rate controlled. Currently RRR  - Coumadin held prior to admission to rehab secondary to suspicion of SAH, now ruled out  - Resume coumadin home dose 5 mg daily and adjust for INR 2-3  - adjust coumadin according to daily INR  - INR 2.03 10/26. Continue home dose of 5 mg. Slowly increasing    #History of Myelodysplasia  - stable pancytopenia  - followed by Heme/Onc.   - On Vidaza every 28 days    #Osteoarthritis  - pain meds, modalities, ther-ex    #Chronic Microvascular Cerebrovascular Disease  - monitor    #HTN  - controlled on current meds    #HLD  - on statin    #[place]  - [employer]  - consider outpatient Audiology eval    #Hypothyroidism  - on synthroid    #Pain control:   - rib fracture  - neuropathy pain  - Tylenol prn, Cymbalta    #GI/Bowel Mgmt: Contipation in hospital  - continue bowel meds and monitor    - Diet: Regular with salt tabs         Precautions / PROPHYLAXIS:      - Falls    - Ortho: Weight bearing status: WBAT      - DVT prophylaxis: coumadin (d/c Lovenox when INR therapeutic)     ASSESSMENT/PLAN    #Rehab of decline in mobility and cognition, s/p fall c/w Metabolic Encephalopathy from Hyponatremia. She also has multiple rib fractures 7- 10 on the right with pain.   She notes confusion and weakness prior to her fall and neuro w/u was c/w vascular calcification (not SAH), and moderate generalized microvascular ischemia. She also has severe OA of both knees, further limiting her mobility and Peripheral Neuropathy of unclear etiology. She requires close medical supervision because of her hyponatremia currently being treated with fluid restriction and Sodium tablets. She requires and can tolerate 3 hrs a day of intensive interdisciplinary rehab including PT, OT and ST and Neuoropsych eval.     #Hyponatremia  - improving since admission, s/p IV saline, on fluid restriction and NaCl tabs 3 gms. tid.   - was on Trileptal, HCTZ on admission, stopped  - followed by Renal Medicine;  - monitor daily  - 127>125>134 > 137  - wean salt tabs    # BLE calf pain  - tender to touch, left worse than right  - f/u dopplers     #constipation   - chronic problem  - senna daily, miralax BID   - suppository PRN    #Hypomagnesemia  - Continue supplementation and monitor    #Hypokalemia  - 3.5 > 3.9   - improved    #Peripheral Neuropathy  - dysesthesias of feet and atrophy of intrinsic muscles of hands  - unknown etiology  - was on Trileptal at home and now on Cymbalta    #History of Chronic Atrial Fibrillation  - rate controlled. Currently RRR  - Coumadin held prior to admission to rehab secondary to suspicion of SAH, now ruled out  - Resume coumadin home dose 5 mg daily and adjust for INR 2-3  - adjust coumadin according to daily INR  - INR 2.03 10/26. Continue home dose of 5 mg. Slowly increasing    #History of Myelodysplasia  - stable pancytopenia  - followed by Heme/Onc.   - On Vidaza every 28 days     #Osteoarthritis  - pain meds, modalities, ther-ex    #Chronic Microvascular Cerebrovascular Disease  - monitor    #HTN  - controlled on current meds    #HLD  - on statin    #[place]  - [employer]  - consider outpatient Audiology eval    #Hypothyroidism  - on synthroid    #Pain control:   - rib fracture  - neuropathy pain  - Tylenol prn, Cymbalta    #GI/Bowel Mgmt: Contipation in hospital  - continue bowel meds and monitor    - Diet: Regular with salt tabs         Precautions / PROPHYLAXIS:      - Falls    - Ortho: Weight bearing status: WBAT      - DVT prophylaxis: coumadin (d/c Lovenox when INR therapeutic)     ASSESSMENT/PLAN    #Rehab of decline in mobility and cognition, s/p fall c/w Metabolic Encephalopathy from Hyponatremia. She also has multiple rib fractures 7- 10 on the right with pain.   She notes confusion and weakness prior to her fall and neuro w/u was c/w vascular calcification (not SAH), and moderate generalized microvascular ischemia. She also has severe OA of both knees, further limiting her mobility and Peripheral Neuropathy of unclear etiology. She requires close medical supervision because of her hyponatremia currently being treated with fluid restriction and Sodium tablets. She requires and can tolerate 3 hrs a day of intensive interdisciplinary rehab including PT, OT and ST and Neuoropsych eval.     #Hyponatremia  - improving since admission, s/p IV saline, on fluid restriction and NaCl tabs 3 gms. tid.   - was on Trileptal, HCTZ on admission, stopped  - followed by Renal Medicine;  - monitor daily  - 127>125>134 > 137  - wean salt tabs    # BLE calf pain  - tender to touch, left worse than right  - f/u dopplers     #constipation   - chronic problem  - senna daily, miralax BID   - suppository PRN    #Hypomagnesemia  - Continue supplementation and monitor    #Hypokalemia  - 3.5 > 3.9   - improved    #Peripheral Neuropathy  - dysesthesias of feet and atrophy of intrinsic muscles of hands  - unknown etiology  - was on Trileptal at home and now on Cymbalta    #History of Chronic Atrial Fibrillation  - rate controlled. Currently RRR  - Coumadin held prior to admission to rehab secondary to suspicion of SAH, now ruled out  - Resume coumadin home dose 5 mg daily and adjust for INR 2-3  - adjust coumadin according to daily INR  - INR 2.03 10/26. will give half dose of coumadin 2.5 mg.  - if maintained in therapeutic range, can discontinue lovenox tomorrow     #History of Myelodysplasia  - stable pancytopenia  - followed by Heme/Onc.   - On Vidaza every 28 days     #Osteoarthritis  - pain meds, modalities, ther-ex    #Chronic Microvascular Cerebrovascular Disease  - monitor    #HTN  - controlled on current meds    #HLD  - on statin    #[place]  - [employer]  - consider outpatient Audiology eval    #Hypothyroidism  - on synthroid    #Pain control:   - rib fracture  - neuropathy pain  - Tylenol prn, Cymbalta    #GI/Bowel Mgmt: Contipation in hospital  - continue bowel meds and monitor    - Diet: Regular with salt tabs         Precautions / PROPHYLAXIS:      - Falls    - Ortho: Weight bearing status: WBAT      - DVT prophylaxis: coumadin (d/c Lovenox when INR therapeutic)     ASSESSMENT/PLAN    #Rehab of decline in mobility and cognition, s/p fall c/w Metabolic Encephalopathy from Hyponatremia. She also has multiple rib fractures 7- 10 on the right with pain.   She notes confusion and weakness prior to her fall and neuro w/u was c/w vascular calcification (not SAH), and moderate generalized microvascular ischemia. She also has severe OA of both knees, further limiting her mobility and Peripheral Neuropathy of unclear etiology. She requires close medical supervision because of her hyponatremia currently being treated with fluid restriction and Sodium tablets. She requires and can tolerate 3 hrs a day of intensive interdisciplinary rehab including PT, OT and ST and Neuoropsych eval.     #Hyponatremia  - improving since admission, s/p IV saline, on fluid restriction and NaCl tabs 3 gms. tid.   - was on Trileptal, HCTZ on admission, stopped  - followed by Renal Medicine;  - monitor daily  - 127>125>134 > 137  - wean salt tabs    # BLE calf pain  - tender to touch, left worse than right  - on coumadin already for afib  - f/u dopplers     #constipation   - chronic problem  - senna daily, miralax BID, dulcolax daily   - suppository PRN    #Hypomagnesemia  - Continue supplementation and monitor    #Hypokalemia  - 3.5 > 3.9   - improved    #Peripheral Neuropathy  - dysesthesias of feet and atrophy of intrinsic muscles of hands  - unknown etiology  - was on Trileptal at home and now on Cymbalta    #History of Chronic Atrial Fibrillation  - rate controlled. Currently RRR  - Coumadin held prior to admission to rehab secondary to suspicion of SAH, now ruled out  - Resume coumadin home dose 5 mg daily and adjust for INR 2-3  - adjust coumadin according to daily INR  - INR 2.03 10/26. will give 4mg coumadin  - if maintained in therapeutic range, can discontinue lovenox tomorrow     #History of Myelodysplasia  - stable pancytopenia  - followed by Heme/Onc.   - On Vidaza every 28 days     #Osteoarthritis  - pain meds, modalities, ther-ex    #Chronic Microvascular Cerebrovascular Disease  - monitor    #HTN  - controlled on current meds    #HLD  - on statin    #[place]  - [employer]  - consider outpatient Audiology eval    #Hypothyroidism  - on synthroid    #Pain control:   - rib fracture  - neuropathy pain  - Tylenol prn, Cymbalta    #GI/Bowel Mgmt: Contipation in hospital  - continue bowel meds and monitor    - Diet: Regular with salt tabs         Precautions / PROPHYLAXIS:      - Falls    - Ortho: Weight bearing status: WBAT      - DVT prophylaxis: coumadin (d/c Lovenox when INR therapeutic)

## 2022-10-26 NOTE — PROGRESS NOTE ADULT - SUBJECTIVE AND OBJECTIVE BOX
Patient is a 87y old  Female who presents with a chief complaint of Metabolic Encephalopathy with decline in function (21 Oct 2022 11:53)      HPI:  Patient is a 86 y/o female with PMHx of MDS ( on Vidaza- gets every 28 days- just finished course- due in around 3 weeks) HTN, HLD, A-Fib ( On Coumadin 5mg daily), hypothyroid, neuropathy of B/L LE, Trigeminal Neuralgia ( Prior Gamma knife procedure for pain), B/L OA of the knees, prior CCY who presented to [facility] s/p fall at home. Patient was ambulating at home with the walker when she fell onto the right side. She doesn't have the best recollection of the event but denies LOC. She has a home health aide and her grandson was also home at the time. She was able to stand and walk but presented as notes ongoing dizziness and pain over her right chest. Pain over the right side is dull, worse with motion, but not sharp. She notes dizziness notable after fall but not prior. With the dizziness she gets occasional nausea. Prior to fall she denies any change in medications, new medications, recent illness, nor any other features out different from her baseline .  Patient noted on workup to have right sided fracture of the 7-10th right ribs without displacement. On Incentive she can do around 500 but without respiratory distress or hemodynamic instability. She was also noted on CT to have a small right sub arachnoid hemorrhage. Admitted to surgery and will have a SICU consult with possible SICU admission due to trauma burden. She was also noted to have a sodium of 120 and was on HCTZ. She has had a slow, improvement in her Na+ to 126 on fluid restriction and Na+ tabs. Neuro w/u revealed her SAH to be secondary to vascular calcification with a normal MRA and MRV and no further w/u recommended.     She is medically stable, but states that she has been confused and weak since about 1 week PTA. She requires CG/ min assist for transfers and CG for ambulation with a RW 40 feet only secondary to impaired endurance and mod assistance for LE dressing.   PTA, she live with her daughter and had a HHA 8 hrs. a day, 5 days a week. She reports being able to transfer and ambulate with a RW with supervision PTA and being able to dress herself. She was evaluated by me on the Trauma Service and was found to be a good acute inpatient rehab candidate.  (20 Oct 2022 13:56)    TODAY'S SUBJECTIVE & REVIEW OF SYMPTOMS:    Seen this morning. No new complaints. Denies pain. Participating in rehab. Na improved to 137. INR is 2.03.     CLOF: bed mobility supervision/TA , Ambulating 75' RW TA      Constiutional:    [ x  ] WNL        [   ] poor appetite   [   ] insomnia   [   ] tired      ENT:                                             [ x ]  , [place]      Cardio:                [ x  ] WNL           [   ] CP   [   ] BROWN   [   ] palpitations               Resp:                   [  x ] WNL           [   ] SOB   [   ] cough   [   ] wheezing   GI:                        [ x  ] WNL           [   ] constipation   [   ] diarrhea   [   ] abdominal pain   [   ] nausea   [   ] emesis                                :                      [x   ] WNL           [   ] KENNY  [   ] dysuria   [   ] difficulty voiding             Endo:                   [ x  ] WNL          [   ] polyuria   [   ] temperature intolerance                 Skin:                     [ x  ] WNL          [   ] pain   [   ] wound   [   ] rash   MSK:                    [   ] WNL          [   ] muscle pain   [ x  ] joint pain/ stiffness both knees   [   ] muscle tenderness   [   ] swelling   Neuro:                 [   ] WNL          [   ] HA   [   ] change in vision   [   ] tremor   [x   ] weakness   [   ]dysphagia              Cognitive:           [  x ] WNL           [   ]confusion      Psych:                  [   ] WNL           [   ] hallucinations   [   ]agitation   [   ] delusion   [   ]depression [ x] anxiety      PHYSICAL EXAM    Vital Signs Last 24 Hrs  T(C): 36.6 (26 Oct 2022 05:01), Max: 37 (25 Oct 2022 12:55)  T(F): 97.9 (26 Oct 2022 05:01), Max: 98.6 (25 Oct 2022 12:55)  HR: 101 (26 Oct 2022 05:01) (80 - 101)  BP: 161/88 (26 Oct 2022 05:01) (136/63 - 161/88)  BP(mean): --  RR: 18 (26 Oct 2022 05:01) (18 - 18)  SpO2: --              Constitutional - [ x  ] NAD, Comfortable        [   ] other:  Chest - [ x   ] CTA     [   ] other:  Cardiovascular - [  x ] RRR, no murmer     [   ] other:  Abdomen - [ x  ] Soft, NT/ND      [   ] other:        - [ x ]  No KENNY CATHETER   [   ] YES  if yes: [   ] NO MEATAL TEAR OR DISCHARGE [   ] other:  Extremities - [ x ]  No C/C/E, No calf tenderness       [   ] other:  ROM - [ x  ] WFL     [   ] other: chronic arthritic changes both knees. Atrophy of intrinsic muscles of hands                                     Neurologic Exam -                 Cognitive - [   ]Awake, Alert, AAO to self, place, date, year, situation         [  x  ] other: oriented to month and year, not date. Knows President.       Communication - [ x  ]Fluent, No dysarthria       [   ] other:      Motor - No focal deficits                    Right UE -  [ x  ] WNL      [    ] other:                    Left UE -     [ x  ] WNL      [    ] other:                    Right LE -   [  x ] WNL       [    ] other:                    Left LE -      [x   ]WNL        [    ] other:      Sensory - [  x ] Intact to LT, burning dysesthesias both feet    [    ] other:          Reflexes - [ x  ] wnl/ symmetric     [   ] other:     Psychiatric - [ x  ]Mood stable, Affect WNL     [   ]other:     Skin - [  x ] intact      [   ] other    MEDICATIONS  (STANDING):  acetaminophen     Tablet .. 650 milliGRAM(s) Oral every 6 hours  atorvastatin 10 milliGRAM(s) Oral at bedtime  DULoxetine 30 milliGRAM(s) Oral every 12 hours  enoxaparin Injectable 40 milliGRAM(s) SubCutaneous every 24 hours  glycerin Suppository - Adult 1 Suppository(s) Rectal once  influenza  Vaccine (HIGH DOSE) 0.7 milliLiter(s) IntraMuscular once  levothyroxine 50 MICROGram(s) Oral daily  lidocaine   4% Patch 1 Patch Transdermal every 24 hours  losartan 100 milliGRAM(s) Oral daily  magnesium oxide 400 milliGRAM(s) Oral three times a day with meals  metoprolol tartrate 25 milliGRAM(s) Oral every 12 hours  NIFEdipine XL 30 milliGRAM(s) Oral daily  oxybutynin 5 milliGRAM(s) Oral every 12 hours  pantoprazole    Tablet 40 milliGRAM(s) Oral before breakfast  sodium chloride 2 Gram(s) Oral every 8 hours    RECENT LABS/IMAGING                          10.7   1.62  )-----------( 150      ( 24 Oct 2022 07:18 )             31.9     10-24    137  |  103  |  15  ----------------------------<  101<H>  3.9   |  27  |  0.5<L>    Ca    8.7      24 Oct 2022 07:18  Mg     1.8     10-24    TPro  5.8<L>  /  Alb  3.6  /  TBili  0.5  /  DBili  x   /  AST  17  /  ALT  20  /  AlkPhos  112  10-24    PT/INR - ( 24 Oct 2022 07:18 )   PT: 17.40 sec;   INR: 1.51 ratio         CAPILLARY BLOOD GLUCOSE      POCT Blood Glucose.: 89 mg/dL (24 Oct 2022 21:57)  POCT Blood Glucose.: 90 mg/dL (24 Oct 2022 16:48)    POCT Blood Glucose.: 115 mg/dL (10-22-22 @ 21:21)  POCT Blood Glucose.: 102 mg/dL (10-22-22 @ 16:44)  POCT Blood Glucose.: 108 mg/dL (10-21-22 @ 21:55)  POCT Blood Glucose.: 119 mg/dL (10-20-22 @ 21:09)  POCT Blood Glucose.: 109 mg/dL (10-20-22 @ 17:10)     Patient is a 87y old  Female who presents with a chief complaint of Metabolic Encephalopathy with decline in function (21 Oct 2022 11:53)      HPI:  Patient is a 86 y/o female with PMHx of MDS ( on Vidaza- gets every 28 days- just finished course- due in around 3 weeks) HTN, HLD, A-Fib ( On Coumadin 5mg daily), hypothyroid, neuropathy of B/L LE, Trigeminal Neuralgia ( Prior Gamma knife procedure for pain), B/L OA of the knees, prior CCY who presented to [facility] s/p fall at home. Patient was ambulating at home with the walker when she fell onto the right side. She doesn't have the best recollection of the event but denies LOC. She has a home health aide and her grandson was also home at the time. She was able to stand and walk but presented as notes ongoing dizziness and pain over her right chest. Pain over the right side is dull, worse with motion, but not sharp. She notes dizziness notable after fall but not prior. With the dizziness she gets occasional nausea. Prior to fall she denies any change in medications, new medications, recent illness, nor any other features out different from her baseline .  Patient noted on workup to have right sided fracture of the 7-10th right ribs without displacement. On Incentive she can do around 500 but without respiratory distress or hemodynamic instability. She was also noted on CT to have a small right sub arachnoid hemorrhage. Admitted to surgery and will have a SICU consult with possible SICU admission due to trauma burden. She was also noted to have a sodium of 120 and was on HCTZ. She has had a slow, improvement in her Na+ to 126 on fluid restriction and Na+ tabs. Neuro w/u revealed her SAH to be secondary to vascular calcification with a normal MRA and MRV and no further w/u recommended.     She is medically stable, but states that she has been confused and weak since about 1 week PTA. She requires CG/ min assist for transfers and CG for ambulation with a RW 40 feet only secondary to impaired endurance and mod assistance for LE dressing.   PTA, she live with her daughter and had a HHA 8 hrs. a day, 5 days a week. She reports being able to transfer and ambulate with a RW with supervision PTA and being able to dress herself. She was evaluated by me on the Trauma Service and was found to be a good acute inpatient rehab candidate.  (20 Oct 2022 13:56)    TODAY'S SUBJECTIVE & REVIEW OF SYMPTOMS:    Seen this morning. No new complaints. Denies pain. Participating in rehab. Na improved to 137. INR is 2.03. Patient states this morning she choked on medication pills because she was swallowing them too fast. also reports her left knee is painful from arthritis and it buckled during therapy when she was walking. also has not had a BM in days. she has constipation issues even at home, and requests medication to help move her bowels.     CLOF: bed mobility TA, transfers CS  , Ambulating 150' RW TA      Constiutional:    [ x  ] WNL        [   ] poor appetite   [   ] insomnia   [   ] tired      ENT:                                             [ x ]  , [place]      Cardio:                [ x  ] WNL           [   ] CP   [   ] BROWN   [   ] palpitations               Resp:                   [  x ] WNL           [   ] SOB   [   ] cough   [   ] wheezing   GI:                        [ x  ] WNL           [   ] constipation   [   ] diarrhea   [   ] abdominal pain   [   ] nausea   [   ] emesis                                :                      [x   ] WNL           [   ] KENNY  [   ] dysuria   [   ] difficulty voiding             Endo:                   [ x  ] WNL          [   ] polyuria   [   ] temperature intolerance                 Skin:                     [ x  ] WNL          [   ] pain   [   ] wound   [   ] rash   MSK:                    [   ] WNL          [   ] muscle pain   [ x  ] joint pain/ stiffness both knees   [   ] muscle tenderness   [   ] swelling   Neuro:                 [   ] WNL          [   ] HA   [   ] change in vision   [   ] tremor   [x   ] weakness   [   ]dysphagia              Cognitive:           [  x ] WNL           [   ]confusion      Psych:                  [   ] WNL           [   ] hallucinations   [   ]agitation   [   ] delusion   [   ]depression [ x] anxiety      PHYSICAL EXAM    Vital Signs Last 24 Hrs  T(C): 36.6 (26 Oct 2022 05:01), Max: 37 (25 Oct 2022 12:55)  T(F): 97.9 (26 Oct 2022 05:01), Max: 98.6 (25 Oct 2022 12:55)  HR: 101 (26 Oct 2022 05:01) (80 - 101)  BP: 161/88 (26 Oct 2022 05:01) (136/63 - 161/88)  BP(mean): --  RR: 18 (26 Oct 2022 05:01) (18 - 18)  SpO2: --              Constitutional - [ x  ] NAD, Comfortable        [   ] other:  Chest - [ x   ] CTA     [   ] other:  Cardiovascular - [  x ] RRR, no murmer     [   ] other:  Abdomen - [ x  ] Soft, NT/ND      [   ] other:        - [ x ]  No KENNY CATHETER   [   ] YES  if yes: [   ] NO MEATAL TEAR OR DISCHARGE [   ] other:  Extremities - [  ]  No C/C/E, No calf tenderness       [   ] other: mild calf tenderness in BLE L worse than R, mild swelling, no erythema   ROM - [ x  ] WFL     [   ] other: chronic arthritic changes both knees. Atrophy of intrinsic muscles of hands                                     Neurologic Exam -                 Cognitive - [   ]Awake, Alert, AAO to self, place, date, year, situation         [  x  ] other: oriented to month and year, not date. Knows President.       Communication - [ x  ]Fluent, No dysarthria       [   ] other:      Motor - No focal deficits                    Right UE -  [ x  ] WNL      [    ] other:                    Left UE -     [ x  ] WNL      [    ] other:                    Right LE -   [  x ] WNL       [    ] other:                    Left LE -      [x   ]WNL        [    ] other:      Sensory - [  x ] Intact to LT, burning dysesthesias both feet    [    ] other:          Reflexes - [ x  ] wnl/ symmetric     [   ] other:     Psychiatric - [ x  ]Mood stable, Affect WNL     [   ]other:     Skin - [  x ] intact      [   ] other  MEDICATIONS  (STANDING):  acetaminophen     Tablet .. 650 milliGRAM(s) Oral every 6 hours  atorvastatin 10 milliGRAM(s) Oral at bedtime  DULoxetine 30 milliGRAM(s) Oral every 12 hours  enoxaparin Injectable 40 milliGRAM(s) SubCutaneous every 24 hours  glycerin Suppository - Adult 1 Suppository(s) Rectal once  influenza  Vaccine (HIGH DOSE) 0.7 milliLiter(s) IntraMuscular once  levothyroxine 50 MICROGram(s) Oral daily  lidocaine   4% Patch 1 Patch Transdermal every 24 hours  losartan 100 milliGRAM(s) Oral daily  magnesium oxide 400 milliGRAM(s) Oral three times a day with meals  metoprolol tartrate 25 milliGRAM(s) Oral every 12 hours  NIFEdipine XL 30 milliGRAM(s) Oral daily  oxybutynin 5 milliGRAM(s) Oral every 12 hours  pantoprazole    Tablet 40 milliGRAM(s) Oral before breakfast  polyethylene glycol 3350 17 Gram(s) Oral two times a day  senna 1 Tablet(s) Oral at bedtime  sodium chloride 2 Gram(s) Oral every 8 hours    MEDICATIONS  (PRN):      RECENT LABS/IMAGING                          10.7   1.62  )-----------( 150      ( 24 Oct 2022 07:18 )             31.9     10-24    137  |  103  |  15  ----------------------------<  101<H>  3.9   |  27  |  0.5<L>    Ca    8.7      24 Oct 2022 07:18  Mg     1.8     10-24    TPro  5.8<L>  /  Alb  3.6  /  TBili  0.5  /  DBili  x   /  AST  17  /  ALT  20  /  AlkPhos  112  10-24    PT/INR - ( 24 Oct 2022 07:18 )   PT: 17.40 sec;   INR: 1.51 ratio         CAPILLARY BLOOD GLUCOSE      POCT Blood Glucose.: 89 mg/dL (24 Oct 2022 21:57)  POCT Blood Glucose.: 90 mg/dL (24 Oct 2022 16:48)    POCT Blood Glucose.: 115 mg/dL (10-22-22 @ 21:21)  POCT Blood Glucose.: 102 mg/dL (10-22-22 @ 16:44)  POCT Blood Glucose.: 108 mg/dL (10-21-22 @ 21:55)  POCT Blood Glucose.: 119 mg/dL (10-20-22 @ 21:09)  POCT Blood Glucose.: 109 mg/dL (10-20-22 @ 17:10)     Patient is a 87y old  Female who presents with a chief complaint of Metabolic Encephalopathy with decline in function (21 Oct 2022 11:53)      HPI:  Patient is a 88 y/o female with PMHx of MDS ( on Vidaza- gets every 28 days- just finished course- due in around 3 weeks) HTN, HLD, A-Fib ( On Coumadin 5mg daily), hypothyroid, neuropathy of B/L LE, Trigeminal Neuralgia ( Prior Gamma knife procedure for pain), B/L OA of the knees, prior CCY who presented to [facility] s/p fall at home. Patient was ambulating at home with the walker when she fell onto the right side. She doesn't have the best recollection of the event but denies LOC. She has a home health aide and her grandson was also home at the time. She was able to stand and walk but presented as notes ongoing dizziness and pain over her right chest. Pain over the right side is dull, worse with motion, but not sharp. She notes dizziness notable after fall but not prior. With the dizziness she gets occasional nausea. Prior to fall she denies any change in medications, new medications, recent illness, nor any other features out different from her baseline .  Patient noted on workup to have right sided fracture of the 7-10th right ribs without displacement. On Incentive she can do around 500 but without respiratory distress or hemodynamic instability. She was also noted on CT to have a small right sub arachnoid hemorrhage. Admitted to surgery and will have a SICU consult with possible SICU admission due to trauma burden. She was also noted to have a sodium of 120 and was on HCTZ. She has had a slow, improvement in her Na+ to 126 on fluid restriction and Na+ tabs. Neuro w/u revealed her SAH to be secondary to vascular calcification with a normal MRA and MRV and no further w/u recommended.     She is medically stable, but states that she has been confused and weak since about 1 week PTA. She requires CG/ min assist for transfers and CG for ambulation with a RW 40 feet only secondary to impaired endurance and mod assistance for LE dressing.   PTA, she live with her daughter and had a HHA 8 hrs. a day, 5 days a week. She reports being able to transfer and ambulate with a RW with supervision PTA and being able to dress herself. She was evaluated by me on the Trauma Service and was found to be a good acute inpatient rehab candidate.  (20 Oct 2022 13:56)    TODAY'S SUBJECTIVE & REVIEW OF SYMPTOMS:    Seen this morning. No new complaints. Denies pain. Participating in rehab. Na improved to 137. INR is 2.03. will give 2.5 mg coumadin (half dose) Patient states this morning she choked on medication pills because she was swallowing them too fast. also reports her left knee is painful from arthritis and it buckled during therapy when she was walking. also has not had a BM in days. she has constipation issues even at home, and requests medication to help move her bowels.     CLOF: bed mobility TA, transfers CS  , Ambulating 150' RW TA      Constiutional:    [ x  ] WNL        [   ] poor appetite   [   ] insomnia   [   ] tired      ENT:                                             [ x ]  , [place]      Cardio:                [ x  ] WNL           [   ] CP   [   ] BROWN   [   ] palpitations               Resp:                   [  x ] WNL           [   ] SOB   [   ] cough   [   ] wheezing   GI:                        [ x  ] WNL           [   ] constipation   [   ] diarrhea   [   ] abdominal pain   [   ] nausea   [   ] emesis                                :                      [x   ] WNL           [   ] KENNY  [   ] dysuria   [   ] difficulty voiding             Endo:                   [ x  ] WNL          [   ] polyuria   [   ] temperature intolerance                 Skin:                     [ x  ] WNL          [   ] pain   [   ] wound   [   ] rash   MSK:                    [   ] WNL          [   ] muscle pain   [ x  ] joint pain/ stiffness both knees   [   ] muscle tenderness   [   ] swelling   Neuro:                 [   ] WNL          [   ] HA   [   ] change in vision   [   ] tremor   [x   ] weakness   [   ]dysphagia              Cognitive:           [  x ] WNL           [   ]confusion      Psych:                  [   ] WNL           [   ] hallucinations   [   ]agitation   [   ] delusion   [   ]depression [ x] anxiety      PHYSICAL EXAM    Vital Signs Last 24 Hrs  T(C): 36.6 (26 Oct 2022 05:01), Max: 37 (25 Oct 2022 12:55)  T(F): 97.9 (26 Oct 2022 05:01), Max: 98.6 (25 Oct 2022 12:55)  HR: 101 (26 Oct 2022 05:01) (80 - 101)  BP: 161/88 (26 Oct 2022 05:01) (136/63 - 161/88)  BP(mean): --  RR: 18 (26 Oct 2022 05:01) (18 - 18)  SpO2: --              Constitutional - [ x  ] NAD, Comfortable        [   ] other:  Chest - [ x   ] CTA     [   ] other:  Cardiovascular - [  x ] RRR, no murmer     [   ] other:  Abdomen - [ x  ] Soft, NT/ND      [   ] other:        - [ x ]  No KENNY CATHETER   [   ] YES  if yes: [   ] NO MEATAL TEAR OR DISCHARGE [   ] other:  Extremities - [  ]  No C/C/E, No calf tenderness       [   ] other: mild calf tenderness in BLE L worse than R, mild swelling, no erythema   ROM - [ x  ] WFL     [   ] other: chronic arthritic changes both knees. Atrophy of intrinsic muscles of hands                                     Neurologic Exam -                 Cognitive - [   ]Awake, Alert, AAO to self, place, date, year, situation         [  x  ] other: oriented to month and year, not date. Knows President.       Communication - [ x  ]Fluent, No dysarthria       [   ] other:      Motor - No focal deficits                    Right UE -  [ x  ] WNL      [    ] other:                    Left UE -     [ x  ] WNL      [    ] other:                    Right LE -   [  x ] WNL       [    ] other:                    Left LE -      [x   ]WNL        [    ] other:      Sensory - [  x ] Intact to LT, burning dysesthesias both feet    [    ] other:          Reflexes - [ x  ] wnl/ symmetric     [   ] other:     Psychiatric - [ x  ]Mood stable, Affect WNL     [   ]other:     Skin - [  x ] intact      [   ] other  MEDICATIONS  (STANDING):  acetaminophen     Tablet .. 650 milliGRAM(s) Oral every 6 hours  atorvastatin 10 milliGRAM(s) Oral at bedtime  DULoxetine 30 milliGRAM(s) Oral every 12 hours  enoxaparin Injectable 40 milliGRAM(s) SubCutaneous every 24 hours  glycerin Suppository - Adult 1 Suppository(s) Rectal once  influenza  Vaccine (HIGH DOSE) 0.7 milliLiter(s) IntraMuscular once  levothyroxine 50 MICROGram(s) Oral daily  lidocaine   4% Patch 1 Patch Transdermal every 24 hours  losartan 100 milliGRAM(s) Oral daily  magnesium oxide 400 milliGRAM(s) Oral three times a day with meals  metoprolol tartrate 25 milliGRAM(s) Oral every 12 hours  NIFEdipine XL 30 milliGRAM(s) Oral daily  oxybutynin 5 milliGRAM(s) Oral every 12 hours  pantoprazole    Tablet 40 milliGRAM(s) Oral before breakfast  polyethylene glycol 3350 17 Gram(s) Oral two times a day  senna 1 Tablet(s) Oral at bedtime  sodium chloride 2 Gram(s) Oral every 8 hours    MEDICATIONS  (PRN):      RECENT LABS/IMAGING                          10.7   1.62  )-----------( 150      ( 24 Oct 2022 07:18 )             31.9     10-24    137  |  103  |  15  ----------------------------<  101<H>  3.9   |  27  |  0.5<L>    Ca    8.7      24 Oct 2022 07:18  Mg     1.8     10-24    TPro  5.8<L>  /  Alb  3.6  /  TBili  0.5  /  DBili  x   /  AST  17  /  ALT  20  /  AlkPhos  112  10-24    PT/INR - ( 24 Oct 2022 07:18 )   PT: 17.40 sec;   INR: 1.51 ratio         CAPILLARY BLOOD GLUCOSE      POCT Blood Glucose.: 89 mg/dL (24 Oct 2022 21:57)  POCT Blood Glucose.: 90 mg/dL (24 Oct 2022 16:48)    POCT Blood Glucose.: 115 mg/dL (10-22-22 @ 21:21)  POCT Blood Glucose.: 102 mg/dL (10-22-22 @ 16:44)  POCT Blood Glucose.: 108 mg/dL (10-21-22 @ 21:55)  POCT Blood Glucose.: 119 mg/dL (10-20-22 @ 21:09)  POCT Blood Glucose.: 109 mg/dL (10-20-22 @ 17:10)     Patient is a 87y old  Female who presents with a chief complaint of Metabolic Encephalopathy with decline in function (21 Oct 2022 11:53)      HPI:  Patient is a 86 y/o female with PMHx of MDS ( on Vidaza- gets every 28 days- just finished course- due in around 3 weeks) HTN, HLD, A-Fib ( On Coumadin 5mg daily), hypothyroid, neuropathy of B/L LE, Trigeminal Neuralgia ( Prior Gamma knife procedure for pain), B/L OA of the knees, prior CCY who presented to [facility] s/p fall at home. Patient was ambulating at home with the walker when she fell onto the right side. She doesn't have the best recollection of the event but denies LOC. She has a home health aide and her grandson was also home at the time. She was able to stand and walk but presented as notes ongoing dizziness and pain over her right chest. Pain over the right side is dull, worse with motion, but not sharp. She notes dizziness notable after fall but not prior. With the dizziness she gets occasional nausea. Prior to fall she denies any change in medications, new medications, recent illness, nor any other features out different from her baseline .  Patient noted on workup to have right sided fracture of the 7-10th right ribs without displacement. On Incentive she can do around 500 but without respiratory distress or hemodynamic instability. She was also noted on CT to have a small right sub arachnoid hemorrhage. Admitted to surgery and will have a SICU consult with possible SICU admission due to trauma burden. She was also noted to have a sodium of 120 and was on HCTZ. She has had a slow, improvement in her Na+ to 126 on fluid restriction and Na+ tabs. Neuro w/u revealed her SAH to be secondary to vascular calcification with a normal MRA and MRV and no further w/u recommended.     She is medically stable, but states that she has been confused and weak since about 1 week PTA. She requires CG/ min assist for transfers and CG for ambulation with a RW 40 feet only secondary to impaired endurance and mod assistance for LE dressing.   PTA, she live with her daughter and had a HHA 8 hrs. a day, 5 days a week. She reports being able to transfer and ambulate with a RW with supervision PTA and being able to dress herself. She was evaluated by me on the Trauma Service and was found to be a good acute inpatient rehab candidate.  (20 Oct 2022 13:56)    TODAY'S SUBJECTIVE & REVIEW OF SYMPTOMS:    Seen this morning. No new complaints. Denies pain. Participating in rehab. Na improved to 137. INR is 2.03. will give4 mg coumadin (home dose is 5mg) Patient states this morning she choked on medication pills because she was swallowing them too fast. also reports her left knee is painful from arthritis and it buckled during therapy when she was walking. also has not had a BM in days. she has constipation issues even at home, and requests medication to help move her bowels.     CLOF: bed mobility TA, transfers CS  , Ambulating 150' RW TA      Constiutional:    [ x  ] WNL        [   ] poor appetite   [   ] insomnia   [   ] tired      ENT:                                             [ x ]  , [place]      Cardio:                [ x  ] WNL           [   ] CP   [   ] BROWN   [   ] palpitations               Resp:                   [  x ] WNL           [   ] SOB   [   ] cough   [   ] wheezing   GI:                        [ x  ] WNL           [   ] constipation   [   ] diarrhea   [   ] abdominal pain   [   ] nausea   [   ] emesis                                :                      [x   ] WNL           [   ] KENNY  [   ] dysuria   [   ] difficulty voiding             Endo:                   [ x  ] WNL          [   ] polyuria   [   ] temperature intolerance                 Skin:                     [ x  ] WNL          [   ] pain   [   ] wound   [   ] rash   MSK:                    [   ] WNL          [   ] muscle pain   [ x  ] joint pain/ stiffness both knees   [   ] muscle tenderness   [   ] swelling   Neuro:                 [   ] WNL          [   ] HA   [   ] change in vision   [   ] tremor   [x   ] weakness   [   ]dysphagia              Cognitive:           [  x ] WNL           [   ]confusion      Psych:                  [   ] WNL           [   ] hallucinations   [   ]agitation   [   ] delusion   [   ]depression [ x] anxiety      PHYSICAL EXAM    Vital Signs Last 24 Hrs  T(C): 36.6 (26 Oct 2022 05:01), Max: 37 (25 Oct 2022 12:55)  T(F): 97.9 (26 Oct 2022 05:01), Max: 98.6 (25 Oct 2022 12:55)  HR: 101 (26 Oct 2022 05:01) (80 - 101)  BP: 161/88 (26 Oct 2022 05:01) (136/63 - 161/88)  BP(mean): --  RR: 18 (26 Oct 2022 05:01) (18 - 18)  SpO2: --              Constitutional - [ x  ] NAD, Comfortable        [   ] other:  Chest - [ x   ] CTA     [   ] other:  Cardiovascular - [  x ] RRR, no murmer     [   ] other:  Abdomen - [ x  ] Soft, NT/ND      [   ] other:        - [ x ]  No KENNY CATHETER   [   ] YES  if yes: [   ] NO MEATAL TEAR OR DISCHARGE [   ] other:  Extremities - [  ]  No C/C/E, No calf tenderness       [   ] other: mild calf tenderness in BLE L worse than R, mild swelling, no erythema   ROM - [ x  ] WFL     [   ] other: chronic arthritic changes both knees. Atrophy of intrinsic muscles of hands                                     Neurologic Exam -                 Cognitive - [   ]Awake, Alert, AAO to self, place, date, year, situation         [  x  ] other: oriented to month and year, not date. Knows President.       Communication - [ x  ]Fluent, No dysarthria       [   ] other:      Motor - No focal deficits                    Right UE -  [ x  ] WNL      [    ] other:                    Left UE -     [ x  ] WNL      [    ] other:                    Right LE -   [  x ] WNL       [    ] other:                    Left LE -      [x   ]WNL        [    ] other:      Sensory - [  x ] Intact to LT, burning dysesthesias both feet    [    ] other:          Reflexes - [ x  ] wnl/ symmetric     [   ] other:     Psychiatric - [ x  ]Mood stable, Affect WNL     [   ]other:     Skin - [  x ] intact      [   ] other  MEDICATIONS  (STANDING):  acetaminophen     Tablet .. 650 milliGRAM(s) Oral every 6 hours  atorvastatin 10 milliGRAM(s) Oral at bedtime  DULoxetine 30 milliGRAM(s) Oral every 12 hours  enoxaparin Injectable 40 milliGRAM(s) SubCutaneous every 24 hours  glycerin Suppository - Adult 1 Suppository(s) Rectal once  influenza  Vaccine (HIGH DOSE) 0.7 milliLiter(s) IntraMuscular once  levothyroxine 50 MICROGram(s) Oral daily  lidocaine   4% Patch 1 Patch Transdermal every 24 hours  losartan 100 milliGRAM(s) Oral daily  magnesium oxide 400 milliGRAM(s) Oral three times a day with meals  metoprolol tartrate 25 milliGRAM(s) Oral every 12 hours  NIFEdipine XL 30 milliGRAM(s) Oral daily  oxybutynin 5 milliGRAM(s) Oral every 12 hours  pantoprazole    Tablet 40 milliGRAM(s) Oral before breakfast  polyethylene glycol 3350 17 Gram(s) Oral two times a day  senna 1 Tablet(s) Oral at bedtime  sodium chloride 2 Gram(s) Oral every 8 hours    MEDICATIONS  (PRN):      RECENT LABS/IMAGING                          10.7   1.62  )-----------( 150      ( 24 Oct 2022 07:18 )             31.9     10-24    137  |  103  |  15  ----------------------------<  101<H>  3.9   |  27  |  0.5<L>    Ca    8.7      24 Oct 2022 07:18  Mg     1.8     10-24    TPro  5.8<L>  /  Alb  3.6  /  TBili  0.5  /  DBili  x   /  AST  17  /  ALT  20  /  AlkPhos  112  10-24    PT/INR - ( 24 Oct 2022 07:18 )   PT: 17.40 sec;   INR: 1.51 ratio         CAPILLARY BLOOD GLUCOSE      POCT Blood Glucose.: 89 mg/dL (24 Oct 2022 21:57)  POCT Blood Glucose.: 90 mg/dL (24 Oct 2022 16:48)    POCT Blood Glucose.: 115 mg/dL (10-22-22 @ 21:21)  POCT Blood Glucose.: 102 mg/dL (10-22-22 @ 16:44)  POCT Blood Glucose.: 108 mg/dL (10-21-22 @ 21:55)  POCT Blood Glucose.: 119 mg/dL (10-20-22 @ 21:09)  POCT Blood Glucose.: 109 mg/dL (10-20-22 @ 17:10)     Patient is a 87y old  Female who presents with a chief complaint of Metabolic Encephalopathy with decline in function (21 Oct 2022 11:53)      HPI:  Patient is a 86 y/o female with PMHx of MDS ( on Vidaza- gets every 28 days- just finished course- due in around 3 weeks) HTN, HLD, A-Fib ( On Coumadin 5mg daily), hypothyroid, neuropathy of B/L LE, Trigeminal Neuralgia ( Prior Gamma knife procedure for pain), B/L OA of the knees, prior CCY who presented to [facility] s/p fall at home. Patient was ambulating at home with the walker when she fell onto the right side. She doesn't have the best recollection of the event but denies LOC. She has a home health aide and her grandson was also home at the time. She was able to stand and walk but presented as notes ongoing dizziness and pain over her right chest. Pain over the right side is dull, worse with motion, but not sharp. She notes dizziness notable after fall but not prior. With the dizziness she gets occasional nausea. Prior to fall she denies any change in medications, new medications, recent illness, nor any other features out different from her baseline .  Patient noted on workup to have right sided fracture of the 7-10th right ribs without displacement. On Incentive she can do around 500 but without respiratory distress or hemodynamic instability. She was also noted on CT to have a small right sub arachnoid hemorrhage. Admitted to surgery and will have a SICU consult with possible SICU admission due to trauma burden. She was also noted to have a sodium of 120 and was on HCTZ. She has had a slow, improvement in her Na+ to 126 on fluid restriction and Na+ tabs. Neuro w/u revealed her SAH to be secondary to vascular calcification with a normal MRA and MRV and no further w/u recommended.     She is medically stable, but states that she has been confused and weak since about 1 week PTA. She requires CG/ min assist for transfers and CG for ambulation with a RW 40 feet only secondary to impaired endurance and mod assistance for LE dressing.   PTA, she live with her daughter and had a HHA 8 hrs. a day, 5 days a week. She reports being able to transfer and ambulate with a RW with supervision PTA and being able to dress herself. She was evaluated by me on the Trauma Service and was found to be a good acute inpatient rehab candidate.  (20 Oct 2022 13:56)    TODAY'S SUBJECTIVE & REVIEW OF SYMPTOMS:    Seen this morning. No new complaints. Denies pain. Participating in rehab. Na improved to 137. INR is 2.03. will give 4 mg coumadin (home dose is 5mg) Patient states this morning she choked on medication pills because she was swallowing them too fast. also reports her left knee is painful from arthritis and it buckled during therapy when she was walking. also has not had a BM in days. she has constipation issues even at home, and requests medication to help move her bowels.     CLOF: bed mobility TA, transfers CS  , Ambulating 150' RW TA      Constiutional:    [ x  ] WNL        [   ] poor appetite   [   ] insomnia   [   ] tired      ENT:                                             [ x ]  , [place]      Cardio:                [ x  ] WNL           [   ] CP   [   ] BROWN   [   ] palpitations               Resp:                   [  x ] WNL           [   ] SOB   [   ] cough   [   ] wheezing   GI:                        [ x  ] WNL           [   ] constipation   [   ] diarrhea   [   ] abdominal pain   [   ] nausea   [   ] emesis                                :                      [x   ] WNL           [   ] KENNY  [   ] dysuria   [   ] difficulty voiding             Endo:                   [ x  ] WNL          [   ] polyuria   [   ] temperature intolerance                 Skin:                     [ x  ] WNL          [   ] pain   [   ] wound   [   ] rash   MSK:                    [   ] WNL          [   ] muscle pain   [ x  ] joint pain/ stiffness both knees   [   ] muscle tenderness   [   ] swelling   Neuro:                 [   ] WNL          [   ] HA   [   ] change in vision   [   ] tremor   [x   ] weakness   [   ]dysphagia              Cognitive:           [  x ] WNL           [   ]confusion      Psych:                  [   ] WNL           [   ] hallucinations   [   ]agitation   [   ] delusion   [   ]depression [ x] anxiety      PHYSICAL EXAM    Vital Signs Last 24 Hrs  T(C): 36.6 (26 Oct 2022 05:01), Max: 37 (25 Oct 2022 12:55)  T(F): 97.9 (26 Oct 2022 05:01), Max: 98.6 (25 Oct 2022 12:55)  HR: 101 (26 Oct 2022 05:01) (80 - 101)  BP: 161/88 (26 Oct 2022 05:01) (136/63 - 161/88)  BP(mean): --  RR: 18 (26 Oct 2022 05:01) (18 - 18)  SpO2: --      Constitutional - [ x  ] NAD, Comfortable        [   ] other:  Chest - [ x   ] CTA     [   ] other:  Cardiovascular - [  x ] RRR, no murmer     [   ] other:  Abdomen - [ x  ] Soft, NT/ND      [   ] other:        - [ x ]  No KENNY CATHETER   [   ] YES  if yes: [   ] NO MEATAL TEAR OR DISCHARGE [   ] other:  Extremities - [ x ]  No C/C/E, No calf tenderness       [ x  ] other: mild calf tenderness in BLE L worse than R, mild swelling, no erythema   ROM - [ x  ] WFL     [   ] other: chronic arthritic changes both knees. Atrophy of intrinsic muscles of hands                                     Neurologic Exam -                 Cognitive - [ x  ]Awake, Alert, AAO to self, place, date, year, situation         [    ] other:       Communication - [ x  ]Fluent, No dysarthria       [   ] other:      Motor - No focal deficits                    Right UE -  [ x  ] WNL      [    ] other:                    Left UE -     [ x  ] WNL      [    ] other:                    Right LE -   [  x ] WNL       [    ] other:                    Left LE -      [x   ]WNL        [    ] other:      Sensory - [  x ] Intact to LT, burning dysesthesias both feet    [    ] other:          Reflexes - [ x  ] wnl/ symmetric     [   ] other:     Psychiatric - [ x  ]Mood stable, Affect WNL     [   ]other:     Skin - [  x ] intact      [   ] other    MEDICATIONS  (STANDING):  acetaminophen     Tablet .. 650 milliGRAM(s) Oral every 6 hours  atorvastatin 10 milliGRAM(s) Oral at bedtime  DULoxetine 30 milliGRAM(s) Oral every 12 hours  enoxaparin Injectable 40 milliGRAM(s) SubCutaneous every 24 hours  glycerin Suppository - Adult 1 Suppository(s) Rectal once  influenza  Vaccine (HIGH DOSE) 0.7 milliLiter(s) IntraMuscular once  levothyroxine 50 MICROGram(s) Oral daily  lidocaine   4% Patch 1 Patch Transdermal every 24 hours  losartan 100 milliGRAM(s) Oral daily  magnesium oxide 400 milliGRAM(s) Oral three times a day with meals  metoprolol tartrate 25 milliGRAM(s) Oral every 12 hours  NIFEdipine XL 30 milliGRAM(s) Oral daily  oxybutynin 5 milliGRAM(s) Oral every 12 hours  pantoprazole    Tablet 40 milliGRAM(s) Oral before breakfast  polyethylene glycol 3350 17 Gram(s) Oral two times a day  senna 1 Tablet(s) Oral at bedtime  sodium chloride 2 Gram(s) Oral every 8 hours    MEDICATIONS  (PRN):      RECENT LABS/IMAGING                          10.7   1.62  )-----------( 150      ( 24 Oct 2022 07:18 )             31.9     10-24    137  |  103  |  15  ----------------------------<  101<H>  3.9   |  27  |  0.5<L>    Ca    8.7      24 Oct 2022 07:18  Mg     1.8     10-24    TPro  5.8<L>  /  Alb  3.6  /  TBili  0.5  /  DBili  x   /  AST  17  /  ALT  20  /  AlkPhos  112  10-24    PT/INR - ( 24 Oct 2022 07:18 )   PT: 17.40 sec;   INR: 1.51 ratio         CAPILLARY BLOOD GLUCOSE    POCT Blood Glucose.: 89 mg/dL (24 Oct 2022 21:57)  POCT Blood Glucose.: 90 mg/dL (24 Oct 2022 16:48)    POCT Blood Glucose.: 115 mg/dL (10-22-22 @ 21:21)  POCT Blood Glucose.: 102 mg/dL (10-22-22 @ 16:44)  POCT Blood Glucose.: 108 mg/dL (10-21-22 @ 21:55)  POCT Blood Glucose.: 119 mg/dL (10-20-22 @ 21:09)  POCT Blood Glucose.: 109 mg/dL (10-20-22 @ 17:10)

## 2022-10-27 LAB
INR BLD: 1.86 RATIO — HIGH (ref 0.65–1.3)
PROTHROM AB SERPL-ACNC: 21.6 SEC — HIGH (ref 9.95–12.87)

## 2022-10-27 RX ORDER — WARFARIN SODIUM 2.5 MG/1
5 TABLET ORAL ONCE
Refills: 0 | Status: COMPLETED | OUTPATIENT
Start: 2022-10-27 | End: 2022-10-27

## 2022-10-27 RX ORDER — LACTULOSE 10 G/15ML
20 SOLUTION ORAL
Refills: 0 | Status: DISCONTINUED | OUTPATIENT
Start: 2022-10-27 | End: 2022-10-28

## 2022-10-27 RX ORDER — MULTIVIT WITH MIN/MFOLATE/K2 340-15/3 G
0.5 POWDER (GRAM) ORAL ONCE
Refills: 0 | Status: DISCONTINUED | OUTPATIENT
Start: 2022-10-27 | End: 2022-10-27

## 2022-10-27 RX ADMIN — DULOXETINE HYDROCHLORIDE 30 MILLIGRAM(S): 30 CAPSULE, DELAYED RELEASE ORAL at 17:38

## 2022-10-27 RX ADMIN — Medication 650 MILLIGRAM(S): at 17:54

## 2022-10-27 RX ADMIN — Medication 5 MILLIGRAM(S): at 17:38

## 2022-10-27 RX ADMIN — Medication 650 MILLIGRAM(S): at 12:30

## 2022-10-27 RX ADMIN — Medication 25 MILLIGRAM(S): at 17:38

## 2022-10-27 RX ADMIN — SODIUM CHLORIDE 2 GRAM(S): 9 INJECTION INTRAMUSCULAR; INTRAVENOUS; SUBCUTANEOUS at 05:34

## 2022-10-27 RX ADMIN — MAGNESIUM OXIDE 400 MG ORAL TABLET 400 MILLIGRAM(S): 241.3 TABLET ORAL at 08:06

## 2022-10-27 RX ADMIN — ATORVASTATIN CALCIUM 10 MILLIGRAM(S): 80 TABLET, FILM COATED ORAL at 21:54

## 2022-10-27 RX ADMIN — Medication 650 MILLIGRAM(S): at 12:17

## 2022-10-27 RX ADMIN — Medication 650 MILLIGRAM(S): at 00:06

## 2022-10-27 RX ADMIN — ENOXAPARIN SODIUM 40 MILLIGRAM(S): 100 INJECTION SUBCUTANEOUS at 12:16

## 2022-10-27 RX ADMIN — WARFARIN SODIUM 5 MILLIGRAM(S): 2.5 TABLET ORAL at 21:53

## 2022-10-27 RX ADMIN — PANTOPRAZOLE SODIUM 40 MILLIGRAM(S): 20 TABLET, DELAYED RELEASE ORAL at 08:06

## 2022-10-27 RX ADMIN — LOSARTAN POTASSIUM 100 MILLIGRAM(S): 100 TABLET, FILM COATED ORAL at 05:33

## 2022-10-27 RX ADMIN — SODIUM CHLORIDE 2 GRAM(S): 9 INJECTION INTRAMUSCULAR; INTRAVENOUS; SUBCUTANEOUS at 13:23

## 2022-10-27 RX ADMIN — MAGNESIUM OXIDE 400 MG ORAL TABLET 400 MILLIGRAM(S): 241.3 TABLET ORAL at 17:38

## 2022-10-27 RX ADMIN — Medication 25 MILLIGRAM(S): at 05:45

## 2022-10-27 RX ADMIN — Medication 30 MILLIGRAM(S): at 05:33

## 2022-10-27 RX ADMIN — LIDOCAINE 1 PATCH: 4 CREAM TOPICAL at 21:55

## 2022-10-27 RX ADMIN — POLYETHYLENE GLYCOL 3350 17 GRAM(S): 17 POWDER, FOR SOLUTION ORAL at 17:53

## 2022-10-27 RX ADMIN — Medication 650 MILLIGRAM(S): at 17:38

## 2022-10-27 RX ADMIN — Medication 10 MILLIGRAM(S): at 12:17

## 2022-10-27 RX ADMIN — SODIUM CHLORIDE 2 GRAM(S): 9 INJECTION INTRAMUSCULAR; INTRAVENOUS; SUBCUTANEOUS at 21:54

## 2022-10-27 RX ADMIN — Medication 650 MILLIGRAM(S): at 23:58

## 2022-10-27 RX ADMIN — SENNA PLUS 1 TABLET(S): 8.6 TABLET ORAL at 21:54

## 2022-10-27 RX ADMIN — DULOXETINE HYDROCHLORIDE 30 MILLIGRAM(S): 30 CAPSULE, DELAYED RELEASE ORAL at 05:33

## 2022-10-27 RX ADMIN — POLYETHYLENE GLYCOL 3350 17 GRAM(S): 17 POWDER, FOR SOLUTION ORAL at 05:45

## 2022-10-27 RX ADMIN — Medication 50 MICROGRAM(S): at 05:33

## 2022-10-27 RX ADMIN — Medication 5 MILLIGRAM(S): at 05:33

## 2022-10-27 RX ADMIN — Medication 650 MILLIGRAM(S): at 05:34

## 2022-10-27 RX ADMIN — LACTULOSE 20 GRAM(S): 10 SOLUTION ORAL at 18:57

## 2022-10-27 RX ADMIN — MAGNESIUM OXIDE 400 MG ORAL TABLET 400 MILLIGRAM(S): 241.3 TABLET ORAL at 12:17

## 2022-10-27 NOTE — PROGRESS NOTE ADULT - ASSESSMENT
Cognitive Rehabilitation Initial Evaluation      Orientation: AOx4     Mood: Euthymic     Pain: Denied     Treatment focused: Ms. Murray was seen for cognitive remediation treatment aimed at addressing her word-finding difficulties through cognitive stimulation and compensatory strategies.     Language Level : Mild impairments     Phonemic Fluency: 20 words/60 seconds     Semantic Fluency:  Animals: 8 words/60 seconds / Boys Names: 7 words/60 seconds / [employer] Items: 12 words/60 seconds     Category Switchin words/60 seconds     Executive Function: WNL     Category Switching Accuracy: 9 switches/60 seconds     Session Summary: Mrs. Murray participated in the cognitive rehabilitation session enthusiastically. She tried different strategies out to help her with verbal fluency including attempting to visualize items from each category in her head and relying on memories of using/seeing items from each category. She considered ways to use such strategies in her daily life to assist with word-finding problems. Overall, there is mild improvements from her initial evaluation, we will continue to monitor symptoms and progress.     Goals: Further cognitive rehabilitation     Plan: 1-2 times per week, 30-60 min

## 2022-10-27 NOTE — PROGRESS NOTE ADULT - ASSESSMENT
ASSESSMENT/PLAN    #Rehab of decline in mobility and cognition, s/p fall c/w Metabolic Encephalopathy from Hyponatremia. She also has multiple rib fractures 7- 10 on the right with pain.   She notes confusion and weakness prior to her fall and neuro w/u was c/w vascular calcification (not SAH), and moderate generalized microvascular ischemia. She also has severe OA of both knees, further limiting her mobility and Peripheral Neuropathy of unclear etiology. She requires close medical supervision because of her hyponatremia currently being treated with fluid restriction and Sodium tablets. She requires and can tolerate 3 hrs a day of intensive interdisciplinary rehab including PT, OT and ST and Neuoropsych eval.     #Hyponatremia  - improving since admission, s/p IV saline, on fluid restriction and NaCl tabs 3 gms. tid.   - was on Trileptal, HCTZ on admission, stopped  - followed by Renal Medicine;  - monitor daily  - 127>125>134 > 137  - wean salt tabs    # BLE calf pain  -improved  - tender to touch, left worse than right  - on coumadin already for afib     #constipation   - chronic problem  - senna daily, miralax BID, dulcolax daily   - suppository PRN    #Hypomagnesemia  - Continue supplementation and monitor    #Hypokalemia  - 3.5 > 3.9   - improved    #Peripheral Neuropathy  - dysesthesias of feet and atrophy of intrinsic muscles of hands  - unknown etiology  - was on Trileptal at home and now on Cymbalta    #History of Chronic Atrial Fibrillation  - rate controlled. Currently RRR  - Coumadin held prior to admission to rehab secondary to suspicion of SAH, now ruled out  - Resume coumadin home dose 5 mg daily and adjust for INR 2-3  - adjust coumadin according to daily INR  - INR 1.86 10/27. will give 5mg (home dose) coumadin  - if maintained in therapeutic range, can discontinue lovenox tomorrow     #History of Myelodysplasia  - stable pancytopenia  - followed by Heme/Onc.   - On Vidaza every 28 days     #Osteoarthritis  - pain meds, modalities, ther-ex    #Chronic Microvascular Cerebrovascular Disease  - monitor    #HTN  - controlled on current meds    #HLD  - on statin    #[place]  - Pocket Talker  - consider outpatient Audiology eval    #Hypothyroidism  - on synthroid    #Pain control:   - rib fracture  - neuropathy pain  - Tylenol prn, Cymbalta    #GI/Bowel Mgmt: Contipation in hospital  - continue bowel meds and monitor    - Diet: Regular with salt tabs         Precautions / PROPHYLAXIS:      - Falls    - Ortho: Weight bearing status: WBAT      - DVT prophylaxis: coumadin (d/c Lovenox when INR therapeutic)     ASSESSMENT/PLAN    #Rehab of decline in mobility and cognition, s/p fall c/w Metabolic Encephalopathy from Hyponatremia. She also has multiple rib fractures 7- 10 on the right with pain.   She notes confusion and weakness prior to her fall and neuro w/u was c/w vascular calcification (not SAH), and moderate generalized microvascular ischemia. She also has severe OA of both knees, further limiting her mobility and Peripheral Neuropathy of unclear etiology. She requires close medical supervision because of her hyponatremia currently being treated with fluid restriction and Sodium tablets. She requires and can tolerate 3 hrs a day of intensive interdisciplinary rehab including PT, OT and ST and Neuoropsych eval.     #Hyponatremia  - improving since admission, s/p IV saline, on fluid restriction and NaCl tabs 3 gms. tid.   - was on Trileptal, HCTZ on admission, stopped  - followed by Renal Medicine;  - monitor daily  - 127>125>134 > 137  - wean salt tabs    # BLE calf pain  - tender to touch, left worse than right  - on coumadin already for afib    #constipation   - chronic problem  - senna daily, miralax BID, dulcolax daily   - suppository PRN    #Hypomagnesemia  - Continue supplementation and monitor    #Hypokalemia  - 3.5 > 3.9   - improved    #Peripheral Neuropathy  - dysesthesias of feet and atrophy of intrinsic muscles of hands  - unknown etiology  - was on Trileptal at home and now on Cymbalta    #History of Chronic Atrial Fibrillation  - rate controlled. Currently RRR  - Coumadin held prior to admission to rehab secondary to suspicion of SAH, now ruled out  - Resume coumadin home dose 5 mg daily and adjust for INR 2-3  - adjust coumadin according to daily INR  - INR 1.86 10/27. will give 5mg (home dose) coumadin  - if maintained in therapeutic range, can discontinue lovenox tomorrow     #History of Myelodysplasia  - stable pancytopenia  - followed by Heme/Onc.   - On Vidaza every 28 days     #Osteoarthritis  - pain meds, modalities, ther-ex    #Chronic Microvascular Cerebrovascular Disease  - monitor    #HTN  - controlled on current meds    #HLD  - on statin    #[place]  - Pocket Talker  - consider outpatient Audiology eval    #Hypothyroidism  - on synthroid    #Pain control:   - rib fracture  - neuropathy pain  - Tylenol prn, Cymbalta    #GI/Bowel Mgmt: Contipation in hospital  - continue bowel meds and monitor    - Diet: Regular with salt tabs         Precautions / PROPHYLAXIS:      - Falls    - Ortho: Weight bearing status: WBAT      - DVT prophylaxis: coumadin (d/c Lovenox when INR therapeutic)     ASSESSMENT/PLAN    #Rehab of decline in mobility and cognition, s/p fall c/w Metabolic Encephalopathy from Hyponatremia. She also has multiple rib fractures 7- 10 on the right with pain   She notes confusion and weakness prior to her fall and neuro w/u was c/w vascular calcification (not SAH), and moderate generalized microvascular ischemia. She also has severe OA of both knees, further limiting her mobility and Peripheral Neuropathy of unclear etiology. She requires close medical supervision because of her hyponatremia currently being treated with fluid restriction and Sodium tablets. She requires and can tolerate 3 hrs a day of intensive interdisciplinary rehab including PT, OT and ST and Neuoropsych eval.   Improving    #Hyponatremia  - improving since admission, s/p IV saline, on fluid restriction and NaCl tabs 3 gms. tid.   - was on Trileptal, HCTZ on admission, stopped  - followed by Renal Medicine;  - monitor daily  - 127>125>134 > 137  - wean salt tabs    # BLE calf pain  - tender to touch, left worse than right  - on coumadin already for afib    #constipation   - chronic problem  - senna daily, miralax BID, dulcolax daily   - suppository PRN  - give 4 oz MagCitrate 10/27    #Hypomagnesemia  - Continue supplementation and monitor    #Hypokalemia  - 3.5 > 3.9   - improved    #Peripheral Neuropathy  - dysesthesias of feet and atrophy of intrinsic muscles of hands  - unknown etiology  - was on Trileptal at home and now on Cymbalta    #History of Chronic Atrial Fibrillation  - rate controlled. Currently RRR  - Coumadin held prior to admission to rehab secondary to suspicion of SAH, now ruled out  - Resume coumadin home dose 5 mg daily and adjust for INR 2-3  - adjust coumadin according to daily INR  - INR 1.86 10/27. will give 5mg (home dose) coumadin  - if maintained in therapeutic range, can discontinue lovenox tomorrow     #History of Myelodysplasia  - stable pancytopenia  - followed by Heme/Onc.   - On Vidaza every 28 days     #Osteoarthritis  - pain meds, modalities, ther-ex    #Chronic Microvascular Cerebrovascular Disease  - monitor    #HTN  - controlled on current meds    #HLD  - on statin    #[place]  - Pocket Talker  - consider outpatient Audiology eval    #Hypothyroidism  - on synthroid    #Pain control:   - rib fracture  - neuropathy pain  - Tylenol prn, Cymbalta    #GI/Bowel Mgmt: Contipation in hospital  - continue bowel meds and monitor    - Diet: Regular with salt tabs         Precautions / PROPHYLAXIS:      - Falls    - Ortho: Weight bearing status: WBAT      - DVT prophylaxis: coumadin (d/c Lovenox when INR therapeutic)

## 2022-10-27 NOTE — PROGRESS NOTE ADULT - SUBJECTIVE AND OBJECTIVE BOX
Patient is a 87y old  Female who presents with a chief complaint of Metabolic Encephalopathy with decline in function (21 Oct 2022 11:53)      HPI:  Patient is a 88 y/o female with PMHx of MDS ( on Vidaza- gets every 28 days- just finished course- due in around 3 weeks) HTN, HLD, A-Fib ( On Coumadin 5mg daily), hypothyroid, neuropathy of B/L LE, Trigeminal Neuralgia ( Prior Gamma knife procedure for pain), B/L OA of the knees, prior CCY who presented to [facility] s/p fall at home. Patient was ambulating at home with the walker when she fell onto the right side. She doesn't have the best recollection of the event but denies LOC. She has a home health aide and her grandson was also home at the time. She was able to stand and walk but presented as notes ongoing dizziness and pain over her right chest. Pain over the right side is dull, worse with motion, but not sharp. She notes dizziness notable after fall but not prior. With the dizziness she gets occasional nausea. Prior to fall she denies any change in medications, new medications, recent illness, nor any other features out different from her baseline .  Patient noted on workup to have right sided fracture of the 7-10th right ribs without displacement. On Incentive she can do around 500 but without respiratory distress or hemodynamic instability. She was also noted on CT to have a small right sub arachnoid hemorrhage. Admitted to surgery and will have a SICU consult with possible SICU admission due to trauma burden. She was also noted to have a sodium of 120 and was on HCTZ. She has had a slow, improvement in her Na+ to 126 on fluid restriction and Na+ tabs. Neuro w/u revealed her SAH to be secondary to vascular calcification with a normal MRA and MRV and no further w/u recommended.     She is medically stable, but states that she has been confused and weak since about 1 week PTA. She requires CG/ min assist for transfers and CG for ambulation with a RW 40 feet only secondary to impaired endurance and mod assistance for LE dressing.   PTA, she live with her daughter and had a HHA 8 hrs. a day, 5 days a week. She reports being able to transfer and ambulate with a RW with supervision PTA and being able to dress herself. She was evaluated by me on the Trauma Service and was found to be a good acute inpatient rehab candidate.  (20 Oct 2022 13:56)    TODAY'S SUBJECTIVE & REVIEW OF SYMPTOMS:    Seen this morning. No new complaints. Denies pain. Participating in rehab. Na improved to 137.INR back down to 1.86, will give home dose of5mg. Patient otherwise has no acute complaints.   Constipation improved.     CLOF: bed mobility TA, transfers CS  , Ambulating 150' RW TA      Constiutional:    [ x  ] WNL        [   ] poor appetite   [   ] insomnia   [   ] tired      ENT:                                             [ x ]  , [place]      Cardio:                [ x  ] WNL           [   ] CP   [   ] BROWN   [   ] palpitations               Resp:                   [  x ] WNL           [   ] SOB   [   ] cough   [   ] wheezing   GI:                        [ x  ] WNL           [   ] constipation   [   ] diarrhea   [   ] abdominal pain   [   ] nausea   [   ] emesis                                :                      [x   ] WNL           [   ] KENNY  [   ] dysuria   [   ] difficulty voiding             Endo:                   [ x  ] WNL          [   ] polyuria   [   ] temperature intolerance                 Skin:                     [ x  ] WNL          [   ] pain   [   ] wound   [   ] rash   MSK:                    [   ] WNL          [   ] muscle pain   [ x  ] joint pain/ stiffness both knees   [   ] muscle tenderness   [   ] swelling   Neuro:                 [   ] WNL          [   ] HA   [   ] change in vision   [   ] tremor   [x   ] weakness   [   ]dysphagia              Cognitive:           [  x ] WNL           [   ]confusion      Psych:                  [   ] WNL           [   ] hallucinations   [   ]agitation   [   ] delusion   [   ]depression [ x] anxiety      PHYSICAL EXAMVital Signs Last 24 Hrs  Vital Signs Last 24 Hrs  T(C): 35.7 (27 Oct 2022 05:00), Max: 36.4 (26 Oct 2022 20:13)  T(F): 96.3 (27 Oct 2022 05:00), Max: 97.6 (26 Oct 2022 20:13)  HR: 92 (27 Oct 2022 05:00) (86 - 92)  BP: 122/72 (27 Oct 2022 05:00) (120/61 - 130/64)  BP(mean): 92 (27 Oct 2022 05:00) (92 - 92)  RR: 19 (26 Oct 2022 20:13) (19 - 20)  SpO2: --      Constitutional - [ x  ] NAD, Comfortable        [   ] other:  Chest - [ x   ] CTA     [   ] other:  Cardiovascular - [  x ] RRR, no murmer     [   ] other:  Abdomen - [ x  ] Soft, NT/ND      [   ] other:        - [ x ]  No KENNY CATHETER   [   ] YES  if yes: [   ] NO MEATAL TEAR OR DISCHARGE [   ] other:  Extremities - [ x ]  No C/C/E, No calf tenderness       [ x  ] other: mild calf tenderness in BLE L worse than R, mild swelling, no erythema   ROM - [ x  ] WFL     [   ] other: chronic arthritic changes both knees. Atrophy of intrinsic muscles of hands                                     Neurologic Exam -                 Cognitive - [ x  ]Awake, Alert, AAO to self, place, date, year, situation         [    ] other:       Communication - [ x  ]Fluent, No dysarthria       [   ] other:      Motor - No focal deficits                    Right UE -  [ x  ] WNL      [    ] other:                    Left UE -     [ x  ] WNL      [    ] other:                    Right LE -   [  x ] WNL       [    ] other:                    Left LE -      [x   ]WNL        [    ] other:      Sensory - [  x ] Intact to LT, burning dysesthesias both feet    [    ] other:          Reflexes - [ x  ] wnl/ symmetric     [   ] other:     Psychiatric - [ x  ]Mood stable, Affect WNL     [   ]other:     Skin - [  x ] intact      [   ] other    MEDICATIONS  (STANDING):  acetaminophen     Tablet .. 650 milliGRAM(s) Oral every 6 hours  atorvastatin 10 milliGRAM(s) Oral at bedtime  DULoxetine 30 milliGRAM(s) Oral every 12 hours  enoxaparin Injectable 40 milliGRAM(s) SubCutaneous every 24 hours  glycerin Suppository - Adult 1 Suppository(s) Rectal once  influenza  Vaccine (HIGH DOSE) 0.7 milliLiter(s) IntraMuscular once  levothyroxine 50 MICROGram(s) Oral daily  lidocaine   4% Patch 1 Patch Transdermal every 24 hours  losartan 100 milliGRAM(s) Oral daily  magnesium oxide 400 milliGRAM(s) Oral three times a day with meals  metoprolol tartrate 25 milliGRAM(s) Oral every 12 hours  NIFEdipine XL 30 milliGRAM(s) Oral daily  oxybutynin 5 milliGRAM(s) Oral every 12 hours  pantoprazole    Tablet 40 milliGRAM(s) Oral before breakfast  polyethylene glycol 3350 17 Gram(s) Oral two times a day  senna 1 Tablet(s) Oral at bedtime  sodium chloride 2 Gram(s) Oral every 8 hours    MEDICATIONS  (PRN):      RECENT LABS/IMAGING            PT/INR - ( 27 Oct 2022 08:47 )   PT: 21.60 sec;   INR: 1.86 ratio             POCT Blood Glucose.: 89 mg/dL (10-24-22 @ 21:57)  POCT Blood Glucose.: 90 mg/dL (10-24-22 @ 16:48)                        10.7   1.62  )-----------( 150      ( 24 Oct 2022 07:18 )             31.9     10-24    137  |  103  |  15  ----------------------------<  101<H>  3.9   |  27  |  0.5<L>    Ca    8.7      24 Oct 2022 07:18  Mg     1.8     10-24    TPro  5.8<L>  /  Alb  3.6  /  TBili  0.5  /  DBili  x   /  AST  17  /  ALT  20  /  AlkPhos  112  10-24    PT/INR - ( 24 Oct 2022 07:18 )   PT: 17.40 sec;   INR: 1.51 ratio         CAPILLARY BLOOD GLUCOSE    POCT Blood Glucose.: 89 mg/dL (24 Oct 2022 21:57)  POCT Blood Glucose.: 90 mg/dL (24 Oct 2022 16:48)    POCT Blood Glucose.: 115 mg/dL (10-22-22 @ 21:21)  POCT Blood Glucose.: 102 mg/dL (10-22-22 @ 16:44)  POCT Blood Glucose.: 108 mg/dL (10-21-22 @ 21:55)  POCT Blood Glucose.: 119 mg/dL (10-20-22 @ 21:09)  POCT Blood Glucose.: 109 mg/dL (10-20-22 @ 17:10)     Patient is a 87y old  Female who presents with a chief complaint of Metabolic Encephalopathy with decline in function (21 Oct 2022 11:53)      HPI:  Patient is a 88 y/o female with PMHx of MDS ( on Vidaza- gets every 28 days- just finished course- due in around 3 weeks) HTN, HLD, A-Fib ( On Coumadin 5mg daily), hypothyroid, neuropathy of B/L LE, Trigeminal Neuralgia ( Prior Gamma knife procedure for pain), B/L OA of the knees, prior CCY who presented to [facility] s/p fall at home. Patient was ambulating at home with the walker when she fell onto the right side. She doesn't have the best recollection of the event but denies LOC. She has a home health aide and her grandson was also home at the time. She was able to stand and walk but presented as notes ongoing dizziness and pain over her right chest. Pain over the right side is dull, worse with motion, but not sharp. She notes dizziness notable after fall but not prior. With the dizziness she gets occasional nausea. Prior to fall she denies any change in medications, new medications, recent illness, nor any other features out different from her baseline .  Patient noted on workup to have right sided fracture of the 7-10th right ribs without displacement. On Incentive she can do around 500 but without respiratory distress or hemodynamic instability. She was also noted on CT to have a small right sub arachnoid hemorrhage. Admitted to surgery and will have a SICU consult with possible SICU admission due to trauma burden. She was also noted to have a sodium of 120 and was on HCTZ. She has had a slow, improvement in her Na+ to 126 on fluid restriction and Na+ tabs. Neuro w/u revealed her SAH to be secondary to vascular calcification with a normal MRA and MRV and no further w/u recommended.     She is medically stable, but states that she has been confused and weak since about 1 week PTA. She requires CG/ min assist for transfers and CG for ambulation with a RW 40 feet only secondary to impaired endurance and mod assistance for LE dressing.   PTA, she live with her daughter and had a HHA 8 hrs. a day, 5 days a week. She reports being able to transfer and ambulate with a RW with supervision PTA and being able to dress herself. She was evaluated by me on the Trauma Service and was found to be a good acute inpatient rehab candidate.  (20 Oct 2022 13:56)    TODAY'S SUBJECTIVE & REVIEW OF SYMPTOMS:    Seen this morning. No new complaints. Denies pain. Participating in rehab. Na improved to 137.INR back down to 1.86, will give home dose of 5mg. Patient otherwise has no acute complaints.   Constipation persists x 4-5 days     CLOF: bed mobility TA, transfers CS  , Ambulating 150' RW TA      Constiutional:    [ x  ] WNL        [   ] poor appetite   [   ] insomnia   [   ] tired      ENT:                                             [ x ]  , [place]      Cardio:                [ x  ] WNL           [   ] CP   [   ] BROWN   [   ] palpitations               Resp:                   [  x ] WNL           [   ] SOB   [   ] cough   [   ] wheezing   GI:                        [   ] WNL           [ x  ] constipation   [   ] diarrhea   [   ] abdominal pain   [   ] nausea   [   ] emesis                                :                      [x   ] WNL           [   ] KENNY  [   ] dysuria   [   ] difficulty voiding             Endo:                   [ x  ] WNL          [   ] polyuria   [   ] temperature intolerance                 Skin:                     [ x  ] WNL          [   ] pain   [   ] wound   [   ] rash   MSK:                    [   ] WNL          [   ] muscle pain   [ x  ] joint pain/ stiffness both knees   [   ] muscle tenderness   [   ] swelling   Neuro:                 [   ] WNL          [   ] HA   [   ] change in vision   [   ] tremor   [x   ] weakness   [   ]dysphagia              Cognitive:           [  x ] WNL           [   ]confusion      Psych:                  [   ] WNL           [   ] hallucinations   [   ]agitation   [   ] delusion   [   ]depression [ x] anxiety      PHYSICAL EXAMVital Signs Last 24 Hrs  Vital Signs Last 24 Hrs  T(C): 35.7 (27 Oct 2022 05:00), Max: 36.4 (26 Oct 2022 20:13)  T(F): 96.3 (27 Oct 2022 05:00), Max: 97.6 (26 Oct 2022 20:13)  HR: 92 (27 Oct 2022 05:00) (86 - 92)  BP: 122/72 (27 Oct 2022 05:00) (120/61 - 130/64)  BP(mean): 92 (27 Oct 2022 05:00) (92 - 92)  RR: 19 (26 Oct 2022 20:13) (19 - 20)  SpO2: --      Constitutional - [ x  ] NAD, Comfortable        [   ] other:  Chest - [ x   ] CTA     [   ] other:  Cardiovascular - [  x ] RRR, no murmer     [   ] other:  Abdomen - [ x  ] Soft, NT/ND      [   ] other:        - [ x ]  No KENNY CATHETER   [   ] YES  if yes: [   ] NO MEATAL TEAR OR DISCHARGE [   ] other:  Extremities - [ x ]  No C/C/E, No calf tenderness       [ x  ] other: mild calf tenderness in BLE L worse than R, mild swelling, no erythema   ROM - [ x  ] WFL     [   ] other: chronic arthritic changes both knees. Atrophy of intrinsic muscles of hands                                     Neurologic Exam -                 Cognitive - [ x  ]Awake, Alert, AAO to self, place, date, year, situation         [    ] other:       Communication - [ x  ]Fluent, No dysarthria       [   ] other:      Motor - No focal deficits                    Right UE -  [ x  ] WNL      [    ] other:                    Left UE -     [ x  ] WNL      [    ] other:                    Right LE -   [  x ] WNL       [    ] other:                    Left LE -      [x   ]WNL        [    ] other:      Sensory - [  x ] Intact to LT, burning dysesthesias both feet    [    ] other:          Reflexes - [ x  ] wnl/ symmetric     [   ] other:     Psychiatric - [ x  ]Mood stable, Affect WNL     [   ]other:     Skin - [  x ] intact      [   ] other    MEDICATIONS  (STANDING):  acetaminophen     Tablet .. 650 milliGRAM(s) Oral every 6 hours  atorvastatin 10 milliGRAM(s) Oral at bedtime  DULoxetine 30 milliGRAM(s) Oral every 12 hours  enoxaparin Injectable 40 milliGRAM(s) SubCutaneous every 24 hours  glycerin Suppository - Adult 1 Suppository(s) Rectal once  influenza  Vaccine (HIGH DOSE) 0.7 milliLiter(s) IntraMuscular once  levothyroxine 50 MICROGram(s) Oral daily  lidocaine   4% Patch 1 Patch Transdermal every 24 hours  losartan 100 milliGRAM(s) Oral daily  magnesium oxide 400 milliGRAM(s) Oral three times a day with meals  metoprolol tartrate 25 milliGRAM(s) Oral every 12 hours  NIFEdipine XL 30 milliGRAM(s) Oral daily  oxybutynin 5 milliGRAM(s) Oral every 12 hours  pantoprazole    Tablet 40 milliGRAM(s) Oral before breakfast  polyethylene glycol 3350 17 Gram(s) Oral two times a day  senna 1 Tablet(s) Oral at bedtime  sodium chloride 2 Gram(s) Oral every 8 hours    MEDICATIONS  (PRN):      RECENT LABS/IMAGING            PT/INR - ( 27 Oct 2022 08:47 )   PT: 21.60 sec;   INR: 1.86 ratio             POCT Blood Glucose.: 89 mg/dL (10-24-22 @ 21:57)  POCT Blood Glucose.: 90 mg/dL (10-24-22 @ 16:48)                        10.7   1.62  )-----------( 150      ( 24 Oct 2022 07:18 )             31.9     10-24    137  |  103  |  15  ----------------------------<  101<H>  3.9   |  27  |  0.5<L>    Ca    8.7      24 Oct 2022 07:18  Mg     1.8     10-24    TPro  5.8<L>  /  Alb  3.6  /  TBili  0.5  /  DBili  x   /  AST  17  /  ALT  20  /  AlkPhos  112  10-24    PT/INR - ( 24 Oct 2022 07:18 )   PT: 17.40 sec;   INR: 1.51 ratio         CAPILLARY BLOOD GLUCOSE    POCT Blood Glucose.: 89 mg/dL (24 Oct 2022 21:57)  POCT Blood Glucose.: 90 mg/dL (24 Oct 2022 16:48)    POCT Blood Glucose.: 115 mg/dL (10-22-22 @ 21:21)  POCT Blood Glucose.: 102 mg/dL (10-22-22 @ 16:44)  POCT Blood Glucose.: 108 mg/dL (10-21-22 @ 21:55)  POCT Blood Glucose.: 119 mg/dL (10-20-22 @ 21:09)  POCT Blood Glucose.: 109 mg/dL (10-20-22 @ 17:10)

## 2022-10-28 LAB
ANION GAP SERPL CALC-SCNC: 7 MMOL/L — SIGNIFICANT CHANGE UP (ref 7–14)
BASOPHILS # BLD AUTO: 0.04 K/UL — SIGNIFICANT CHANGE UP (ref 0–0.2)
BASOPHILS NFR BLD AUTO: 2.4 % — HIGH (ref 0–1)
BUN SERPL-MCNC: 12 MG/DL — SIGNIFICANT CHANGE UP (ref 10–20)
CALCIUM SERPL-MCNC: 9 MG/DL — SIGNIFICANT CHANGE UP (ref 8.4–10.4)
CHLORIDE SERPL-SCNC: 98 MMOL/L — SIGNIFICANT CHANGE UP (ref 98–110)
CO2 SERPL-SCNC: 28 MMOL/L — SIGNIFICANT CHANGE UP (ref 17–32)
CREAT SERPL-MCNC: 0.5 MG/DL — LOW (ref 0.7–1.5)
EGFR: 92 ML/MIN/1.73M2 — SIGNIFICANT CHANGE UP
EOSINOPHIL # BLD AUTO: 0.06 K/UL — SIGNIFICANT CHANGE UP (ref 0–0.7)
EOSINOPHIL NFR BLD AUTO: 3.6 % — SIGNIFICANT CHANGE UP (ref 0–8)
GLUCOSE SERPL-MCNC: 106 MG/DL — HIGH (ref 70–99)
HCT VFR BLD CALC: 33.1 % — LOW (ref 37–47)
HGB BLD-MCNC: 11.3 G/DL — LOW (ref 12–16)
IMM GRANULOCYTES NFR BLD AUTO: 0 % — LOW (ref 0.1–0.3)
INR BLD: 1.84 RATIO — HIGH (ref 0.65–1.3)
LYMPHOCYTES # BLD AUTO: 0.81 K/UL — LOW (ref 1.2–3.4)
LYMPHOCYTES # BLD AUTO: 48.8 % — SIGNIFICANT CHANGE UP (ref 20.5–51.1)
MCHC RBC-ENTMCNC: 34.1 G/DL — SIGNIFICANT CHANGE UP (ref 32–37)
MCHC RBC-ENTMCNC: 34.1 PG — HIGH (ref 27–31)
MCV RBC AUTO: 100 FL — HIGH (ref 81–99)
MONOCYTES # BLD AUTO: 0.06 K/UL — LOW (ref 0.1–0.6)
MONOCYTES NFR BLD AUTO: 3.6 % — SIGNIFICANT CHANGE UP (ref 1.7–9.3)
NEUTROPHILS # BLD AUTO: 0.69 K/UL — LOW (ref 1.4–6.5)
NEUTROPHILS NFR BLD AUTO: 41.6 % — LOW (ref 42.2–75.2)
NRBC # BLD: 0 /100 WBCS — SIGNIFICANT CHANGE UP (ref 0–0)
PLATELET # BLD AUTO: 238 K/UL — SIGNIFICANT CHANGE UP (ref 130–400)
POTASSIUM SERPL-MCNC: 4.3 MMOL/L — SIGNIFICANT CHANGE UP (ref 3.5–5)
POTASSIUM SERPL-SCNC: 4.3 MMOL/L — SIGNIFICANT CHANGE UP (ref 3.5–5)
PROTHROM AB SERPL-ACNC: 21.4 SEC — HIGH (ref 9.95–12.87)
RBC # BLD: 3.31 M/UL — LOW (ref 4.2–5.4)
RBC # FLD: 15.2 % — HIGH (ref 11.5–14.5)
SODIUM SERPL-SCNC: 133 MMOL/L — LOW (ref 135–146)
WBC # BLD: 1.66 K/UL — LOW (ref 4.8–10.8)
WBC # FLD AUTO: 1.66 K/UL — LOW (ref 4.8–10.8)

## 2022-10-28 RX ORDER — LACTULOSE 10 G/15ML
10 SOLUTION ORAL DAILY
Refills: 0 | Status: DISCONTINUED | OUTPATIENT
Start: 2022-10-28 | End: 2022-11-02

## 2022-10-28 RX ORDER — WARFARIN SODIUM 2.5 MG/1
6 TABLET ORAL ONCE
Refills: 0 | Status: COMPLETED | OUTPATIENT
Start: 2022-10-28 | End: 2022-10-28

## 2022-10-28 RX ORDER — WARFARIN SODIUM 2.5 MG/1
5 TABLET ORAL ONCE
Refills: 0 | Status: DISCONTINUED | OUTPATIENT
Start: 2022-10-28 | End: 2022-10-28

## 2022-10-28 RX ADMIN — Medication 5 MILLIGRAM(S): at 06:52

## 2022-10-28 RX ADMIN — ATORVASTATIN CALCIUM 10 MILLIGRAM(S): 80 TABLET, FILM COATED ORAL at 22:10

## 2022-10-28 RX ADMIN — LIDOCAINE 1 PATCH: 4 CREAM TOPICAL at 07:11

## 2022-10-28 RX ADMIN — Medication 25 MILLIGRAM(S): at 06:52

## 2022-10-28 RX ADMIN — MAGNESIUM OXIDE 400 MG ORAL TABLET 400 MILLIGRAM(S): 241.3 TABLET ORAL at 08:27

## 2022-10-28 RX ADMIN — Medication 650 MILLIGRAM(S): at 00:08

## 2022-10-28 RX ADMIN — Medication 50 MICROGRAM(S): at 06:39

## 2022-10-28 RX ADMIN — POLYETHYLENE GLYCOL 3350 17 GRAM(S): 17 POWDER, FOR SOLUTION ORAL at 06:52

## 2022-10-28 RX ADMIN — DULOXETINE HYDROCHLORIDE 30 MILLIGRAM(S): 30 CAPSULE, DELAYED RELEASE ORAL at 06:39

## 2022-10-28 RX ADMIN — MAGNESIUM OXIDE 400 MG ORAL TABLET 400 MILLIGRAM(S): 241.3 TABLET ORAL at 12:57

## 2022-10-28 RX ADMIN — LIDOCAINE 1 PATCH: 4 CREAM TOPICAL at 22:07

## 2022-10-28 RX ADMIN — MAGNESIUM OXIDE 400 MG ORAL TABLET 400 MILLIGRAM(S): 241.3 TABLET ORAL at 17:04

## 2022-10-28 RX ADMIN — LACTULOSE 20 GRAM(S): 10 SOLUTION ORAL at 06:51

## 2022-10-28 RX ADMIN — Medication 650 MILLIGRAM(S): at 12:56

## 2022-10-28 RX ADMIN — LOSARTAN POTASSIUM 100 MILLIGRAM(S): 100 TABLET, FILM COATED ORAL at 06:51

## 2022-10-28 RX ADMIN — Medication 650 MILLIGRAM(S): at 07:10

## 2022-10-28 RX ADMIN — WARFARIN SODIUM 6 MILLIGRAM(S): 2.5 TABLET ORAL at 23:21

## 2022-10-28 RX ADMIN — PANTOPRAZOLE SODIUM 40 MILLIGRAM(S): 20 TABLET, DELAYED RELEASE ORAL at 06:52

## 2022-10-28 RX ADMIN — SENNA PLUS 1 TABLET(S): 8.6 TABLET ORAL at 22:10

## 2022-10-28 RX ADMIN — DULOXETINE HYDROCHLORIDE 30 MILLIGRAM(S): 30 CAPSULE, DELAYED RELEASE ORAL at 17:04

## 2022-10-28 RX ADMIN — SODIUM CHLORIDE 2 GRAM(S): 9 INJECTION INTRAMUSCULAR; INTRAVENOUS; SUBCUTANEOUS at 13:05

## 2022-10-28 RX ADMIN — SODIUM CHLORIDE 2 GRAM(S): 9 INJECTION INTRAMUSCULAR; INTRAVENOUS; SUBCUTANEOUS at 22:08

## 2022-10-28 RX ADMIN — Medication 5 MILLIGRAM(S): at 17:03

## 2022-10-28 RX ADMIN — SODIUM CHLORIDE 2 GRAM(S): 9 INJECTION INTRAMUSCULAR; INTRAVENOUS; SUBCUTANEOUS at 06:52

## 2022-10-28 RX ADMIN — Medication 25 MILLIGRAM(S): at 17:04

## 2022-10-28 RX ADMIN — Medication 650 MILLIGRAM(S): at 23:58

## 2022-10-28 RX ADMIN — Medication 30 MILLIGRAM(S): at 06:52

## 2022-10-28 RX ADMIN — Medication 650 MILLIGRAM(S): at 06:38

## 2022-10-28 RX ADMIN — Medication 650 MILLIGRAM(S): at 17:03

## 2022-10-28 RX ADMIN — ENOXAPARIN SODIUM 40 MILLIGRAM(S): 100 INJECTION SUBCUTANEOUS at 12:57

## 2022-10-28 NOTE — CDI QUERY NOTE - NSCDIOTHERTXTBX2_GEN_ALL_CORE_FT
DOCUMENTATION CLARIFICATION FORM   Encounter #: [id number]                             Patient’s Name: Leta Dougherty  Medical Record #: [id number]                   Admit Date: 10-  : 1935                                       Discharged: 10-  CDI Specialist/: Olga Lidia                   Contact #: [phone]    Dear Dr. Baeza,                       Date: 10-                  The Physician’s or Provider’s documentation of the patient’s presentation, evaluation and  medical management, as identified below, may support a diagnosis that is not documented in the medical record.  In order to accurately capture all diagnoses to the greatest degree of specificity reflecting the patient’s actual severity of illness, the documentation in this patient’s medical record requires additional clarification.  Please include more specific documentation, either known or suspected, of a corresponding diagnosis associated with the clinical information described below in your Progress Note and/or Discharge Summary.    CLINICAL INDICATORS   Documentation  •	10-15 ED:... 87y Female... As per pt her RT knee buckled and fell on to her RT side yday, denies head   trauma, LOC.  •	10-15 H&P:... PMHx of MDS ( on Vidaza- gets every 28 days- just finished course- due in around 3 weeks).... S/P Fall unwitnessed on Coumadin... SICU due to age/SAH on anticoagulation/ and need for aggressive pulm toilet, monitoring with 4 rib fx....Right rib fracture 7-10- Incentive spirometer given- 500 initially, encouraged hourly use  •	10-17 Orthopedics Consult:... right superior pubic rami fracture....Recommend PT, WBAT RLE. No acute orthopedic intervention required.  Imaging  •	10-16 XR Chest Radiology Findings: Skeleton/soft tissues: Osteopenia. Fracture of the right seventh through   10th ribs...  •	10-16 XR Pelvis Radiology Findings:... No evidence of acute fracture or dislocation. Osteopenia. Degenerative change of bilateral hips and lower lumbar spine. Likely chronic L5 vertebral body compression fracture, post kyphoplasty.    Treatment  •	10-16 Incentive Spirometry  •	10-16 Pain management    QUERY  Based on your professional judgment and the clinical indicators, please clarify if the imaging report of osteopenia can be further specified as:  •	Pathological  fractures of right 7-10th rib associated with osteopenia  •	Fragility fractures of right 7-10th rib associated with osteopenia  •	Traumatic fractures of  right 7-10th rib associated with osteopenia  •	Other (please specify):   •	Clinically insignificant finding of osteopenia    Documentation clarification is required for compliance, accuracy in coding and billing, and   Reporting severity of illness, quality data  and risk of mortality.  --------------------------------------------------------------------------------------------------------------------------------------------  DO NOT REMOVE THIS RECORD WITHOUT FIRST NOTIFYING THE CDI SPECIALIST  This form is NOT a part of the permanent Medical Record.

## 2022-10-28 NOTE — CDI QUERY NOTE - NSCDIOTHERTXTBX_GEN_ALL_CORE_HH
DOCUMENTATION CLARIFICATION FORM   Encounter #: [id number]                             Patient’s Name: Leta Dougherty  Medical Record #: [id number]                   Admit Date: 10-  : 1935                                        Discharged: 10-  CDI Specialist/: Olga Lidia                   Contact #: [phone]  Dear Dr. Baeza,                                  Date: 10-                  The Physician’s or Provider’s documentation of the patient’s presentation, evaluation and  medical management, as identified below, may support a diagnosis that is not documented in the medical record.  In order to accurately capture all diagnoses to the greatest degree of specificity reflecting the patient’s actual severity of illness, the documentation in this patient’s medical record requires additional clarification.  Please include more specific documentation, either known or suspected, of a corresponding diagnosis associated with the clinical information described below in your Progress Note and/or Discharge Summary.    CLINICAL INDICATORS  Documentation  •	10-15 ED:... 88 yo h ho afib on warfarin...MDS.... felt R knee buckle (similar to past), then fell. thinks landed on buttocks. Family/ aide heard noise, came to pt, found her seated on floor, unresponsive "like she was asleep" for 10s, then aroused. did not come to ED yesterday, but awoke w/ dizziness so came to day.  •	10-15 H&P:... denies LOC.... CT imaging and injuries are: R rib fx 7-10 and small SAH  •	10-16 Neurosurgery: s/p fall CTH shows small questionable SAH vs venous plexus to quadreminal cistern....Exam intact without neurodeficits.   •	10-17 Neurology Consult:... reports she developed sudden weakness of the right leg which caused her to fall, but not losing consciousness and not hitting her head. She remembers and explaining to us all the event (before, during and after the fall). She denies any meningeal sign and her neck was supple to my exam. i reviewed the images and examined the patient....she needs to be evaluated by general neurology or stroke team for sudden right leg weakness. on our exam her nihss was 1 for questionable lt facial weakness.   •	10-19 Neurology:... evaluation of acute SAH and for any possible vascular malformations to explain the nature of this bleed. MRI w/ wout contrast, MRA, and MRV w/ wout contrast were requested. It is reported that the previously observed SAH likely represents vessel calcification, and no other acute intracranial pathology was identified.  •	10-20 DC Summary:...Work up was significant for a small acute SAH within right quadrigeminal plate cistern and right minimally displaced rib 7-10 fractures. There was also concern for a venous plexus. Repeat imaging was stable. MRIs were performed to evaluate for the venous plexus and came back negative except for chronic microvascular changes.  •	    Based on your professional judgment and the clinical indicators, please clarify if the findings can be further specified as:  •	After review, I concur with Neurology Consult’s finding that the SAH was ruled out because it likely represents a vessel calcification  •	After review, a small acute nontraumatic SAH could not be ruled out at the time of discharge  •	After review, a small acute traumatic SAH could not be ruled out at the time of discharge  •	Other (please specify):  •	Clinically unable to determine    Documentation clarification is required for compliance, accuracy in coding and billing, and reporting severity of illness, quality data   and risk of mortality.  --------------------------------------------------------------------------------------------------------------------------------------------  DO NOT REMOVE THIS RECORD WITHOUT FIRST NOTIFYING THE CDI SPECIALIST  This form is NOT a part of the permanent Medical Record.

## 2022-10-28 NOTE — PROGRESS NOTE ADULT - SUBJECTIVE AND OBJECTIVE BOX
Patient is a 87y old  Female who presents with a chief complaint of Metabolic Encephalopathy with decline in function (21 Oct 2022 11:53)      HPI:  Patient is a 88 y/o female with PMHx of MDS ( on Vidaza- gets every 28 days- just finished course- due in around 3 weeks) HTN, HLD, A-Fib ( On Coumadin 5mg daily), hypothyroid, neuropathy of B/L LE, Trigeminal Neuralgia ( Prior Gamma knife procedure for pain), B/L OA of the knees, prior CCY who presented to [facility] s/p fall at home. Patient was ambulating at home with the walker when she fell onto the right side. She doesn't have the best recollection of the event but denies LOC. She has a home health aide and her grandson was also home at the time. She was able to stand and walk but presented as notes ongoing dizziness and pain over her right chest. Pain over the right side is dull, worse with motion, but not sharp. She notes dizziness notable after fall but not prior. With the dizziness she gets occasional nausea. Prior to fall she denies any change in medications, new medications, recent illness, nor any other features out different from her baseline .  Patient noted on workup to have right sided fracture of the 7-10th right ribs without displacement. On Incentive she can do around 500 but without respiratory distress or hemodynamic instability. She was also noted on CT to have a small right sub arachnoid hemorrhage. Admitted to surgery and will have a SICU consult with possible SICU admission due to trauma burden. She was also noted to have a sodium of 120 and was on HCTZ. She has had a slow, improvement in her Na+ to 126 on fluid restriction and Na+ tabs. Neuro w/u revealed her SAH to be secondary to vascular calcification with a normal MRA and MRV and no further w/u recommended.     She is medically stable, but states that she has been confused and weak since about 1 week PTA. She requires CG/ min assist for transfers and CG for ambulation with a RW 40 feet only secondary to impaired endurance and mod assistance for LE dressing.   PTA, she live with her daughter and had a HHA 8 hrs. a day, 5 days a week. She reports being able to transfer and ambulate with a RW with supervision PTA and being able to dress herself. She was evaluated by me on the Trauma Service and was found to be a good acute inpatient rehab candidate.  (20 Oct 2022 13:56)    TODAY'S SUBJECTIVE & REVIEW OF SYMPTOMS:    Seen this morning. No new complaints. Denies pain. Participating in rehab. Na improved to 137.INR back down to 1.84, will give home dose of 5mg. Patient otherwise has no acute complaints. she had BM yesterday.    CLOF: bed mobility TA, transfers CS  , Ambulating 150' RW TA      Constiutional:    [ x  ] WNL        [   ] poor appetite   [   ] insomnia   [   ] tired      ENT:                                             [ x ]  , [place]      Cardio:                [ x  ] WNL           [   ] CP   [   ] BROWN   [   ] palpitations               Resp:                   [  x ] WNL           [   ] SOB   [   ] cough   [   ] wheezing   GI:                        [   ] WNL           [ x  ] constipation   [   ] diarrhea   [   ] abdominal pain   [   ] nausea   [   ] emesis                                :                      [x   ] WNL           [   ] KENNY  [   ] dysuria   [   ] difficulty voiding             Endo:                   [ x  ] WNL          [   ] polyuria   [   ] temperature intolerance                 Skin:                     [ x  ] WNL          [   ] pain   [   ] wound   [   ] rash   MSK:                    [   ] WNL          [   ] muscle pain   [ x  ] joint pain/ stiffness both knees   [   ] muscle tenderness   [   ] swelling   Neuro:                 [   ] WNL          [   ] HA   [   ] change in vision   [   ] tremor   [x   ] weakness   [   ]dysphagia              Cognitive:           [  x ] WNL           [   ]confusion      Psych:                  [   ] WNL           [   ] hallucinations   [   ]agitation   [   ] delusion   [   ]depression [ x] anxiety      PHYSICAL EXAMVital Signs Last 24 Hrs  Vital Signs Last 24 Hrs  T(C): 36.7 (28 Oct 2022 05:02), Max: 37.2 (27 Oct 2022 12:10)  T(F): 98 (28 Oct 2022 05:02), Max: 98.9 (27 Oct 2022 12:10)  HR: 96 (28 Oct 2022 05:02) (85 - 96)  BP: 122/76 (28 Oct 2022 05:02) (122/76 - 138/84)  BP(mean): --  RR: 18 (28 Oct 2022 05:02) (18 - 20)  SpO2: --        Constitutional - [ x  ] NAD, Comfortable        [   ] other:  Chest - [ x   ] CTA     [   ] other:  Cardiovascular - [  x ] RRR, no murmer     [   ] other:  Abdomen - [ x  ] Soft, NT/ND      [   ] other:        - [ x ]  No KENNY CATHETER   [   ] YES  if yes: [   ] NO MEATAL TEAR OR DISCHARGE [   ] other:  Extremities - [ x ]  No C/C/E, No calf tenderness       [ x  ] other: mild calf tenderness in BLE L worse than R, mild swelling, no erythema   ROM - [ x  ] WFL     [   ] other: chronic arthritic changes both knees. Atrophy of intrinsic muscles of hands                                     Neurologic Exam -                 Cognitive - [ x  ]Awake, Alert, AAO to self, place, date, year, situation         [    ] other:       Communication - [ x  ]Fluent, No dysarthria       [   ] other:      Motor - No focal deficits                    Right UE -  [ x  ] WNL      [    ] other:                    Left UE -     [ x  ] WNL      [    ] other:                    Right LE -   [  x ] WNL       [    ] other:                    Left LE -      [x   ]WNL        [    ] other:      Sensory - [  x ] Intact to LT, burning dysesthesias both feet    [    ] other:          Reflexes - [ x  ] wnl/ symmetric     [   ] other:     Psychiatric - [ x  ]Mood stable, Affect WNL     [   ]other:     Skin - [  x ] intact      [   ] other    MEDICATIONS  (STANDING):  acetaminophen     Tablet .. 650 milliGRAM(s) Oral every 6 hours  atorvastatin 10 milliGRAM(s) Oral at bedtime  DULoxetine 30 milliGRAM(s) Oral every 12 hours  enoxaparin Injectable 40 milliGRAM(s) SubCutaneous every 24 hours  glycerin Suppository - Adult 1 Suppository(s) Rectal once  influenza  Vaccine (HIGH DOSE) 0.7 milliLiter(s) IntraMuscular once  levothyroxine 50 MICROGram(s) Oral daily  lidocaine   4% Patch 1 Patch Transdermal every 24 hours  losartan 100 milliGRAM(s) Oral daily  magnesium oxide 400 milliGRAM(s) Oral three times a day with meals  metoprolol tartrate 25 milliGRAM(s) Oral every 12 hours  NIFEdipine XL 30 milliGRAM(s) Oral daily  oxybutynin 5 milliGRAM(s) Oral every 12 hours  pantoprazole    Tablet 40 milliGRAM(s) Oral before breakfast  polyethylene glycol 3350 17 Gram(s) Oral two times a day  senna 1 Tablet(s) Oral at bedtime  sodium chloride 2 Gram(s) Oral every 8 hours                            11.3   1.66  )-----------( 238      ( 28 Oct 2022 07:07 )             33.1     10-28    133<L>  |  98  |  12  ----------------------------<  106<H>  4.3   |  28  |  0.5<L>    Ca    9.0      28 Oct 2022 07:07      PT/INR - ( 28 Oct 2022 07:07 )   PT: 21.40 sec;   INR: 1.84 ratio             POCT Blood Glucose.: 89 mg/dL (10-24-22 @ 21:57)  POCT Blood Glucose.: 90 mg/dL (10-24-22 @ 16:48)           CAPILLARY BLOOD GLUCOSE    POCT Blood Glucose.: 89 mg/dL (24 Oct 2022 21:57)  POCT Blood Glucose.: 90 mg/dL (24 Oct 2022 16:48)    POCT Blood Glucose.: 115 mg/dL (10-22-22 @ 21:21)  POCT Blood Glucose.: 102 mg/dL (10-22-22 @ 16:44)  POCT Blood Glucose.: 108 mg/dL (10-21-22 @ 21:55)  POCT Blood Glucose.: 119 mg/dL (10-20-22 @ 21:09)  POCT Blood Glucose.: 109 mg/dL (10-20-22 @ 17:10)     Patient is a 87y old  Female who presents with a chief complaint of Metabolic Encephalopathy with decline in function (21 Oct 2022 11:53)      HPI:  Patient is a 86 y/o female with PMHx of MDS ( on Vidaza- gets every 28 days- just finished course- due in around 3 weeks) HTN, HLD, A-Fib ( On Coumadin 5mg daily), hypothyroid, neuropathy of B/L LE, Trigeminal Neuralgia ( Prior Gamma knife procedure for pain), B/L OA of the knees, prior CCY who presented to [facility] s/p fall at home. Patient was ambulating at home with the walker when she fell onto the right side. She doesn't have the best recollection of the event but denies LOC. She has a home health aide and her grandson was also home at the time. She was able to stand and walk but presented as notes ongoing dizziness and pain over her right chest. Pain over the right side is dull, worse with motion, but not sharp. She notes dizziness notable after fall but not prior. With the dizziness she gets occasional nausea. Prior to fall she denies any change in medications, new medications, recent illness, nor any other features out different from her baseline .  Patient noted on workup to have right sided fracture of the 7-10th right ribs without displacement. On Incentive she can do around 500 but without respiratory distress or hemodynamic instability. She was also noted on CT to have a small right sub arachnoid hemorrhage. Admitted to surgery and will have a SICU consult with possible SICU admission due to trauma burden. She was also noted to have a sodium of 120 and was on HCTZ. She has had a slow, improvement in her Na+ to 126 on fluid restriction and Na+ tabs. Neuro w/u revealed her SAH to be secondary to vascular calcification with a normal MRA and MRV and no further w/u recommended.     She is medically stable, but states that she has been confused and weak since about 1 week PTA. She requires CG/ min assist for transfers and CG for ambulation with a RW 40 feet only secondary to impaired endurance and mod assistance for LE dressing.   PTA, she live with her daughter and had a HHA 8 hrs. a day, 5 days a week. She reports being able to transfer and ambulate with a RW with supervision PTA and being able to dress herself. She was evaluated by me on the Trauma Service and was found to be a good acute inpatient rehab candidate.  (20 Oct 2022 13:56)    TODAY'S SUBJECTIVE & REVIEW OF SYMPTOMS:    Seen this morning. No new complaints. Denies pain. Participating in rehab. Na improved to 137.INR back down to 1.84, will give coumadin 6mg. Patient otherwise has no acute complaints. she had BM yesterday.    CLOF: bed mobility TA, transfers CS  , Ambulating 150' RW TA      Constiutional:    [ x  ] WNL        [   ] poor appetite   [   ] insomnia   [   ] tired      ENT:                                             [ x ]  , [place]      Cardio:                [ x  ] WNL           [   ] CP   [   ] BROWN   [   ] palpitations               Resp:                   [  x ] WNL           [   ] SOB   [   ] cough   [   ] wheezing   GI:                        [   ] WNL           [ x  ] constipation   [   ] diarrhea   [   ] abdominal pain   [   ] nausea   [   ] emesis                                :                      [x   ] WNL           [   ] KENNY  [   ] dysuria   [   ] difficulty voiding             Endo:                   [ x  ] WNL          [   ] polyuria   [   ] temperature intolerance                 Skin:                     [ x  ] WNL          [   ] pain   [   ] wound   [   ] rash   MSK:                    [   ] WNL          [   ] muscle pain   [ x  ] joint pain/ stiffness both knees   [   ] muscle tenderness   [   ] swelling   Neuro:                 [   ] WNL          [   ] HA   [   ] change in vision   [   ] tremor   [x   ] weakness   [   ]dysphagia              Cognitive:           [  x ] WNL           [   ]confusion      Psych:                  [   ] WNL           [   ] hallucinations   [   ]agitation   [   ] delusion   [   ]depression [ x] anxiety      PHYSICAL EXAMVital Signs Last 24 Hrs  Vital Signs Last 24 Hrs  T(C): 36.7 (28 Oct 2022 05:02), Max: 37.2 (27 Oct 2022 12:10)  T(F): 98 (28 Oct 2022 05:02), Max: 98.9 (27 Oct 2022 12:10)  HR: 96 (28 Oct 2022 05:02) (85 - 96)  BP: 122/76 (28 Oct 2022 05:02) (122/76 - 138/84)  BP(mean): --  RR: 18 (28 Oct 2022 05:02) (18 - 20)  SpO2: --        Constitutional - [ x  ] NAD, Comfortable        [   ] other:  Chest - [ x   ] CTA     [   ] other:  Cardiovascular - [  x ] RRR, no murmer     [   ] other:  Abdomen - [ x  ] Soft, NT/ND      [   ] other:        - [ x ]  No KENNY CATHETER   [   ] YES  if yes: [   ] NO MEATAL TEAR OR DISCHARGE [   ] other:  Extremities - [ x ]  No C/C/E, No calf tenderness       [ x  ] other: mild calf tenderness in BLE L worse than R, mild swelling, no erythema   ROM - [ x  ] WFL     [   ] other: chronic arthritic changes both knees. Atrophy of intrinsic muscles of hands                                     Neurologic Exam -                 Cognitive - [ x  ]Awake, Alert, AAO to self, place, date, year, situation         [    ] other:       Communication - [ x  ]Fluent, No dysarthria       [   ] other:      Motor - No focal deficits                    Right UE -  [ x  ] WNL      [    ] other:                    Left UE -     [ x  ] WNL      [    ] other:                    Right LE -   [  x ] WNL       [    ] other:                    Left LE -      [x   ]WNL        [    ] other:      Sensory - [  x ] Intact to LT, burning dysesthesias both feet    [    ] other:          Reflexes - [ x  ] wnl/ symmetric     [   ] other:     Psychiatric - [ x  ]Mood stable, Affect WNL     [   ]other:     Skin - [  x ] intact      [   ] other    MEDICATIONS  (STANDING):  acetaminophen     Tablet .. 650 milliGRAM(s) Oral every 6 hours  atorvastatin 10 milliGRAM(s) Oral at bedtime  DULoxetine 30 milliGRAM(s) Oral every 12 hours  enoxaparin Injectable 40 milliGRAM(s) SubCutaneous every 24 hours  glycerin Suppository - Adult 1 Suppository(s) Rectal once  influenza  Vaccine (HIGH DOSE) 0.7 milliLiter(s) IntraMuscular once  levothyroxine 50 MICROGram(s) Oral daily  lidocaine   4% Patch 1 Patch Transdermal every 24 hours  losartan 100 milliGRAM(s) Oral daily  magnesium oxide 400 milliGRAM(s) Oral three times a day with meals  metoprolol tartrate 25 milliGRAM(s) Oral every 12 hours  NIFEdipine XL 30 milliGRAM(s) Oral daily  oxybutynin 5 milliGRAM(s) Oral every 12 hours  pantoprazole    Tablet 40 milliGRAM(s) Oral before breakfast  polyethylene glycol 3350 17 Gram(s) Oral two times a day  senna 1 Tablet(s) Oral at bedtime  sodium chloride 2 Gram(s) Oral every 8 hours                            11.3   1.66  )-----------( 238      ( 28 Oct 2022 07:07 )             33.1     10-28    133<L>  |  98  |  12  ----------------------------<  106<H>  4.3   |  28  |  0.5<L>    Ca    9.0      28 Oct 2022 07:07      PT/INR - ( 28 Oct 2022 07:07 )   PT: 21.40 sec;   INR: 1.84 ratio             POCT Blood Glucose.: 89 mg/dL (10-24-22 @ 21:57)  POCT Blood Glucose.: 90 mg/dL (10-24-22 @ 16:48)           CAPILLARY BLOOD GLUCOSE    POCT Blood Glucose.: 89 mg/dL (24 Oct 2022 21:57)  POCT Blood Glucose.: 90 mg/dL (24 Oct 2022 16:48)    POCT Blood Glucose.: 115 mg/dL (10-22-22 @ 21:21)  POCT Blood Glucose.: 102 mg/dL (10-22-22 @ 16:44)  POCT Blood Glucose.: 108 mg/dL (10-21-22 @ 21:55)  POCT Blood Glucose.: 119 mg/dL (10-20-22 @ 21:09)  POCT Blood Glucose.: 109 mg/dL (10-20-22 @ 17:10)     Patient is a 87y old  Female who presents with a chief complaint of Metabolic Encephalopathy with decline in function (21 Oct 2022 11:53)      HPI:  Patient is a 86 y/o female with PMHx of MDS ( on Vidaza- gets every 28 days- just finished course- due in around 3 weeks) HTN, HLD, A-Fib ( On Coumadin 5mg daily), hypothyroid, neuropathy of B/L LE, Trigeminal Neuralgia ( Prior Gamma knife procedure for pain), B/L OA of the knees, prior CCY who presented to [facility] s/p fall at home. Patient was ambulating at home with the walker when she fell onto the right side. She doesn't have the best recollection of the event but denies LOC. She has a home health aide and her grandson was also home at the time. She was able to stand and walk but presented as notes ongoing dizziness and pain over her right chest. Pain over the right side is dull, worse with motion, but not sharp. She notes dizziness notable after fall but not prior. With the dizziness she gets occasional nausea. Prior to fall she denies any change in medications, new medications, recent illness, nor any other features out different from her baseline .  Patient noted on workup to have right sided fracture of the 7-10th right ribs without displacement. On Incentive she can do around 500 but without respiratory distress or hemodynamic instability. She was also noted on CT to have a small right sub arachnoid hemorrhage. Admitted to surgery and will have a SICU consult with possible SICU admission due to trauma burden. She was also noted to have a sodium of 120 and was on HCTZ. She has had a slow, improvement in her Na+ to 126 on fluid restriction and Na+ tabs. Neuro w/u revealed her SAH to be secondary to vascular calcification with a normal MRA and MRV and no further w/u recommended.     She is medically stable, but states that she has been confused and weak since about 1 week PTA. She requires CG/ min assist for transfers and CG for ambulation with a RW 40 feet only secondary to impaired endurance and mod assistance for LE dressing.   PTA, she live with her daughter and had a HHA 8 hrs. a day, 5 days a week. She reports being able to transfer and ambulate with a RW with supervision PTA and being able to dress herself. She was evaluated by me on the Trauma Service and was found to be a good acute inpatient rehab candidate.  (20 Oct 2022 13:56)    TODAY'S SUBJECTIVE & REVIEW OF SYMPTOMS:    Seen this morning. No new complaints. Denies pain. Participating in rehab. Na improved to 137.INR back down to 1.84, will give coumadin 6mg. Patient otherwise has no acute complaints. she had BM yesterday.    CLOF: bed mobility TA, transfers CS  , Ambulating 150' RW TA      Constiutional:    [ x  ] WNL        [   ] poor appetite   [   ] insomnia   [   ] tired      ENT:                                             [ x ]  , [email or web address]      Cardio:                [ x  ] WNL           [   ] CP   [   ] BROWN   [   ] palpitations               Resp:                   [  x ] WNL           [   ] SOB   [   ] cough   [   ] wheezing   GI:                        [   ] WNL           [ x  ] constipation   [   ] diarrhea   [   ] abdominal pain   [   ] nausea   [   ] emesis                                :                      [x   ] WNL           [   ] KENNY  [   ] dysuria   [   ] difficulty voiding             Endo:                   [ x  ] WNL          [   ] polyuria   [   ] temperature intolerance                 Skin:                     [ x  ] WNL          [   ] pain   [   ] wound   [   ] rash   MSK:                    [   ] WNL          [   ] muscle pain   [ x  ] joint pain/ stiffness both knees   [   ] muscle tenderness   [   ] swelling   Neuro:                 [   ] WNL          [   ] HA   [   ] change in vision   [   ] tremor   [x   ] weakness   [   ]dysphagia              Cognitive:           [  x ] WNL           [   ]confusion      Psych:                  [   ] WNL           [   ] hallucinations   [   ]agitation   [   ] delusion   [   ]depression [ x] anxiety      PHYSICAL EXAMVital Signs Last 24 Hrs  Vital Signs Last 24 Hrs  T(C): 36.7 (28 Oct 2022 05:02), Max: 37.2 (27 Oct 2022 12:10)  T(F): 98 (28 Oct 2022 05:02), Max: 98.9 (27 Oct 2022 12:10)  HR: 96 (28 Oct 2022 05:02) (85 - 96)  BP: 122/76 (28 Oct 2022 05:02) (122/76 - 138/84)  BP(mean): --  RR: 18 (28 Oct 2022 05:02) (18 - 20)  SpO2: --        Constitutional - [ x  ] NAD, Comfortable        [   ] other:  Chest - [ x   ] CTA     [   ] other:  Cardiovascular - [  x ] RRR, no murmer     [   ] other:  Abdomen - [ x  ] Soft, NT/ND      [   ] other:        - [ x ]  No KENNY CATHETER   [   ] YES  if yes: [   ] NO MEATAL TEAR OR DISCHARGE [   ] other:  Extremities - [ x ]  No C/C/E, No calf tenderness       [ x  ] other: mild calf tenderness in BLE L worse than R, mild swelling, no erythema   ROM - [ x  ] WFL     [   ] other: chronic arthritic changes both knees. Atrophy of intrinsic muscles of hands                                     Neurologic Exam -                 Cognitive - [ x  ]Awake, Alert, AAO to self, place, date, year, situation         [    ] other:       Communication - [ x  ]Fluent, No dysarthria       [   ] other:      Motor - No focal deficits                    Right UE -  [ x  ] WNL      [    ] other:                    Left UE -     [ x  ] WNL      [    ] other:                    Right LE -   [  x ] WNL       [    ] other:                    Left LE -      [x   ]WNL        [    ] other:      Sensory - [  x ] Intact to LT, burning dysesthesias both feet    [    ] other:          Reflexes - [ x  ] wnl/ symmetric     [   ] other:     Psychiatric - [ x  ]Mood stable, Affect WNL     [   ]other:     Skin - [  x ] intact      [   ] other    MEDICATIONS  (STANDING):  acetaminophen     Tablet .. 650 milliGRAM(s) Oral every 6 hours  atorvastatin 10 milliGRAM(s) Oral at bedtime  DULoxetine 30 milliGRAM(s) Oral every 12 hours  enoxaparin Injectable 40 milliGRAM(s) SubCutaneous every 24 hours  glycerin Suppository - Adult 1 Suppository(s) Rectal once  influenza  Vaccine (HIGH DOSE) 0.7 milliLiter(s) IntraMuscular once  levothyroxine 50 MICROGram(s) Oral daily  lidocaine   4% Patch 1 Patch Transdermal every 24 hours  losartan 100 milliGRAM(s) Oral daily  magnesium oxide 400 milliGRAM(s) Oral three times a day with meals  metoprolol tartrate 25 milliGRAM(s) Oral every 12 hours  NIFEdipine XL 30 milliGRAM(s) Oral daily  oxybutynin 5 milliGRAM(s) Oral every 12 hours  pantoprazole    Tablet 40 milliGRAM(s) Oral before breakfast  polyethylene glycol 3350 17 Gram(s) Oral two times a day  senna 1 Tablet(s) Oral at bedtime  sodium chloride 2 Gram(s) Oral every 8 hours                            11.3   1.66  )-----------( 238      ( 28 Oct 2022 07:07 )             33.1     10-28    133<L>  |  98  |  12  ----------------------------<  106<H>  4.3   |  28  |  0.5<L>    Ca    9.0      28 Oct 2022 07:07      PT/INR - ( 28 Oct 2022 07:07 )   PT: 21.40 sec;   INR: 1.84 ratio       CAPILLARY BLOOD GLUCOSE    POCT Blood Glucose.: 89 mg/dL (24 Oct 2022 21:57)  POCT Blood Glucose.: 90 mg/dL (24 Oct 2022 16:48)    POCT Blood Glucose.: 115 mg/dL (10-22-22 @ 21:21)  POCT Blood Glucose.: 102 mg/dL (10-22-22 @ 16:44)  POCT Blood Glucose.: 108 mg/dL (10-21-22 @ 21:55)  POCT Blood Glucose.: 119 mg/dL (10-20-22 @ 21:09)  POCT Blood Glucose.: 109 mg/dL (10-20-22 @ 17:10)

## 2022-10-28 NOTE — PROGRESS NOTE ADULT - ASSESSMENT
ASSESSMENT/PLAN    #Rehab of decline in mobility and cognition, s/p fall c/w Metabolic Encephalopathy from Hyponatremia. She also has multiple rib fractures 7- 10 on the right with pain   She notes confusion and weakness prior to her fall and neuro w/u was c/w vascular calcification (not SAH), and moderate generalized microvascular ischemia. She also has severe OA of both knees, further limiting her mobility and Peripheral Neuropathy of unclear etiology. She requires close medical supervision because of her hyponatremia currently being treated with fluid restriction and Sodium tablets. She requires and can tolerate 3 hrs a day of intensive interdisciplinary rehab including PT, OT and ST and Neuoropsych eval.   Improving    #Hyponatremia  - improving since admission, s/p IV saline, on fluid restriction and NaCl tabs 3 gms. tid.   - was on Trileptal, HCTZ on admission, stopped  - followed by Renal Medicine;  - monitor daily  - 127>125>134 > 137 >133  - wean salt tabs    # BLE calf pain  - tender to touch, left worse than right  - on coumadin already for afib    #constipation   - chronic problem  - senna daily, miralax BID, dulcolax daily   - suppository PRN  - give 4 oz MagCitrate 10/27    #Hypomagnesemia  - Continue supplementation and monitor    #Hypokalemia  - 3.5 > 3.9   - improved    #Peripheral Neuropathy  - dysesthesias of feet and atrophy of intrinsic muscles of hands  - unknown etiology  - was on Trileptal at home and now on Cymbalta    #History of Chronic Atrial Fibrillation  - rate controlled. Currently RRR  - Coumadin held prior to admission to rehab secondary to suspicion of SAH, now ruled out  - Resume coumadin home dose 5 mg daily and adjust for INR 2-3  - adjust coumadin according to daily INR  - INR 1.84 10/28. will give 5mg (home dose) coumadin  - if maintained in therapeutic range, can discontinue lovenox     #History of Myelodysplasia  - stable pancytopenia  - followed by Heme/Onc.   - On Vidaza every 28 days     #Osteoarthritis  - pain meds, modalities, ther-ex    #Chronic Microvascular Cerebrovascular Disease  - monitor    #HTN  - controlled on current meds    #HLD  - on statin    #[place]  - Pocket Talker  - consider outpatient Audiology eval    #Hypothyroidism  - on synthroid    #Pain control:   - rib fracture  - neuropathy pain  - Tylenol prn, Cymbalta    #GI/Bowel Mgmt: Contipation in hospital  - continue bowel meds and monitor    - Diet: Regular with salt tabs         Precautions / PROPHYLAXIS:      - Falls    - Ortho: Weight bearing status: WBAT      - DVT prophylaxis: coumadin (d/c Lovenox when INR therapeutic)     ASSESSMENT/PLAN    #Rehab of decline in mobility and cognition, s/p fall c/w Metabolic Encephalopathy from Hyponatremia. She also has multiple rib fractures 7- 10 on the right with pain   She notes confusion and weakness prior to her fall and neuro w/u was c/w vascular calcification (not SAH), and moderate generalized microvascular ischemia. She also has severe OA of both knees, further limiting her mobility and Peripheral Neuropathy of unclear etiology. She requires close medical supervision because of her hyponatremia currently being treated with fluid restriction and Sodium tablets. She requires and can tolerate 3 hrs a day of intensive interdisciplinary rehab including PT, OT and ST and Neuoropsych eval.   Improving    #Hyponatremia  - improving since admission, s/p IV saline, on fluid restriction and NaCl tabs 3 gms. tid.   - was on Trileptal, HCTZ on admission, stopped  - followed by Renal Medicine;  - monitor daily  - 127>125>134 > 137 >133  - wean salt tabs    # BLE calf pain  - tender to touch, left worse than right  - on coumadin already for afib    #constipation, improved  - chronic problem  - senna daily, miralax BID, dulcolax daily   - lactulose daily   - suppository PRN  - give 4 oz MagCitrate 10/27    #Hypomagnesemia  - Continue supplementation and monitor    #Hypokalemia  - 3.5 > 3.9   - improved    #Peripheral Neuropathy  - dysesthesias of feet and atrophy of intrinsic muscles of hands  - unknown etiology  - was on Trileptal at home and now on Cymbalta    #History of Chronic Atrial Fibrillation  - rate controlled. Currently RRR  - Coumadin held prior to admission to rehab secondary to suspicion of SAH, now ruled out  - Resume coumadin home dose 5 mg daily and adjust for INR 2-3  - adjust coumadin according to daily INR  - INR 1.84 10/28. will give 6mg (home dose is 5mg) coumadin  - if maintained in therapeutic range, can discontinue lovenox     #History of Myelodysplasia  - stable pancytopenia  - followed by Heme/Onc.   - On Vidaza every 28 days     #Osteoarthritis  - pain meds, modalities, ther-ex    #Chronic Microvascular Cerebrovascular Disease  - monitor    #HTN  - controlled on current meds    #HLD  - on statin    #[place]  - Pocket Talker  - consider outpatient Audiology eval    #Hypothyroidism  - on synthroid    #Pain control:   - rib fracture  - neuropathy pain  - Tylenol prn, Cymbalta    #GI/Bowel Mgmt: Contipation in hospital  - continue bowel meds and monitor    - Diet: Regular with salt tabs         Precautions / PROPHYLAXIS:      - Falls    - Ortho: Weight bearing status: WBAT      - DVT prophylaxis: coumadin (d/c Lovenox when INR therapeutic)     ASSESSMENT/PLAN    #Rehab of decline in mobility and cognition, s/p fall c/w Metabolic Encephalopathy from Hyponatremia. She also has multiple rib fractures 7- 10 on the right with pain   She notes confusion and weakness prior to her fall and neuro w/u was c/w vascular calcification (not SAH), and moderate generalized microvascular ischemia. She also has severe OA of both knees, further limiting her mobility and Peripheral Neuropathy of unclear etiology. She requires close medical supervision because of her hyponatremia currently being treated with fluid restriction and Sodium tablets. She requires and can tolerate 3 hrs a day of intensive interdisciplinary rehab including PT, OT and ST and Neuoropsych eval.   Improving    #Hyponatremia  - improving since admission, s/p IV saline, on fluid restriction and NaCl tabs 3 gms. tid.   - was on Trileptal, HCTZ on admission, stopped  - followed by Renal Medicine;  - monitor daily  - 127>125>134 > 137 >133  - Sodium borderline low on 10/18. Continue salt tabs 2 mg with meals and fluid restriction and monitor    # BLE calf pain  - tender to touch, left worse than right, no cord, no swelling, no discoloration  - on coumadin already for afib    #constipation, improved  - chronic problem  - senna daily, miralax BID, dulcolax daily   - lactulose daily   - suppository PRN  - had results 10/18.   - continue regimen and monitor    #Hypomagnesemia  - Continue supplementation and monitor    #Hypokalemia  - 3.5 > 3.9 > 4.3  - improved    #Peripheral Neuropathy  - dysesthesias of feet and atrophy of intrinsic muscles of hands  - unknown etiology  - was on Trileptal at home and now on Cymbalta    #History of Chronic Atrial Fibrillation  - rate controlled. Currently RRR  - Coumadin held prior to admission to rehab secondary to suspicion of SAH, now ruled out  - Resume coumadin home dose 5 mg daily and adjust for INR 2-3  - adjust coumadin according to daily INR  - INR 1.84 10/28. will give 6mg (home dose is 5mg) coumadin  - when gets in therapeutic range, discontinue lovenox     #History of Myelodysplasia  - stable pancytopenia  - followed by Heme/Onc.   - On Vidaza every 28 days  - monitor     #Osteoarthritis  - pain meds, modalities, ther-ex    #Chronic Microvascular Cerebrovascular Disease  - monitor    #HTN  - controlled on current meds    #HLD  - on statin    #[place]  - Pocket Talker  - consider outpatient Audiology eval    #Hypothyroidism  - on synthroid    #Pain control:   - rib fracture  - neuropathy pain  - Tylenol prn, Cymbalta    #GI/Bowel Mgmt: Contipation in hospital  - continue bowel meds and monitor    - Diet: Regular with salt tabs         Precautions / PROPHYLAXIS:      - Falls    - Ortho: Weight bearing status: WBAT      - DVT prophylaxis: coumadin (d/c Lovenox when INR therapeutic)

## 2022-10-29 LAB
INR BLD: 1.82 RATIO — HIGH (ref 0.65–1.3)
PROTHROM AB SERPL-ACNC: 21.1 SEC — HIGH (ref 9.95–12.87)

## 2022-10-29 RX ORDER — WARFARIN SODIUM 2.5 MG/1
6 TABLET ORAL ONCE
Refills: 0 | Status: COMPLETED | OUTPATIENT
Start: 2022-10-29 | End: 2022-10-29

## 2022-10-29 RX ADMIN — SODIUM CHLORIDE 2 GRAM(S): 9 INJECTION INTRAMUSCULAR; INTRAVENOUS; SUBCUTANEOUS at 22:00

## 2022-10-29 RX ADMIN — ENOXAPARIN SODIUM 40 MILLIGRAM(S): 100 INJECTION SUBCUTANEOUS at 13:16

## 2022-10-29 RX ADMIN — Medication 50 MICROGRAM(S): at 05:56

## 2022-10-29 RX ADMIN — LOSARTAN POTASSIUM 100 MILLIGRAM(S): 100 TABLET, FILM COATED ORAL at 05:58

## 2022-10-29 RX ADMIN — LIDOCAINE 1 PATCH: 4 CREAM TOPICAL at 22:00

## 2022-10-29 RX ADMIN — DULOXETINE HYDROCHLORIDE 30 MILLIGRAM(S): 30 CAPSULE, DELAYED RELEASE ORAL at 20:37

## 2022-10-29 RX ADMIN — Medication 650 MILLIGRAM(S): at 15:00

## 2022-10-29 RX ADMIN — WARFARIN SODIUM 6 MILLIGRAM(S): 2.5 TABLET ORAL at 21:59

## 2022-10-29 RX ADMIN — MAGNESIUM OXIDE 400 MG ORAL TABLET 400 MILLIGRAM(S): 241.3 TABLET ORAL at 08:59

## 2022-10-29 RX ADMIN — Medication 25 MILLIGRAM(S): at 05:56

## 2022-10-29 RX ADMIN — MAGNESIUM OXIDE 400 MG ORAL TABLET 400 MILLIGRAM(S): 241.3 TABLET ORAL at 13:17

## 2022-10-29 RX ADMIN — Medication 650 MILLIGRAM(S): at 20:37

## 2022-10-29 RX ADMIN — Medication 5 MILLIGRAM(S): at 05:57

## 2022-10-29 RX ADMIN — POLYETHYLENE GLYCOL 3350 17 GRAM(S): 17 POWDER, FOR SOLUTION ORAL at 05:58

## 2022-10-29 RX ADMIN — Medication 650 MILLIGRAM(S): at 13:15

## 2022-10-29 RX ADMIN — Medication 650 MILLIGRAM(S): at 06:00

## 2022-10-29 RX ADMIN — MAGNESIUM OXIDE 400 MG ORAL TABLET 400 MILLIGRAM(S): 241.3 TABLET ORAL at 20:36

## 2022-10-29 RX ADMIN — Medication 25 MILLIGRAM(S): at 20:38

## 2022-10-29 RX ADMIN — DULOXETINE HYDROCHLORIDE 30 MILLIGRAM(S): 30 CAPSULE, DELAYED RELEASE ORAL at 06:32

## 2022-10-29 RX ADMIN — Medication 650 MILLIGRAM(S): at 21:58

## 2022-10-29 RX ADMIN — PANTOPRAZOLE SODIUM 40 MILLIGRAM(S): 20 TABLET, DELAYED RELEASE ORAL at 06:11

## 2022-10-29 RX ADMIN — Medication 30 MILLIGRAM(S): at 05:57

## 2022-10-29 RX ADMIN — ATORVASTATIN CALCIUM 10 MILLIGRAM(S): 80 TABLET, FILM COATED ORAL at 22:00

## 2022-10-29 RX ADMIN — Medication 5 MILLIGRAM(S): at 20:37

## 2022-10-29 RX ADMIN — SODIUM CHLORIDE 2 GRAM(S): 9 INJECTION INTRAMUSCULAR; INTRAVENOUS; SUBCUTANEOUS at 05:58

## 2022-10-29 RX ADMIN — SODIUM CHLORIDE 2 GRAM(S): 9 INJECTION INTRAMUSCULAR; INTRAVENOUS; SUBCUTANEOUS at 13:17

## 2022-10-29 NOTE — PROGRESS NOTE ADULT - ASSESSMENT
ASSESSMENT/PLAN    #Rehab of decline in mobility and cognition, s/p fall c/w Metabolic Encephalopathy from Hyponatremia. She also has multiple rib fractures 7- 10 on the right with pain   She notes confusion and weakness prior to her fall and neuro w/u was c/w vascular calcification (not SAH), and moderate generalized microvascular ischemia. She also has severe OA of both knees, further limiting her mobility and Peripheral Neuropathy of unclear etiology. She requires close medical supervision because of her hyponatremia currently being treated with fluid restriction and Sodium tablets. She requires and can tolerate 3 hrs a day of intensive interdisciplinary rehab including PT, OT and ST and Neuoropsych eval.   Improving    #Hyponatremia  - improving since admission, s/p IV saline, on fluid restriction and NaCl tabs 3 gms. tid.   - was on Trileptal, HCTZ on admission, stopped  - followed by Renal Medicine;  - monitor daily  - 127>125>134 > 137 >133  - Sodium borderline low on 10/18. Continue salt tabs 2 mg with meals and fluid restriction and monitor    # BLE calf pain  - tender to touch, left worse than right, no cord, no swelling, no discoloration  - on coumadin already for afib    #constipation, improved  - chronic problem  - senna daily, miralax BID, dulcolax daily   - lactulose daily   - suppository PRN  - had results 10/18.   - continue regimen and monitor    #Hypomagnesemia  - Continue supplementation and monitor    #Hypokalemia  - 3.5 > 3.9 > 4.3  - improved    #Peripheral Neuropathy  - dysesthesias of feet and atrophy of intrinsic muscles of hands  - unknown etiology  - was on Trileptal at home and now on Cymbalta    #History of Chronic Atrial Fibrillation  - rate controlled. Currently RRR  - Coumadin held prior to admission to rehab secondary to suspicion of SAH, now ruled out  - Resume coumadin home dose 5 mg daily and adjust for INR 2-3  - adjust coumadin according to daily INR  - INR 1.82 on 10/29 and warfarin was ordered.    - when gets in therapeutic range, discontinue lovenox     #History of Myelodysplasia  - stable pancytopenia  - followed by Heme/Onc.   - On Vidaza every 28 days  - monitor     #Osteoarthritis  - pain meds, modalities, ther-ex    #Chronic Microvascular Cerebrovascular Disease  - monitor    #HTN  - controlled on current meds    #HLD  - on statin    #[place]  - Pocket Talker  - consider outpatient Audiology eval    #Hypothyroidism  - on synthroid    #Pain control:   - rib fracture  - neuropathy pain  - Tylenol prn, Cymbalta    #GI/Bowel Mgmt: Contipation in hospital  - continue bowel meds and monitor    - Diet: Regular with salt tabs         Precautions / PROPHYLAXIS:      - Falls    - Ortho: Weight bearing status: WBAT      - DVT prophylaxis: coumadin (d/c Lovenox when INR therapeutic)

## 2022-10-29 NOTE — PROGRESS NOTE ADULT - SUBJECTIVE AND OBJECTIVE BOX
Patient is a 87y old  Female who presents with a chief complaint of Metabolic Encephalopathy with decline in function (21 Oct 2022 11:53)      HPI:  Patient is a 86 y/o female with PMHx of MDS ( on Vidaza- gets every 28 days- just finished course- due in around 3 weeks) HTN, HLD, A-Fib ( On Coumadin 5mg daily), hypothyroid, neuropathy of B/L LE, Trigeminal Neuralgia ( Prior Gamma knife procedure for pain), B/L OA of the knees, prior CCY who [facility] s/p fall at home. Patient was ambulating at home with the walker when she fell onto the right side. She doesn't have the best recollection of the event but denies LOC. She has a home health aide and her grandson was also home at the time. She was able to stand and walk but presented as notes ongoing dizziness and pain over her right chest. Pain over the right side is dull, worse with motion, but not sharp. She notes dizziness notable after fall but not prior. With the dizziness she gets occasional nausea. Prior to fall she denies any change in medications, new medications, recent illness, nor any other features out different from her baseline .  Patient noted on workup to have right sided fracture of the 7-10th right ribs without displacement. On Incentive she can do around 500 but without respiratory distress or hemodynamic instability. She was also noted on CT to have a small right sub arachnoid hemorrhage. Admitted to surgery and will have a SICU consult with possible SICU admission due to trauma burden. She was also noted to have a sodium of 120 and was on HCTZ. She has had a slow, improvement in her Na+ to 126 on fluid restriction and Na+ tabs. Neuro w/u revealed her SAH to be secondary to vascular calcification with a normal MRA and MRV and no further w/u recommended.     She is medically stable, but states that she has been confused and weak since about 1 week PTA. She requires CG/ min assist for transfers and CG for ambulation with a RW 40 feet only secondary to impaired endurance and mod assistance for LE dressing.   PTA, she live with her daughter and had a HHA 8 hrs. a day, 5 days a week. She reports being able to transfer and ambulate with a RW with supervision PTA and being able to dress herself. She was evaluated by me on the Trauma Service and was found to be a good acute inpatient rehab candidate.  (20 Oct 2022 13:56)    TODAY'S SUBJECTIVE & REVIEW OF SYMPTOMS:    Seen at noon. No new complaints. Denies pain. Participating in rehab. INR is 1.82.    CLOF: bed mobility TA, transfers CS  , Ambulating 150' RW TA      Constitutional    [ x  ] WNL        [   ] poor appetite   [   ] insomnia   [   ] tired      ENT:                                             [ x ]  , [place]      Cardio:                [ x  ] WNL           [   ] CP   [   ] BROWN   [   ] palpitations               Resp:                   [  x ] WNL           [   ] SOB   [   ] cough   [   ] wheezing   GI:                        [   ] WNL           [ x  ] constipation   [   ] diarrhea   [   ] abdominal pain   [   ] nausea   [   ] emesis                                :                      [x   ] WNL           [   ] KENNY  [   ] dysuria   [   ] difficulty voiding             Endo:                   [ x  ] WNL          [   ] polyuria   [   ] temperature intolerance                 Skin:                     [ x  ] WNL          [   ] pain   [   ] wound   [   ] rash   MSK:                    [   ] WNL          [   ] muscle pain   [ x  ] joint pain/ stiffness both knees   [   ] muscle tenderness   [   ] swelling   Neuro:                 [   ] WNL          [   ] HA   [   ] change in vision   [   ] tremor   [x   ] weakness   [   ]dysphagia              Cognitive:           [  x ] WNL           [   ]confusion      Psych:                  [   ] WNL           [   ] hallucinations   [   ]agitation   [   ] delusion   [   ]depression [ x] anxiety      PHYSICAL EXAM  Vital Signs Last 24 Hrs  T(C): 35.4 (10-29-22 @ 12:51), Max: 35.6 (10-29-22 @ 05:05)  HR: 86 (10-29-22 @ 12:51) (86 - 99)  BP: 109/67 (10-29-22 @ 12:51) (109/67 - 173/86)  RR: 18 (10-29-22 @ 12:51) (18 - 19)  SpO2: --                    Constitutional - [ x  ] NAD, Comfortable        [   ] other:  Chest - [ x   ] CTA     [   ] other:  Cardiovascular - [  x ] RRR, no murmur     [   ] other:  Abdomen - [ x  ] Soft, NT/ND      [   ] other:        - [ x ]  No KENNY CATHETER   [   ] YES  if yes: [   ] NO MEATAL TEAR OR DISCHARGE [   ] other:  Extremities - [ x ]  No C/C/E, No calf tenderness       [ x  ] other: mild calf tenderness in BLE L worse than R, mild swelling, no erythema   ROM - [ x  ] WFL     [   ] other: chronic arthritic changes both knees. Atrophy of intrinsic muscles of hands                                     Neurologic Exam -                 Cognitive - [ x  ]Awake, Alert, AAO to self, place, date, year, situation         [    ] other:       Communication - [ x  ]Fluent, No dysarthria       [   ] other:      Motor - No focal deficits                    Right UE -  [ x  ] WNL      [    ] other:                    Left UE -     [ x  ] WNL      [    ] other:                    Right LE -   [  x ] WNL       [    ] other:                    Left LE -      [x   ]WNL        [    ] other:      Sensory - [  x ] Intact to LT, burning dysesthesias both feet    [    ] other:          Reflexes - [ x  ] wnl/ symmetric     [   ] other:     Psychiatric - [ x  ]Mood stable, Affect WNL     [   ]other:     Skin - [  x ] intact      [   ] other    MEDICATIONS  (STANDING):  acetaminophen     Tablet .. 650 milliGRAM(s) Oral every 6 hours  atorvastatin 10 milliGRAM(s) Oral at bedtime  DULoxetine 30 milliGRAM(s) Oral every 12 hours  enoxaparin Injectable 40 milliGRAM(s) SubCutaneous every 24 hours  glycerin Suppository - Adult 1 Suppository(s) Rectal once  influenza  Vaccine (HIGH DOSE) 0.7 milliLiter(s) IntraMuscular once  levothyroxine 50 MICROGram(s) Oral daily  lidocaine   4% Patch 1 Patch Transdermal every 24 hours  losartan 100 milliGRAM(s) Oral daily  magnesium oxide 400 milliGRAM(s) Oral three times a day with meals  metoprolol tartrate 25 milliGRAM(s) Oral every 12 hours  NIFEdipine XL 30 milliGRAM(s) Oral daily  oxybutynin 5 milliGRAM(s) Oral every 12 hours  pantoprazole    Tablet 40 milliGRAM(s) Oral before breakfast  polyethylene glycol 3350 17 Gram(s) Oral two times a day  senna 1 Tablet(s) Oral at bedtime  sodium chloride 2 Gram(s) Oral every 8 hours    10-28    133<L>  |  98  |  12  ----------------------------<  106<H>  4.3   |  28  |  0.5<L>    Ca    9.0      28 Oct 2022 07:07                              11.3   1.66  )-----------( 238      ( 28 Oct 2022 07:07 )             33.1       INR: 1.82 ratio (10-29-22 @ 06:55)                          11.3   1.66  )-----------( 238      ( 28 Oct 2022 07:07 )             33.1     10-28    133<L>  |  98  |  12  ----------------------------<  106<H>  4.3   |  28  |  0.5<L>    Ca    9.0      28 Oct 2022 07:07      PT/INR - ( 28 Oct 2022 07:07 )   PT: 21.40 sec;   INR: 1.84 ratio       CAPILLARY BLOOD GLUCOSE    POCT Blood Glucose.: 89 mg/dL (24 Oct 2022 21:57)  POCT Blood Glucose.: 90 mg/dL (24 Oct 2022 16:48)    POCT Blood Glucose.: 115 mg/dL (10-22-22 @ 21:21)  POCT Blood Glucose.: 102 mg/dL (10-22-22 @ 16:44)  POCT Blood Glucose.: 108 mg/dL (10-21-22 @ 21:55)  POCT Blood Glucose.: 119 mg/dL (10-20-22 @ 21:09)  POCT Blood Glucose.: 109 mg/dL (10-20-22 @ 17:10)

## 2022-10-30 LAB
ANION GAP SERPL CALC-SCNC: 8 MMOL/L — SIGNIFICANT CHANGE UP (ref 7–14)
BUN SERPL-MCNC: 18 MG/DL — SIGNIFICANT CHANGE UP (ref 10–20)
CALCIUM SERPL-MCNC: 8.6 MG/DL — SIGNIFICANT CHANGE UP (ref 8.4–10.5)
CHLORIDE SERPL-SCNC: 100 MMOL/L — SIGNIFICANT CHANGE UP (ref 98–110)
CO2 SERPL-SCNC: 26 MMOL/L — SIGNIFICANT CHANGE UP (ref 17–32)
CREAT SERPL-MCNC: 0.5 MG/DL — LOW (ref 0.7–1.5)
EGFR: 92 ML/MIN/1.73M2 — SIGNIFICANT CHANGE UP
GLUCOSE SERPL-MCNC: 93 MG/DL — SIGNIFICANT CHANGE UP (ref 70–99)
INR BLD: 2.21 RATIO — HIGH (ref 0.65–1.3)
MAGNESIUM SERPL-MCNC: 1.7 MG/DL — LOW (ref 1.8–2.4)
POTASSIUM SERPL-MCNC: 4.3 MMOL/L — SIGNIFICANT CHANGE UP (ref 3.5–5)
POTASSIUM SERPL-SCNC: 4.3 MMOL/L — SIGNIFICANT CHANGE UP (ref 3.5–5)
PROTHROM AB SERPL-ACNC: 25.8 SEC — HIGH (ref 9.95–12.87)
SODIUM SERPL-SCNC: 134 MMOL/L — LOW (ref 135–146)

## 2022-10-30 RX ORDER — WARFARIN SODIUM 2.5 MG/1
4 TABLET ORAL ONCE
Refills: 0 | Status: COMPLETED | OUTPATIENT
Start: 2022-10-30 | End: 2022-10-30

## 2022-10-30 RX ADMIN — Medication 50 MICROGRAM(S): at 06:43

## 2022-10-30 RX ADMIN — Medication 650 MILLIGRAM(S): at 19:39

## 2022-10-30 RX ADMIN — Medication 5 MILLIGRAM(S): at 06:43

## 2022-10-30 RX ADMIN — Medication 5 MILLIGRAM(S): at 18:30

## 2022-10-30 RX ADMIN — MAGNESIUM OXIDE 400 MG ORAL TABLET 400 MILLIGRAM(S): 241.3 TABLET ORAL at 13:06

## 2022-10-30 RX ADMIN — SODIUM CHLORIDE 2 GRAM(S): 9 INJECTION INTRAMUSCULAR; INTRAVENOUS; SUBCUTANEOUS at 13:06

## 2022-10-30 RX ADMIN — DULOXETINE HYDROCHLORIDE 30 MILLIGRAM(S): 30 CAPSULE, DELAYED RELEASE ORAL at 19:39

## 2022-10-30 RX ADMIN — SODIUM CHLORIDE 2 GRAM(S): 9 INJECTION INTRAMUSCULAR; INTRAVENOUS; SUBCUTANEOUS at 21:37

## 2022-10-30 RX ADMIN — MAGNESIUM OXIDE 400 MG ORAL TABLET 400 MILLIGRAM(S): 241.3 TABLET ORAL at 18:32

## 2022-10-30 RX ADMIN — LIDOCAINE 1 PATCH: 4 CREAM TOPICAL at 21:36

## 2022-10-30 RX ADMIN — ATORVASTATIN CALCIUM 10 MILLIGRAM(S): 80 TABLET, FILM COATED ORAL at 21:38

## 2022-10-30 RX ADMIN — Medication 25 MILLIGRAM(S): at 19:41

## 2022-10-30 RX ADMIN — MAGNESIUM OXIDE 400 MG ORAL TABLET 400 MILLIGRAM(S): 241.3 TABLET ORAL at 09:14

## 2022-10-30 RX ADMIN — ENOXAPARIN SODIUM 40 MILLIGRAM(S): 100 INJECTION SUBCUTANEOUS at 12:46

## 2022-10-30 RX ADMIN — LIDOCAINE 1 PATCH: 4 CREAM TOPICAL at 06:41

## 2022-10-30 RX ADMIN — LOSARTAN POTASSIUM 100 MILLIGRAM(S): 100 TABLET, FILM COATED ORAL at 06:43

## 2022-10-30 RX ADMIN — WARFARIN SODIUM 4 MILLIGRAM(S): 2.5 TABLET ORAL at 21:37

## 2022-10-30 RX ADMIN — Medication 650 MILLIGRAM(S): at 12:48

## 2022-10-30 RX ADMIN — PANTOPRAZOLE SODIUM 40 MILLIGRAM(S): 20 TABLET, DELAYED RELEASE ORAL at 06:43

## 2022-10-30 RX ADMIN — Medication 650 MILLIGRAM(S): at 13:00

## 2022-10-30 RX ADMIN — Medication 650 MILLIGRAM(S): at 06:42

## 2022-10-30 RX ADMIN — DULOXETINE HYDROCHLORIDE 30 MILLIGRAM(S): 30 CAPSULE, DELAYED RELEASE ORAL at 06:42

## 2022-10-30 RX ADMIN — Medication 30 MILLIGRAM(S): at 06:43

## 2022-10-30 RX ADMIN — SENNA PLUS 1 TABLET(S): 8.6 TABLET ORAL at 21:37

## 2022-10-30 RX ADMIN — Medication 25 MILLIGRAM(S): at 06:43

## 2022-10-30 RX ADMIN — Medication 650 MILLIGRAM(S): at 06:50

## 2022-10-30 RX ADMIN — SODIUM CHLORIDE 2 GRAM(S): 9 INJECTION INTRAMUSCULAR; INTRAVENOUS; SUBCUTANEOUS at 06:44

## 2022-10-30 NOTE — PROGRESS NOTE ADULT - SUBJECTIVE AND OBJECTIVE BOX
T(C): 36.6 (10-30-22 @ 05:01), Max: 36.6 (10-30-22 @ 05:01)  HR: 69 (10-30-22 @ 05:01) (69 - 108)  BP: 148/97 (10-30-22 @ 05:01) (109/53 - 148/97)  RR: 18 (10-30-22 @ 05:01) (18 - 20)  SpO2: --      Patient was stable overnight and expresses no new complaints     PE: see at  bed  side  with  rehab  resident  oob  to  wc  no new  c.o    Alert   LUNGS- clear  COR- RRR  ABD- SOFT, NT  EXTR- w/o edema  NEURO- stable    10-30    134<L>  |  100  |  18  ----------------------------<  93  4.3   |  26  |  0.5<L>    Ca    8.6      30 Oct 2022 06:55  Mg     1.7     10-30            INR: 2.21 ratio (10-30-22 @ 06:55)        Rehab of _Metabolic Encephalopathy with decline in function    Continue acute rehab program. pt.ot  and  [facility]

## 2022-10-31 LAB
ALBUMIN SERPL ELPH-MCNC: 3.9 G/DL — SIGNIFICANT CHANGE UP (ref 3.5–5.2)
ALP SERPL-CCNC: 111 U/L — SIGNIFICANT CHANGE UP (ref 30–115)
ALT FLD-CCNC: 13 U/L — SIGNIFICANT CHANGE UP (ref 0–41)
ANION GAP SERPL CALC-SCNC: 9 MMOL/L — SIGNIFICANT CHANGE UP (ref 7–14)
ANISOCYTOSIS BLD QL: SLIGHT — SIGNIFICANT CHANGE UP
AST SERPL-CCNC: 14 U/L — SIGNIFICANT CHANGE UP (ref 0–41)
BASOPHILS # BLD AUTO: 0.03 K/UL — SIGNIFICANT CHANGE UP (ref 0–0.2)
BASOPHILS NFR BLD AUTO: 2.6 % — HIGH (ref 0–1)
BILIRUB SERPL-MCNC: 0.4 MG/DL — SIGNIFICANT CHANGE UP (ref 0.2–1.2)
BUN SERPL-MCNC: 14 MG/DL — SIGNIFICANT CHANGE UP (ref 10–20)
BURR CELLS BLD QL SMEAR: PRESENT — SIGNIFICANT CHANGE UP
CALCIUM SERPL-MCNC: 9 MG/DL — SIGNIFICANT CHANGE UP (ref 8.4–10.4)
CHLORIDE SERPL-SCNC: 99 MMOL/L — SIGNIFICANT CHANGE UP (ref 98–110)
CO2 SERPL-SCNC: 27 MMOL/L — SIGNIFICANT CHANGE UP (ref 17–32)
CREAT SERPL-MCNC: 0.6 MG/DL — LOW (ref 0.7–1.5)
EGFR: 88 ML/MIN/1.73M2 — SIGNIFICANT CHANGE UP
EOSINOPHIL # BLD AUTO: 0.06 K/UL — SIGNIFICANT CHANGE UP (ref 0–0.7)
EOSINOPHIL NFR BLD AUTO: 5.2 % — SIGNIFICANT CHANGE UP (ref 0–8)
GIANT PLATELETS BLD QL SMEAR: PRESENT — SIGNIFICANT CHANGE UP
GLUCOSE SERPL-MCNC: 83 MG/DL — SIGNIFICANT CHANGE UP (ref 70–99)
HCT VFR BLD CALC: 33.2 % — LOW (ref 37–47)
HGB BLD-MCNC: 11.1 G/DL — LOW (ref 12–16)
INR BLD: 2.34 RATIO — HIGH (ref 0.65–1.3)
LYMPHOCYTES # BLD AUTO: 0.63 K/UL — LOW (ref 1.2–3.4)
LYMPHOCYTES # BLD AUTO: 53.9 % — HIGH (ref 20.5–51.1)
MACROCYTES BLD QL: SLIGHT — SIGNIFICANT CHANGE UP
MAGNESIUM SERPL-MCNC: 1.6 MG/DL — LOW (ref 1.8–2.4)
MANUAL SMEAR VERIFICATION: SIGNIFICANT CHANGE UP
MCHC RBC-ENTMCNC: 33.4 G/DL — SIGNIFICANT CHANGE UP (ref 32–37)
MCHC RBC-ENTMCNC: 34 PG — HIGH (ref 27–31)
MCV RBC AUTO: 101.8 FL — HIGH (ref 81–99)
MONOCYTES # BLD AUTO: 0.17 K/UL — SIGNIFICANT CHANGE UP (ref 0.1–0.6)
MONOCYTES NFR BLD AUTO: 14.8 % — HIGH (ref 1.7–9.3)
NEUTROPHILS # BLD AUTO: 0.24 K/UL — LOW (ref 1.4–6.5)
NEUTROPHILS NFR BLD AUTO: 20.9 % — LOW (ref 42.2–75.2)
OVALOCYTES BLD QL SMEAR: SLIGHT — SIGNIFICANT CHANGE UP
PLAT MORPH BLD: NORMAL — SIGNIFICANT CHANGE UP
PLATELET # BLD AUTO: 236 K/UL — SIGNIFICANT CHANGE UP (ref 130–400)
POIKILOCYTOSIS BLD QL AUTO: SLIGHT — SIGNIFICANT CHANGE UP
POLYCHROMASIA BLD QL SMEAR: SLIGHT — SIGNIFICANT CHANGE UP
POTASSIUM SERPL-MCNC: 4.3 MMOL/L — SIGNIFICANT CHANGE UP (ref 3.5–5)
POTASSIUM SERPL-SCNC: 4.3 MMOL/L — SIGNIFICANT CHANGE UP (ref 3.5–5)
PROMYELOCYTES # FLD: 0.9 % — HIGH (ref 0–0)
PROT SERPL-MCNC: 6.1 G/DL — SIGNIFICANT CHANGE UP (ref 6–8)
PROTHROM AB SERPL-ACNC: 27.4 SEC — HIGH (ref 9.95–12.87)
RBC # BLD: 3.26 M/UL — LOW (ref 4.2–5.4)
RBC # FLD: 15.6 % — HIGH (ref 11.5–14.5)
RBC BLD AUTO: ABNORMAL
SODIUM SERPL-SCNC: 135 MMOL/L — SIGNIFICANT CHANGE UP (ref 135–146)
VARIANT LYMPHS # BLD: 1.7 % — SIGNIFICANT CHANGE UP (ref 0–5)
WBC # BLD: 1.17 K/UL — LOW (ref 4.8–10.8)
WBC # FLD AUTO: 1.17 K/UL — LOW (ref 4.8–10.8)

## 2022-10-31 RX ORDER — WARFARIN SODIUM 2.5 MG/1
4 TABLET ORAL ONCE
Refills: 0 | Status: COMPLETED | OUTPATIENT
Start: 2022-10-31 | End: 2022-10-31

## 2022-10-31 RX ORDER — MAGNESIUM SULFATE 500 MG/ML
1 VIAL (ML) INJECTION ONCE
Refills: 0 | Status: COMPLETED | OUTPATIENT
Start: 2022-10-31 | End: 2022-10-31

## 2022-10-31 RX ORDER — SODIUM CHLORIDE 0.65 %
1 AEROSOL, SPRAY (ML) NASAL THREE TIMES A DAY
Refills: 0 | Status: DISCONTINUED | OUTPATIENT
Start: 2022-10-31 | End: 2022-11-02

## 2022-10-31 RX ORDER — SODIUM CHLORIDE 9 MG/ML
1 INJECTION INTRAMUSCULAR; INTRAVENOUS; SUBCUTANEOUS THREE TIMES A DAY
Refills: 0 | Status: DISCONTINUED | OUTPATIENT
Start: 2022-10-31 | End: 2022-11-02

## 2022-10-31 RX ADMIN — MAGNESIUM OXIDE 400 MG ORAL TABLET 400 MILLIGRAM(S): 241.3 TABLET ORAL at 12:00

## 2022-10-31 RX ADMIN — Medication 650 MILLIGRAM(S): at 17:34

## 2022-10-31 RX ADMIN — Medication 1 SPRAY(S): at 22:38

## 2022-10-31 RX ADMIN — Medication 650 MILLIGRAM(S): at 01:47

## 2022-10-31 RX ADMIN — Medication 25 MILLIGRAM(S): at 06:48

## 2022-10-31 RX ADMIN — SENNA PLUS 1 TABLET(S): 8.6 TABLET ORAL at 22:35

## 2022-10-31 RX ADMIN — Medication 650 MILLIGRAM(S): at 12:45

## 2022-10-31 RX ADMIN — Medication 650 MILLIGRAM(S): at 12:00

## 2022-10-31 RX ADMIN — WARFARIN SODIUM 4 MILLIGRAM(S): 2.5 TABLET ORAL at 22:37

## 2022-10-31 RX ADMIN — SODIUM CHLORIDE 1 GRAM(S): 9 INJECTION INTRAMUSCULAR; INTRAVENOUS; SUBCUTANEOUS at 22:36

## 2022-10-31 RX ADMIN — DULOXETINE HYDROCHLORIDE 30 MILLIGRAM(S): 30 CAPSULE, DELAYED RELEASE ORAL at 17:33

## 2022-10-31 RX ADMIN — Medication 5 MILLIGRAM(S): at 17:34

## 2022-10-31 RX ADMIN — PANTOPRAZOLE SODIUM 40 MILLIGRAM(S): 20 TABLET, DELAYED RELEASE ORAL at 06:48

## 2022-10-31 RX ADMIN — Medication 650 MILLIGRAM(S): at 17:54

## 2022-10-31 RX ADMIN — Medication 30 MILLIGRAM(S): at 06:48

## 2022-10-31 RX ADMIN — LIDOCAINE 1 PATCH: 4 CREAM TOPICAL at 22:35

## 2022-10-31 RX ADMIN — Medication 650 MILLIGRAM(S): at 06:49

## 2022-10-31 RX ADMIN — LIDOCAINE 1 PATCH: 4 CREAM TOPICAL at 06:42

## 2022-10-31 RX ADMIN — Medication 5 MILLIGRAM(S): at 06:48

## 2022-10-31 RX ADMIN — LOSARTAN POTASSIUM 100 MILLIGRAM(S): 100 TABLET, FILM COATED ORAL at 06:48

## 2022-10-31 RX ADMIN — SODIUM CHLORIDE 2 GRAM(S): 9 INJECTION INTRAMUSCULAR; INTRAVENOUS; SUBCUTANEOUS at 06:48

## 2022-10-31 RX ADMIN — Medication 650 MILLIGRAM(S): at 06:48

## 2022-10-31 RX ADMIN — MAGNESIUM OXIDE 400 MG ORAL TABLET 400 MILLIGRAM(S): 241.3 TABLET ORAL at 17:34

## 2022-10-31 RX ADMIN — LIDOCAINE 1 PATCH: 4 CREAM TOPICAL at 09:43

## 2022-10-31 RX ADMIN — Medication 50 MICROGRAM(S): at 06:48

## 2022-10-31 RX ADMIN — DULOXETINE HYDROCHLORIDE 30 MILLIGRAM(S): 30 CAPSULE, DELAYED RELEASE ORAL at 06:48

## 2022-10-31 RX ADMIN — Medication 25 MILLIGRAM(S): at 17:33

## 2022-10-31 RX ADMIN — ATORVASTATIN CALCIUM 10 MILLIGRAM(S): 80 TABLET, FILM COATED ORAL at 22:36

## 2022-10-31 RX ADMIN — Medication 100 GRAM(S): at 12:02

## 2022-10-31 RX ADMIN — SODIUM CHLORIDE 1 GRAM(S): 9 INJECTION INTRAMUSCULAR; INTRAVENOUS; SUBCUTANEOUS at 14:31

## 2022-10-31 RX ADMIN — MAGNESIUM OXIDE 400 MG ORAL TABLET 400 MILLIGRAM(S): 241.3 TABLET ORAL at 08:14

## 2022-10-31 RX ADMIN — ENOXAPARIN SODIUM 40 MILLIGRAM(S): 100 INJECTION SUBCUTANEOUS at 12:02

## 2022-10-31 RX ADMIN — Medication 650 MILLIGRAM(S): at 23:29

## 2022-10-31 NOTE — PROGRESS NOTE ADULT - ASSESSMENT
Pain: Yes Location: Hip and shoulder Ratin/10 Intervention: Notified MD     Pain rating after Intervention: N/A     Orientation: AOx4     Arousal Level: Alert     Behavior: Pleasant and Cooperative     Affect Range: WFL      Needed: No      Attention: WFL     Insight into illness/deficits: Good     Treatment Session Focused on Coping/Cognition     Ms. Murray was seen on 10/31/22 for a follow-up cognitive rehabilitation session. She had difficulty viewing stimuli on the computer screen which led to difficulty using the computerized cognitive rehabilitation program. We attempted to shift to practicing verbal fluency tasks given Ms. Murray’s word-finding difficulties and previous cognitive rehabilitation session. However, these tasks were also discontinued as Ms. Murray reported an increase in mental “fogginess” and fatigue. She stated that her fogginess had worsened a few days ago, around the same time that she began to experience increased disruptions in her sleep. Her physiatrist was notified about this increased fogginess and sleep disruption. We will follow-up with Ms. Murray again before discharge to see if she is interested in further evaluation and/or cognitive rehabilitation sessions.     Goals:  Further Cognitive Assessment prior to discharge     Plan: 1-2 times a week 30-60 minutes

## 2022-10-31 NOTE — PROGRESS NOTE ADULT - ASSESSMENT
ASSESSMENT/PLAN    #Rehab of decline in mobility and cognition, s/p fall c/w Metabolic Encephalopathy from Hyponatremia. She also has multiple rib fractures 7- 10 on the right with pain   She notes confusion and weakness prior to her fall and neuro w/u was c/w vascular calcification (not SAH), and moderate generalized microvascular ischemia. She also has severe OA of both knees, further limiting her mobility and Peripheral Neuropathy of unclear etiology. She requires close medical supervision because of her hyponatremia currently being treated with fluid restriction and Sodium tablets. She requires and can tolerate 3 hrs a day of intensive interdisciplinary rehab including PT, OT and ST and Neuoropsych eval.   Improving    #Hyponatremia  - improving since admission, s/p IV saline, on fluid restriction and NaCl tabs.  - was on Trileptal, HCTZ on admission, stopped  - followed by Renal Medicine;  - monitor daily  - 127>125>134 > 137 >133>135  - Sodium borderline low on 10/18. Continue fluid restriction and monitor.   - Adjusted salt tabs to 1g TID. Will continue to monitor Na levels.     # BLE calf pain  - tender to touch, left worse than right, no cord, no swelling, no discoloration  - on coumadin already for afib    #constipation, improved  - chronic problem  - senna daily, miralax BID, dulcolax daily   - lactulose daily   - suppository PRN  - had results 10/18.   - continue regimen and monitor    #Hypomagnesemia  - Continue supplementation and monitor    #Hypokalemia  - 3.5 > 3.9 > 4.3>4.3  - improved    #Peripheral Neuropathy  - dysesthesias of feet and atrophy of intrinsic muscles of hands  - unknown etiology  - was on Trileptal at home and now on Cymbalta    #History of Chronic Atrial Fibrillation  - rate controlled. Currently RRR  - Coumadin held prior to admission to rehab secondary to suspicion of SAH, now ruled out  - Resume coumadin home dose 5 mg daily and adjust for INR 2-3  - adjust coumadin according to daily INR  - INR 2.34, Gave Warfarin 4 today. Will continue to monitor  - when gets in therapeutic range, discontinue lovenox     #History of Myelodysplasia  - stable pancytopenia  - followed by Heme/Onc.   - On Vidaza every 28 days  - monitor     #Osteoarthritis  - pain meds, modalities, ther-ex    #Chronic Microvascular Cerebrovascular Disease  - monitor    #HTN  - controlled on current meds    #HLD  - on statin    #[place]  - [employer]  - consider outpatient Audiology eval    #Hypothyroidism  - on synthroid    #Pain control:   - rib fracture  - neuropathy pain  - Tylenol prn, Cymbalta    #GI/Bowel Mgmt: Contipation in hospital  - continue bowel meds and monitor    - Diet: Regular with salt tabs         Precautions / PROPHYLAXIS:      - Falls    - Ortho: Weight bearing status: WBAT      - DVT prophylaxis: coumadin (d/c Lovenox when INR therapeutic)     ASSESSMENT/PLAN    #Rehab of decline in mobility and cognition, s/p fall c/w Metabolic Encephalopathy from Hyponatremia. She also has multiple rib fractures 7- 10 on the right with pain   She notes confusion and weakness prior to her fall and neuro w/u was c/w vascular calcification (not SAH), and moderate generalized microvascular ischemia. She also has severe OA of both knees, further limiting her mobility and Peripheral Neuropathy of unclear etiology. She requires close medical supervision because of her hyponatremia currently being treated with fluid restriction and Sodium tablets. She requires and can tolerate 3 hrs a day of intensive interdisciplinary rehab including PT, OT and ST and Neuoropsych eval.   Improving    #Hyponatremia  - improving since admission, s/p IV saline, on fluid restriction and NaCl tabs.  - was on Trileptal, HCTZ on admission, stopped  - followed by Renal Medicine;  - monitor daily  - 127>125>134 > 137 >133>135  - Sodium borderline low on 10/18. Continue fluid restriction and monitor.   - Adjusted salt tabs to 1g TID. Will continue to monitor Na levels.     # BLE calf pain  - tender to touch, left worse than right, no cord, no swelling, no discoloration  - on coumadin already for afib    #constipation, improved  - chronic problem  - senna daily, miralax BID, dulcolax daily   - lactulose daily   - suppository PRN  - had results 10/18.   - continue regimen and monitor    #Hypomagnesemia  - Continue supplementation and monitor    #Hypokalemia  - 3.5 > 3.9 > 4.3>4.3  - improved    #Peripheral Neuropathy  - dysesthesias of feet and atrophy of intrinsic muscles of hands  - unknown etiology  - was on Trileptal at home and now on Cymbalta    #History of Chronic Atrial Fibrillation  - rate controlled. Currently RRR  - Coumadin held prior to admission to rehab secondary to suspicion of SAH, now ruled out  - Resume coumadin home dose 5 mg daily and adjust for INR 2-3  - adjust coumadin according to daily INR  - INR 2.34, Gave Warfarin 4 today. Will continue to monitor    #History of Myelodysplasia  - stable pancytopenia  - followed by Heme/Onc.   - On Vidaza every 28 days  - monitor     #Osteoarthritis  - pain meds, modalities, ther-ex    #Chronic Microvascular Cerebrovascular Disease  - monitor    #HTN  - controlled on current meds    #HLD  - on statin    #[place]  - [employer]  - consider outpatient Audiology eval    #Hypothyroidism  - on synthroid    #Pain control:   - rib fracture  - neuropathy pain  - Tylenol prn, Cymbalta    #GI/Bowel Mgmt: Contipation in hospital  - continue bowel meds and monitor    - Diet: Regular with salt tabs         Precautions / PROPHYLAXIS:      - Falls    - Ortho: Weight bearing status: WBAT      - DVT prophylaxis: coumadin (d/c Lovenox when INR therapeutic)

## 2022-10-31 NOTE — PROGRESS NOTE ADULT - SUBJECTIVE AND OBJECTIVE BOX
Patient is a 87y old  Female who presents with a chief complaint of Metabolic Encephalopathy with decline in function (21 Oct 2022 11:53)      HPI:  Patient is a 86 y/o female with PMHx of MDS ( on Vidaza- gets every 28 days- just finished course- due in around 3 weeks) HTN, HLD, A-Fib ( On Coumadin 5mg daily), hypothyroid, neuropathy of B/L LE, Trigeminal Neuralgia ( Prior Gamma knife procedure for pain), B/L OA of the knees, prior CCY who presented to [facility] s/p fall at home. Patient was ambulating at home with the walker when she fell onto the right side. She doesn't have the best recollection of the event but denies LOC. She has a home health aide and her grandson was also home at the time. She was able to stand and walk but presented as notes ongoing dizziness and pain over her right chest. Pain over the right side is dull, worse with motion, but not sharp. She notes dizziness notable after fall but not prior. With the dizziness she gets occasional nausea. Prior to fall she denies any change in medications, new medications, recent illness, nor any other features out different from her baseline .  Patient noted on workup to have right sided fracture of the 7-10th right ribs without displacement. On Incentive she can do around 500 but without respiratory distress or hemodynamic instability. She was also noted on CT to have a small right sub arachnoid hemorrhage. Admitted to surgery and will have a SICU consult with possible SICU admission due to trauma burden. She was also noted to have a sodium of 120 and was on HCTZ. She has had a slow, improvement in her Na+ to 126 on fluid restriction and Na+ tabs. Neuro w/u revealed her SAH to be secondary to vascular calcification with a normal MRA and MRV and no further w/u recommended.     She is medically stable, but states that she has been confused and weak since about 1 week PTA. She requires CG/ min assist for transfers and CG for ambulation with a RW 40 feet only secondary to impaired endurance and mod assistance for LE dressing.   PTA, she live with her daughter and had a HHA 8 hrs. a day, 5 days a week. She reports being able to transfer and ambulate with a RW with supervision PTA and being able to dress herself. She was evaluated by me on the Trauma Service and was found to be a good acute inpatient rehab candidate.  (20 Oct 2022 13:56)    TODAY'S SUBJECTIVE & REVIEW OF SYMPTOMS:    Patient seen and examined bedside in the AM. No acute overnight events. INR this morning 2.34. Na 133. Will adjust salt tabs to 1g TID today.   Denies any chest pain, nausea, vomiting, abdominal pain, or headache. Voiding bladder and bowels spontaneously. Participating and tolerating PT/OT.     CLOF: bed mobility TA, transfers CS  , Ambulating 150' RW TA      Constitutional    [ x  ] WNL        [   ] poor appetite   [   ] insomnia   [   ] tired      ENT:                                             [ x ]  , [place]      Cardio:                [ x  ] WNL           [   ] CP   [   ] BROWN   [   ] palpitations               Resp:                   [  x ] WNL           [   ] SOB   [   ] cough   [   ] wheezing   GI:                        [   ] WNL           [ x  ] constipation   [   ] diarrhea   [   ] abdominal pain   [   ] nausea   [   ] emesis                                :                      [x   ] WNL           [   ] KENNY  [   ] dysuria   [   ] difficulty voiding             Endo:                   [ x  ] WNL          [   ] polyuria   [   ] temperature intolerance                 Skin:                     [ x  ] WNL          [   ] pain   [   ] wound   [   ] rash   MSK:                    [   ] WNL          [   ] muscle pain   [ x  ] joint pain/ stiffness both knees   [   ] muscle tenderness   [   ] swelling   Neuro:                 [   ] WNL          [   ] HA   [   ] change in vision   [   ] tremor   [x   ] weakness   [   ]dysphagia              Cognitive:           [  x ] WNL           [   ]confusion      Psych:                  [   ] WNL           [   ] hallucinations   [   ]agitation   [   ] delusion   [   ]depression [ x] anxiety      PHYSICAL EXAM  Vital Signs Last 24 Hrs  T(C): 35.6 (31 Oct 2022 12:41), Max: 36.3 (31 Oct 2022 05:01)  T(F): 96 (31 Oct 2022 12:41), Max: 97.3 (31 Oct 2022 05:01)  HR: 88 (31 Oct 2022 12:41) (88 - 107)  BP: 115/56 (31 Oct 2022 12:41) (115/56 - 128/73)  BP(mean): 92 (30 Oct 2022 21:30) (92 - 92)  RR: 18 (31 Oct 2022 12:41) (18 - 20)  SpO2: --            Constitutional - [ x  ] NAD, Comfortable        [   ] other:  Chest - [ x   ] CTA     [   ] other:  Cardiovascular - [  x ] RRR, no murmur     [   ] other:  Abdomen - [ x  ] Soft, NT/ND      [   ] other:        - [ x ]  No KENNY CATHETER   [   ] YES  if yes: [   ] NO MEATAL TEAR OR DISCHARGE [   ] other:  Extremities - [ x ]  No C/C/E, No calf tenderness       [ x  ] other: mild calf tenderness in BLE L worse than R, mild swelling, no erythema   ROM - [ x  ] WFL     [   ] other: chronic arthritic changes both knees. Atrophy of intrinsic muscles of hands                                     Neurologic Exam -                 Cognitive - [ x  ]Awake, Alert, AAO to self, place, date, year, situation         [    ] other:       Communication - [ x  ]Fluent, No dysarthria       [   ] other:      Motor - No focal deficits                    Right UE -  [ x  ] WNL      [    ] other:                    Left UE -     [ x  ] WNL      [    ] other:                    Right LE -   [  x ] WNL       [    ] other:                    Left LE -      [x   ]WNL        [    ] other:      Sensory - [  x ] Intact to LT, burning dysesthesias both feet    [    ] other:          Reflexes - [ x  ] wnl/ symmetric     [   ] other:     Psychiatric - [ x  ]Mood stable, Affect WNL     [   ]other:     Skin - [  x ] intact      [   ] other    MEDICATIONS  (STANDING):  acetaminophen     Tablet .. 650 milliGRAM(s) Oral every 6 hours  atorvastatin 10 milliGRAM(s) Oral at bedtime  bisacodyl 10 milliGRAM(s) Oral daily  DULoxetine 30 milliGRAM(s) Oral every 12 hours  enoxaparin Injectable 40 milliGRAM(s) SubCutaneous every 24 hours  glycerin Suppository - Adult 1 Suppository(s) Rectal once  influenza  Vaccine (HIGH DOSE) 0.7 milliLiter(s) IntraMuscular once  lactulose Syrup 10 Gram(s) Oral daily  levothyroxine 50 MICROGram(s) Oral daily  lidocaine   4% Patch 1 Patch Transdermal every 24 hours  losartan 100 milliGRAM(s) Oral daily  magnesium oxide 400 milliGRAM(s) Oral three times a day with meals  metoprolol tartrate 25 milliGRAM(s) Oral every 12 hours  NIFEdipine XL 30 milliGRAM(s) Oral daily  oxybutynin 5 milliGRAM(s) Oral every 12 hours  pantoprazole    Tablet 40 milliGRAM(s) Oral before breakfast  polyethylene glycol 3350 17 Gram(s) Oral two times a day  senna 1 Tablet(s) Oral at bedtime  sodium chloride 1 Gram(s) Oral three times a day  warfarin 4 milliGRAM(s) Oral once    MEDICATIONS  (PRN):  magnesium hydroxide Suspension 30 milliLiter(s) Oral daily PRN Constipation                            11.1   1.17  )-----------( 236      ( 31 Oct 2022 08:02 )             33.2     10-31    135  |  99  |  14  ----------------------------<  83  4.3   |  27  |  0.6<L>    Ca    9.0      31 Oct 2022 08:02  Mg     1.6     10-31    TPro  6.1  /  Alb  3.9  /  TBili  0.4  /  DBili  x   /  AST  14  /  ALT  13  /  AlkPhos  111  10-31    PT/INR - ( 31 Oct 2022 08:02 )   PT: 27.40 sec;   INR: 2.34 ratio                  Patient is a 87y old  Female who presents with a chief complaint of Metabolic Encephalopathy with decline in function (21 Oct 2022 11:53)      HPI:  Patient is a 88 y/o female with PMHx of MDS ( on Vidaza- gets every 28 days- just finished course- due in around 3 weeks) HTN, HLD, A-Fib ( On Coumadin 5mg daily), hypothyroid, neuropathy of B/L LE, Trigeminal Neuralgia ( Prior Gamma knife procedure for pain), B/L OA of the knees, prior CCY who presented to [facility] s/p fall at home. Patient was ambulating at home with the walker when she fell onto the right side. She doesn't have the best recollection of the event but denies LOC. She has a home health aide and her grandson was also home at the time. She was able to stand and walk but presented as notes ongoing dizziness and pain over her right chest. Pain over the right side is dull, worse with motion, but not sharp. She notes dizziness notable after fall but not prior. With the dizziness she gets occasional nausea. Prior to fall she denies any change in medications, new medications, recent illness, nor any other features out different from her baseline .  Patient noted on workup to have right sided fracture of the 7-10th right ribs without displacement. On Incentive she can do around 500 but without respiratory distress or hemodynamic instability. She was also noted on CT to have a small right sub arachnoid hemorrhage. Admitted to surgery and will have a SICU consult with possible SICU admission due to trauma burden. She was also noted to have a sodium of 120 and was on HCTZ. She has had a slow, improvement in her Na+ to 126 on fluid restriction and Na+ tabs. Neuro w/u revealed her SAH to be secondary to vascular calcification with a normal MRA and MRV and no further w/u recommended.     She is medically stable, but states that she has been confused and weak since about 1 week PTA. She requires CG/ min assist for transfers and CG for ambulation with a RW 40 feet only secondary to impaired endurance and mod assistance for LE dressing.   PTA, she live with her daughter and had a HHA 8 hrs. a day, 5 days a week. She reports being able to transfer and ambulate with a RW with supervision PTA and being able to dress herself. She was evaluated by me on the Trauma Service and was found to be a good acute inpatient rehab candidate.  (20 Oct 2022 13:56)    TODAY'S SUBJECTIVE & REVIEW OF SYMPTOMS:    Patient seen and examined bedside in the AM. No acute overnight events. INR this morning 2.34. Na 133. Will adjust salt tabs to 1g TID today.   Denies any chest pain, nausea, vomiting, abdominal pain, or headache. Voiding bladder and bowels spontaneously. Participating and tolerating PT/OT.     CLOF: bed mobility TA, transfers CS  , Ambulating 150' RW TA      Constitutional    [ x  ] WNL        [   ] poor appetite   [   ] insomnia   [   ] tired      ENT:                                             [ x ]  , [place]      Cardio:                [ x  ] WNL           [   ] CP   [   ] BROWN   [   ] palpitations               Resp:                   [  x ] WNL           [   ] SOB   [   ] cough   [   ] wheezing   GI:                        [   ] WNL           [ x  ] constipation   [   ] diarrhea   [   ] abdominal pain   [   ] nausea   [   ] emesis                                :                      [x   ] WNL           [   ] KENNY  [   ] dysuria   [   ] difficulty voiding             Endo:                   [ x  ] WNL          [   ] polyuria   [   ] temperature intolerance                 Skin:                     [ x  ] WNL          [   ] pain   [   ] wound   [   ] rash   MSK:                    [   ] WNL          [   ] muscle pain   [ x  ] joint pain/ stiffness both knees   [   ] muscle tenderness   [   ] swelling   Neuro:                 [   ] WNL          [   ] HA   [   ] change in vision   [   ] tremor   [x   ] weakness   [   ]dysphagia              Cognitive:           [  x ] WNL           [   ]confusion      Psych:                  [   ] WNL           [   ] hallucinations   [   ]agitation   [   ] delusion   [   ]depression [ x] anxiety      PHYSICAL EXAM  Vital Signs Last 24 Hrs  T(C): 35.6 (31 Oct 2022 12:41), Max: 36.3 (31 Oct 2022 05:01)  T(F): 96 (31 Oct 2022 12:41), Max: 97.3 (31 Oct 2022 05:01)  HR: 88 (31 Oct 2022 12:41) (88 - 107)  BP: 115/56 (31 Oct 2022 12:41) (115/56 - 128/73)  BP(mean): 92 (30 Oct 2022 21:30) (92 - 92)  RR: 18 (31 Oct 2022 12:41) (18 - 20)  SpO2: --        Constitutional - [ x  ] NAD, Comfortable        [   ] other:  Chest - [ x   ] CTA     [   ] other:  Cardiovascular - [  x ] RRR, no murmur     [   ] other:  Abdomen - [ x  ] Soft, NT/ND      [   ] other:        - [ x ]  No KENNY CATHETER   [   ] YES  if yes: [   ] NO MEATAL TEAR OR DISCHARGE [   ] other:  Extremities - [ x ]  No C/C/E, No calf tenderness       [ x  ] other: mild calf tenderness in BLE L worse than R, mild swelling, no erythema   ROM - [ x  ] WFL     [   ] other: chronic arthritic changes both knees. Atrophy of intrinsic muscles of hands                                     Neurologic Exam -                 Cognitive - [ x  ]Awake, Alert, AAO to self, place, date, year, situation         [    ] other:       Communication - [ x  ]Fluent, No dysarthria       [   ] other:      Motor - No focal deficits                    Right UE -  [ x  ] WNL      [    ] other:                    Left UE -     [ x  ] WNL      [    ] other:                    Right LE -   [  x ] WNL       [    ] other:                    Left LE -      [x   ]WNL        [    ] other:      Sensory - [  x ] Intact to LT, burning dysesthesias both feet    [    ] other:          Reflexes - [ x  ] wnl/ symmetric     [   ] other:     Psychiatric - [ x  ]Mood stable, Affect WNL     [   ]other:     Skin - [  x ] intact      [   ] other    MEDICATIONS  (STANDING):  acetaminophen     Tablet .. 650 milliGRAM(s) Oral every 6 hours  atorvastatin 10 milliGRAM(s) Oral at bedtime  bisacodyl 10 milliGRAM(s) Oral daily  DULoxetine 30 milliGRAM(s) Oral every 12 hours  enoxaparin Injectable 40 milliGRAM(s) SubCutaneous every 24 hours  glycerin Suppository - Adult 1 Suppository(s) Rectal once  influenza  Vaccine (HIGH DOSE) 0.7 milliLiter(s) IntraMuscular once  lactulose Syrup 10 Gram(s) Oral daily  levothyroxine 50 MICROGram(s) Oral daily  lidocaine   4% Patch 1 Patch Transdermal every 24 hours  losartan 100 milliGRAM(s) Oral daily  magnesium oxide 400 milliGRAM(s) Oral three times a day with meals  metoprolol tartrate 25 milliGRAM(s) Oral every 12 hours  NIFEdipine XL 30 milliGRAM(s) Oral daily  oxybutynin 5 milliGRAM(s) Oral every 12 hours  pantoprazole    Tablet 40 milliGRAM(s) Oral before breakfast  polyethylene glycol 3350 17 Gram(s) Oral two times a day  senna 1 Tablet(s) Oral at bedtime  sodium chloride 1 Gram(s) Oral three times a day  warfarin 4 milliGRAM(s) Oral once    MEDICATIONS  (PRN):  magnesium hydroxide Suspension 30 milliLiter(s) Oral daily PRN Constipation                            11.1   1.17  )-----------( 236      ( 31 Oct 2022 08:02 )             33.2     10-31    135  |  99  |  14  ----------------------------<  83  4.3   |  27  |  0.6<L>    Ca    9.0      31 Oct 2022 08:02  Mg     1.6     10-31    TPro  6.1  /  Alb  3.9  /  TBili  0.4  /  DBili  x   /  AST  14  /  ALT  13  /  AlkPhos  111  10-31    PT/INR - ( 31 Oct 2022 08:02 )   PT: 27.40 sec;   INR: 2.34 ratio

## 2022-10-31 NOTE — CHART NOTE - NSCHARTNOTEFT_GEN_A_CORE
Registered Dietitian Follow-Up     Patient Profile Reviewed                           Yes []   No []     Nutrition History Previously Obtained        Yes []  No []       Pertinent Subjective Information:     Pertinent Medical Interventions:     Diet order:     Anthropometrics:    IBW:     Daily   % Weight Change    MEDICATIONS  (STANDING):  acetaminophen     Tablet .. 650 milliGRAM(s) Oral every 6 hours  atorvastatin 10 milliGRAM(s) Oral at bedtime  bisacodyl 10 milliGRAM(s) Oral daily  DULoxetine 30 milliGRAM(s) Oral every 12 hours  glycerin Suppository - Adult 1 Suppository(s) Rectal once  influenza  Vaccine (HIGH DOSE) 0.7 milliLiter(s) IntraMuscular once  lactulose Syrup 10 Gram(s) Oral daily  levothyroxine 50 MICROGram(s) Oral daily  lidocaine   4% Patch 1 Patch Transdermal every 24 hours  losartan 100 milliGRAM(s) Oral daily  magnesium oxide 400 milliGRAM(s) Oral three times a day with meals  metoprolol tartrate 25 milliGRAM(s) Oral every 12 hours  NIFEdipine XL 30 milliGRAM(s) Oral daily  oxybutynin 5 milliGRAM(s) Oral every 12 hours  pantoprazole    Tablet 40 milliGRAM(s) Oral before breakfast  polyethylene glycol 3350 17 Gram(s) Oral two times a day  senna 1 Tablet(s) Oral at bedtime  sodium chloride 1 Gram(s) Oral three times a day  sodium chloride 0.65% Nasal 1 Spray(s) Both Nostrils three times a day  warfarin 4 milliGRAM(s) Oral once    MEDICATIONS  (PRN):  magnesium hydroxide Suspension 30 milliLiter(s) Oral daily PRN Constipation    Pertinent Labs: 10-31 @ 08:02: Na 135, BUN 14, Cr 0.6<L>, BG 83, K+ 4.3, Phos --, Mg 1.6<L>, Alk Phos 111, ALT/SGPT 13, AST/SGOT 14, HbA1c --    Finger Sticks:    Physical Findings:  - Appearance:  - GI function:  - Tubes:  - Oral/Mouth cavity:  - Skin:     Nutrition Requirements:  Weight Used:     Estimated Energy Needs    Continue []  Adjust []  Adjusted Energy Recommendations:   kcal/day        Estimated Protein Needs    Continue []  Adjust []  Adjusted Protein Recommendations:   gm/day        Estimated Fluid Needs        Continue []  Adjust []  Adjusted Fluid Recommendations:   mL/day     Nutrient Intake:     [] Previous Nutrition Diagnosis:            [] Ongoing          [] Resolved    [] No active nutrition diagnosis identified at this time     Nutrition Intervention      Goal/Expected Outcome:      Indicator/Monitoring:      Recommendation: Registered Dietitian Follow-Up     Patient Profile Reviewed                           Yes [x]   No []     Nutrition History Previously Obtained        Yes [x]  No []       Pertinent Subjective Information:  Patient reports good appetite and PO intake >75% of meals. Patient also reports last BM today.      Pertinent Medical Interventions:  #Rehab of decline in mobility and cognition, s/p fall c/w Metabolic Encephalopathy from Hyponatremia. She also has multiple rib fractures 7- 10 on the right with pain   #Hyponatremia  -Sodium borderline low on 10/18. Continue fluid restriction and monitor.   - Adjusted salt tabs to 1g TID. Will continue to monitor Na levels.   #Constipation, improved  - chronic problem  - senna daily, miralax BID, dulcolax daily   - lactulose daily   - suppository PRN  - had results 10/18.   - continue regimen and monitor  #Hypomagnesemia  - Continue supplementation and monitor  #Hypokalemia  - 3.5 > 3.9 > 4.3>4.3  - improved    Diet order:   Diet, Regular:   1000mL Fluid Restriction ([id number]) (10-20-22 @ 13:54) [Active]    Anthropometrics:  Height: 160 cm   Weight: 56 kg  BMI: 22.5   IBW: 52.2 kg     MEDICATIONS  (STANDING):  acetaminophen     Tablet .. 650 milliGRAM(s) Oral every 6 hours  atorvastatin 10 milliGRAM(s) Oral at bedtime  bisacodyl 10 milliGRAM(s) Oral daily  DULoxetine 30 milliGRAM(s) Oral every 12 hours  glycerin Suppository - Adult 1 Suppository(s) Rectal once  influenza  Vaccine (HIGH DOSE) 0.7 milliLiter(s) IntraMuscular once  lactulose Syrup 10 Gram(s) Oral daily  levothyroxine 50 MICROGram(s) Oral daily  lidocaine   4% Patch 1 Patch Transdermal every 24 hours  losartan 100 milliGRAM(s) Oral daily  magnesium oxide 400 milliGRAM(s) Oral three times a day with meals  metoprolol tartrate 25 milliGRAM(s) Oral every 12 hours  NIFEdipine XL 30 milliGRAM(s) Oral daily  oxybutynin 5 milliGRAM(s) Oral every 12 hours  pantoprazole    Tablet 40 milliGRAM(s) Oral before breakfast  polyethylene glycol 3350 17 Gram(s) Oral two times a day  senna 1 Tablet(s) Oral at bedtime  sodium chloride 1 Gram(s) Oral three times a day  sodium chloride 0.65% Nasal 1 Spray(s) Both Nostrils three times a day  warfarin 4 milliGRAM(s) Oral once    MEDICATIONS  (PRN):  magnesium hydroxide Suspension 30 milliLiter(s) Oral daily PRN Constipation    Pertinent Labs: 10-31 @ 08:02: Na 135, BUN 14, Cr 0.6<L>, BG 83, K+ 4.3, Mg 1.6<L>, Alk Phos 111, ALT/SGPT 13, AST/SGOT 14    Physical Findings:  - Appearance: AAO x 4   - GI function: last BM 10/31 per patient   - Tubes:  n/a   - Oral/Mouth cavity: regular texture, thin liquids  - Skin: No Pressure Injury, no edema per flowsheet     Nutrition Requirements:  Weight Used: 56 kg - estimated needs with consideration for age      Estimated Energy Needs    Continue [x]  Adjust []  974 - 1071 kcal/day (MSJ x 1.0 - 1.1 SF)      Estimated Protein Needs    Continue [x]  Adjust []  56-62 gm/day (1.0 - 1.1 gm/kg)     Estimated Fluid Needs        Continue [x]  Adjust []  1400 mL/day (25 mL/kg)     Nutrient Intake: PO intake >75% of meals      [x] No active nutrition diagnosis identified at this time     Nutrition Intervention:  coordination of care     Goal/Expected Outcome:   Patient to maintain PO intake >75% of meals within 7-10 days      Indicator/Monitoring:   RD to monitor energy intake, weight, labs (sodium, mg), skin status, NFPF      Recommendation:  1) Continue current diet order  2) Obtain new weight   3) Monitor BM    Patient is at low nutrition risk, RD to f/u in 7-10 days if patient not discharged or PRN    RD to remain available: Sandi Arevalo, KALE [phone]

## 2022-11-01 LAB
ANION GAP SERPL CALC-SCNC: 8 MMOL/L — SIGNIFICANT CHANGE UP (ref 7–14)
BUN SERPL-MCNC: 14 MG/DL — SIGNIFICANT CHANGE UP (ref 10–20)
CALCIUM SERPL-MCNC: 8.6 MG/DL — SIGNIFICANT CHANGE UP (ref 8.4–10.5)
CHLORIDE SERPL-SCNC: 99 MMOL/L — SIGNIFICANT CHANGE UP (ref 98–110)
CO2 SERPL-SCNC: 26 MMOL/L — SIGNIFICANT CHANGE UP (ref 17–32)
CREAT SERPL-MCNC: 0.6 MG/DL — LOW (ref 0.7–1.5)
EGFR: 88 ML/MIN/1.73M2 — SIGNIFICANT CHANGE UP
GLUCOSE SERPL-MCNC: 91 MG/DL — SIGNIFICANT CHANGE UP (ref 70–99)
INR BLD: 2.3 RATIO — HIGH (ref 0.65–1.3)
MAGNESIUM SERPL-MCNC: 1.8 MG/DL — SIGNIFICANT CHANGE UP (ref 1.8–2.4)
POTASSIUM SERPL-MCNC: 4.6 MMOL/L — SIGNIFICANT CHANGE UP (ref 3.5–5)
POTASSIUM SERPL-SCNC: 4.6 MMOL/L — SIGNIFICANT CHANGE UP (ref 3.5–5)
PROTHROM AB SERPL-ACNC: 26.9 SEC — HIGH (ref 9.95–12.87)
SODIUM SERPL-SCNC: 133 MMOL/L — LOW (ref 135–146)

## 2022-11-01 RX ORDER — LOSARTAN POTASSIUM 100 MG/1
1 TABLET, FILM COATED ORAL
Qty: 0 | Refills: 0 | DISCHARGE
Start: 2022-11-01

## 2022-11-01 RX ORDER — SODIUM CHLORIDE 0.65 %
1 AEROSOL, SPRAY (ML) NASAL
Qty: 0 | Refills: 0 | DISCHARGE
Start: 2022-11-01

## 2022-11-01 RX ORDER — MAGNESIUM OXIDE 400 MG ORAL TABLET 241.3 MG
1 TABLET ORAL
Qty: 0 | Refills: 0 | DISCHARGE
Start: 2022-11-01

## 2022-11-01 RX ORDER — SODIUM CHLORIDE 9 MG/ML
1 INJECTION INTRAMUSCULAR; INTRAVENOUS; SUBCUTANEOUS
Qty: 0 | Refills: 0 | DISCHARGE
Start: 2022-11-01

## 2022-11-01 RX ORDER — LEVOTHYROXINE SODIUM 125 MCG
1 TABLET ORAL
Qty: 0 | Refills: 0 | DISCHARGE

## 2022-11-01 RX ORDER — METOPROLOL TARTRATE 50 MG
0 TABLET ORAL
Qty: 0 | Refills: 0 | DISCHARGE

## 2022-11-01 RX ORDER — METOPROLOL TARTRATE 50 MG
1 TABLET ORAL
Qty: 0 | Refills: 0 | DISCHARGE
Start: 2022-11-01

## 2022-11-01 RX ORDER — ATORVASTATIN CALCIUM 80 MG/1
1 TABLET, FILM COATED ORAL
Qty: 0 | Refills: 0 | DISCHARGE
Start: 2022-11-01

## 2022-11-01 RX ORDER — DULOXETINE HYDROCHLORIDE 30 MG/1
1 CAPSULE, DELAYED RELEASE ORAL
Qty: 0 | Refills: 0 | DISCHARGE

## 2022-11-01 RX ORDER — MIRABEGRON 50 MG/1
1 TABLET, EXTENDED RELEASE ORAL
Qty: 0 | Refills: 0 | DISCHARGE

## 2022-11-01 RX ORDER — WARFARIN SODIUM 2.5 MG/1
4 TABLET ORAL ONCE
Refills: 0 | Status: COMPLETED | OUTPATIENT
Start: 2022-11-01 | End: 2022-11-01

## 2022-11-01 RX ORDER — NIFEDIPINE 30 MG
1 TABLET, EXTENDED RELEASE 24 HR ORAL
Qty: 0 | Refills: 0 | DISCHARGE
Start: 2022-11-01

## 2022-11-01 RX ORDER — ATORVASTATIN CALCIUM 80 MG/1
1 TABLET, FILM COATED ORAL
Qty: 0 | Refills: 0 | DISCHARGE

## 2022-11-01 RX ORDER — DULOXETINE HYDROCHLORIDE 30 MG/1
1 CAPSULE, DELAYED RELEASE ORAL
Qty: 0 | Refills: 0 | DISCHARGE
Start: 2022-11-01

## 2022-11-01 RX ORDER — ACETAMINOPHEN 500 MG
2 TABLET ORAL
Qty: 0 | Refills: 0 | DISCHARGE
Start: 2022-11-01

## 2022-11-01 RX ADMIN — Medication 1 SPRAY(S): at 21:29

## 2022-11-01 RX ADMIN — SODIUM CHLORIDE 1 GRAM(S): 9 INJECTION INTRAMUSCULAR; INTRAVENOUS; SUBCUTANEOUS at 12:17

## 2022-11-01 RX ADMIN — SODIUM CHLORIDE 1 GRAM(S): 9 INJECTION INTRAMUSCULAR; INTRAVENOUS; SUBCUTANEOUS at 21:29

## 2022-11-01 RX ADMIN — DULOXETINE HYDROCHLORIDE 30 MILLIGRAM(S): 30 CAPSULE, DELAYED RELEASE ORAL at 05:24

## 2022-11-01 RX ADMIN — Medication 25 MILLIGRAM(S): at 17:19

## 2022-11-01 RX ADMIN — LIDOCAINE 1 PATCH: 4 CREAM TOPICAL at 21:29

## 2022-11-01 RX ADMIN — Medication 5 MILLIGRAM(S): at 05:24

## 2022-11-01 RX ADMIN — Medication 1 SPRAY(S): at 05:25

## 2022-11-01 RX ADMIN — MAGNESIUM OXIDE 400 MG ORAL TABLET 400 MILLIGRAM(S): 241.3 TABLET ORAL at 08:18

## 2022-11-01 RX ADMIN — WARFARIN SODIUM 4 MILLIGRAM(S): 2.5 TABLET ORAL at 21:30

## 2022-11-01 RX ADMIN — Medication 50 MICROGRAM(S): at 05:24

## 2022-11-01 RX ADMIN — POLYETHYLENE GLYCOL 3350 17 GRAM(S): 17 POWDER, FOR SOLUTION ORAL at 05:23

## 2022-11-01 RX ADMIN — DULOXETINE HYDROCHLORIDE 30 MILLIGRAM(S): 30 CAPSULE, DELAYED RELEASE ORAL at 17:18

## 2022-11-01 RX ADMIN — SENNA PLUS 1 TABLET(S): 8.6 TABLET ORAL at 21:29

## 2022-11-01 RX ADMIN — Medication 30 MILLIGRAM(S): at 05:24

## 2022-11-01 RX ADMIN — Medication 650 MILLIGRAM(S): at 17:19

## 2022-11-01 RX ADMIN — SODIUM CHLORIDE 1 GRAM(S): 9 INJECTION INTRAMUSCULAR; INTRAVENOUS; SUBCUTANEOUS at 05:23

## 2022-11-01 RX ADMIN — Medication 1 SPRAY(S): at 12:16

## 2022-11-01 RX ADMIN — Medication 650 MILLIGRAM(S): at 12:17

## 2022-11-01 RX ADMIN — Medication 25 MILLIGRAM(S): at 05:24

## 2022-11-01 RX ADMIN — MAGNESIUM OXIDE 400 MG ORAL TABLET 400 MILLIGRAM(S): 241.3 TABLET ORAL at 17:19

## 2022-11-01 RX ADMIN — MAGNESIUM OXIDE 400 MG ORAL TABLET 400 MILLIGRAM(S): 241.3 TABLET ORAL at 12:17

## 2022-11-01 RX ADMIN — PANTOPRAZOLE SODIUM 40 MILLIGRAM(S): 20 TABLET, DELAYED RELEASE ORAL at 05:23

## 2022-11-01 RX ADMIN — ATORVASTATIN CALCIUM 10 MILLIGRAM(S): 80 TABLET, FILM COATED ORAL at 21:28

## 2022-11-01 RX ADMIN — Medication 5 MILLIGRAM(S): at 17:19

## 2022-11-01 RX ADMIN — Medication 650 MILLIGRAM(S): at 13:00

## 2022-11-01 RX ADMIN — Medication 650 MILLIGRAM(S): at 05:24

## 2022-11-01 RX ADMIN — Medication 650 MILLIGRAM(S): at 18:00

## 2022-11-01 RX ADMIN — LOSARTAN POTASSIUM 100 MILLIGRAM(S): 100 TABLET, FILM COATED ORAL at 05:24

## 2022-11-01 NOTE — PROGRESS NOTE ADULT - ASSESSMENT
ASSESSMENT/PLAN    #Rehab of decline in mobility and cognition, s/p fall c/w Metabolic Encephalopathy from Hyponatremia. She also has multiple rib fractures 7- 10 on the right with pain   She notes confusion and weakness prior to her fall and neuro w/u was c/w vascular calcification (not SAH), and moderate generalized microvascular ischemia. She also has severe OA of both knees, further limiting her mobility and Peripheral Neuropathy of unclear etiology. She requires close medical supervision because of her hyponatremia currently being treated with fluid restriction and Sodium tablets. She requires and can tolerate 3 hrs a day of intensive interdisciplinary rehab including PT, OT and ST and Neuoropsych eval.   Improving    #Hyponatremia  - improving since admission, s/p IV saline, on fluid restriction and NaCl tabs.  - was on Trileptal, HCTZ on admission, stopped  - followed by Renal Medicine;  - monitor daily  - 127>125>134 > 137 >133>135>133  - Sodium borderline low on 10/18. Continue fluid restriction and monitor.   - Continue salt tabs 1g TID. Will continue to monitor Na levels.     # BLE calf pain  - tender to touch, left worse than right, no cord, no swelling, no discoloration  - on coumadin already for afib    #constipation, improved  - chronic problem  - senna daily, miralax BID, dulcolax daily   - lactulose daily   - suppository PRN  - had results 10/18.   - continue regimen and monitor    #Hypomagnesemia  - Continue supplementation and monitor    #Hypokalemia  - 3.5 > 3.9 > 4.3>4.3  - improved    #Peripheral Neuropathy  - dysesthesias of feet and atrophy of intrinsic muscles of hands  - unknown etiology  - was on Trileptal at home and now on Cymbalta    #History of Chronic Atrial Fibrillation  - rate controlled. Currently RRR  - Coumadin held prior to admission to rehab secondary to suspicion of SAH, now ruled out  - Resume coumadin home dose 5 mg daily and adjust for INR 2-3  - adjust coumadin according to daily INR  - INR 2.30, Will give Warfarin 4 today and continue to monitor    #History of Myelodysplasia  - stable pancytopenia. Will continue to monitor   - followed by Heme/Onc.   - On Vidaza every 28 days     #Osteoarthritis  - pain meds, modalities, ther-ex    #Chronic Microvascular Cerebrovascular Disease  - monitor    #HTN  - controlled on current meds    #HLD  - on statin    #[place]  - [employer]  - consider outpatient Audiology eval    #Hypothyroidism  - on synthroid    #Pain control:   - rib fracture  - neuropathy pain  - Tylenol prn, Cymbalta    #GI/Bowel Mgmt: Contipation in hospital  - continue bowel meds and monitor    - Diet: Regular with salt tabs         Precautions / PROPHYLAXIS:      - Falls    - Ortho: Weight bearing status: WBAT      - DVT prophylaxis: coumadin (d/c Lovenox when INR therapeutic)     ASSESSMENT/PLAN    #Rehab of decline in mobility and cognition, s/p fall c/w Metabolic Encephalopathy from Hyponatremia. She also has multiple rib fractures 7- 10 on the right with pain   She notes confusion and weakness prior to her fall and neuro w/u was c/w vascular calcification (not SAH), and moderate generalized microvascular ischemia. She also has severe OA of both knees, further limiting her mobility and Peripheral Neuropathy of unclear etiology. She requires close medical supervision because of her hyponatremia currently being treated with fluid restriction and Sodium tablets. She requires and can tolerate 3 hrs a day of intensive interdisciplinary rehab including PT, OT and ST and Neuoropsych eval.   Improving    #Hyponatremia  - improving since admission, s/p IV saline, on fluid restriction and NaCl tabs.  - was on Trileptal, HCTZ on admission, stopped  - followed by Renal Medicine;  - monitor daily  - 127>125>134 > 137 >133>135>133  - Sodium borderline low on 10/18. Continue fluid restriction and monitor.   - Continue salt tabs 1g TID. Will continue to monitor Na levels.     # BLE calf pain  - tender to touch, left worse than right, no cord, no swelling, no discoloration  - on coumadin already for afib    #constipation, improved  - chronic problem  - senna daily, miralax BID, dulcolax daily   - lactulose daily   - suppository PRN  - had results 10/18.   - continue regimen and monitor    #Hypomagnesemia  - Continue supplementation and monitor    #Hypokalemia  - 3.5 > 3.9 > 4.3>4.3  - improved    #Peripheral Neuropathy  - dysesthesias of feet and atrophy of intrinsic muscles of hands  - unknown etiology  - was on Trileptal at home and now on Cymbalta    #History of Chronic Atrial Fibrillation  - rate controlled. Currently RRR  - Coumadin held prior to admission to rehab secondary to suspicion of SAH, now ruled out  - Resume coumadin home dose 5 mg daily and adjust for INR 2-3  - adjust coumadin according to daily INR  - INR 2.30, Will give Warfarin 4 today and continue to monitor    #History of Myelodysplasia  - stable pancytopenia. Will continue to monitor   - followed by Heme/Onc.   - On Vidaza every 28 days     #Osteoarthritis  - pain meds, modalities, ther-ex    #Chronic Microvascular Cerebrovascular Disease  - monitor    #HTN  - controlled on current meds    #HLD  - on statin    #[place]  - [employer]  - consider outpatient Audiology eval    #Hypothyroidism  - on synthroid    #Pain control:   - rib fracture  - neuropathy pain  - Tylenol prn, Cymbalta    #GI/Bowel Mgmt: Contipation in hospital  - continue bowel meds and monitor    - Diet: Regular with salt tabs         Precautions / PROPHYLAXIS:      - Falls    - Ortho: Weight bearing status: WBAT      - DVT prophylaxis: coumadin

## 2022-11-01 NOTE — PROGRESS NOTE ADULT - ASSESSMENT
Pain:  Yes Location: Knees     Ratin/10       Intervention: RN Notified      Orientation: WFL      Arousal Level: Alert      Behavior: Pleasant/Cooperative       Affect Range: WFL       Attention: WFL     Insight into illness/deficits: Good     Neuropsychology Assessment:     Treatment Focused in Cognitive Remediation      Phonemic Fluency: 14 words/ 60 seconds     Animals: 5 words/ 60 seconds     [employer] Items: 6 words/60 seconds      Category Switching: 10 words/60 seconds      Executive Function: WNL     Category Switching Accuracy: 10 switches/ 60 seconds          Session Summary: During today’s session, the patient performed a cognitive remediation therapy for recovery of cognitive function. Mrs. Murray, evidenced fluctuations compared to her previous session. She provided less words on today's session suggesting retrieval and word-finding impairments ?However, she appears to benefit from using visual and cueing strategies to help her with word-finding difficulties.        Recommendations:    Patient will benefit from speech therapy at outpatient services will order referral for discharge     Support positive coping and cognitive assessment?     Patient is scheduled for discharge on .           Goal: Feedback of results to MD, Treatment Team, Patient and Family??     Plan: Family contact/support/education??     Length of Current Session: Patient Contact:  30 minutes??

## 2022-11-01 NOTE — PROGRESS NOTE ADULT - SUBJECTIVE AND OBJECTIVE BOX
Patient is a 87y old  Female who presents with a chief complaint of Metabolic Encephalopathy with decline in function (21 Oct 2022 11:53)      HPI:  Patient is a 88 y/o female with PMHx of MDS ( on Vidaza- gets every 28 days- just finished course- due in around 3 weeks) HTN, HLD, A-Fib ( On Coumadin 5mg daily), hypothyroid, neuropathy of B/L LE, Trigeminal Neuralgia ( Prior Gamma knife procedure for pain), B/L OA of the knees, prior CCY who presented to [facility] s/p fall at home. Patient was ambulating at home with the walker when she fell onto the right side. She doesn't have the best recollection of the event but denies LOC. She has a home health aide and her grandson was also home at the time. She was able to stand and walk but presented as notes ongoing dizziness and pain over her right chest. Pain over the right side is dull, worse with motion, but not sharp. She notes dizziness notable after fall but not prior. With the dizziness she gets occasional nausea. Prior to fall she denies any change in medications, new medications, recent illness, nor any other features out different from her baseline .  Patient noted on workup to have right sided fracture of the 7-10th right ribs without displacement. On Incentive she can do around 500 but without respiratory distress or hemodynamic instability. She was also noted on CT to have a small right sub arachnoid hemorrhage. Admitted to surgery and will have a SICU consult with possible SICU admission due to trauma burden. She was also noted to have a sodium of 120 and was on HCTZ. She has had a slow, improvement in her Na+ to 126 on fluid restriction and Na+ tabs. Neuro w/u revealed her SAH to be secondary to vascular calcification with a normal MRA and MRV and no further w/u recommended.     She is medically stable, but states that she has been confused and weak since about 1 week PTA. She requires CG/ min assist for transfers and CG for ambulation with a RW 40 feet only secondary to impaired endurance and mod assistance for LE dressing.   PTA, she live with her daughter and had a HHA 8 hrs. a day, 5 days a week. She reports being able to transfer and ambulate with a RW with supervision PTA and being able to dress herself. She was evaluated by me on the Trauma Service and was found to be a good acute inpatient rehab candidate.  (20 Oct 2022 13:56)    TODAY'S SUBJECTIVE & REVIEW OF SYMPTOMS:    Patient seen and examined bedside in the AM. No acute overnight events.   INR 2.3 this morning. Na 133, currently on 1g TID salt tablets.    Denies any chest pain, nausea, vomiting, abdominal pain, or headache.   Voiding bladder and bowels spontaneously. Participating and tolerating PT/OT.     CLOF: bed mobility Supervision, transfers Supervision , Ambulating 150' RW TA      Constitutional    [ x  ] WNL        [   ] poor appetite   [   ] insomnia   [   ] tired      ENT:                                             [ x ]  , [place]      Cardio:                [ x  ] WNL           [   ] CP   [   ] BROWN   [   ] palpitations               Resp:                   [  x ] WNL           [   ] SOB   [   ] cough   [   ] wheezing   GI:                        [   ] WNL           [ x  ] constipation   [   ] diarrhea   [   ] abdominal pain   [   ] nausea   [   ] emesis                                :                      [x   ] WNL           [   ] KENNY  [   ] dysuria   [   ] difficulty voiding             Endo:                   [ x  ] WNL          [   ] polyuria   [   ] temperature intolerance                 Skin:                     [ x  ] WNL          [   ] pain   [   ] wound   [   ] rash   MSK:                    [   ] WNL          [   ] muscle pain   [ x  ] joint pain/ stiffness both knees   [   ] muscle tenderness   [   ] swelling   Neuro:                 [   ] WNL          [   ] HA   [   ] change in vision   [   ] tremor   [x   ] weakness   [   ]dysphagia              Cognitive:           [  x ] WNL           [   ]confusion      Psych:                  [   ] WNL           [   ] hallucinations   [   ]agitation   [   ] delusion   [   ]depression [ x] anxiety      PHYSICAL EXAM  Vital Signs Last 24 Hrs  T(C): 36 (01 Nov 2022 11:12), Max: 36.3 (01 Nov 2022 06:11)  T(F): 96.8 (01 Nov 2022 11:12), Max: 97.4 (01 Nov 2022 06:11)  HR: 78 (01 Nov 2022 11:12) (78 - 99)  BP: 126/60 (01 Nov 2022 11:12) (122/54 - 141/75)  BP(mean): 96 (01 Nov 2022 06:11) (96 - 96)  RR: 18 (01 Nov 2022 11:12) (17 - 18)  SpO2: 98% (01 Nov 2022 06:11) (98% - 98%)    Parameters below as of 01 Nov 2022 06:11  Patient On (Oxygen Delivery Method): room air            Constitutional - [ x  ] NAD, Comfortable        [   ] other:  Chest - [ x   ] CTA     [   ] other:  Cardiovascular - [  x ] RRR, no murmur     [   ] other:  Abdomen - [ x  ] Soft, NT/ND      [   ] other:        - [ x ]  No KENNY CATHETER   [   ] YES  if yes: [   ] NO MEATAL TEAR OR DISCHARGE [   ] other:  Extremities - [ x ]  No C/C/E, No calf tenderness       [ x  ] other: mild calf tenderness in BLE L worse than R, mild swelling, no erythema   ROM - [ x  ] WFL     [   ] other: chronic arthritic changes both knees. Atrophy of intrinsic muscles of hands                                     Neurologic Exam -                 Cognitive - [ x  ]Awake, Alert, AAO to self, place, date, year, situation         [    ] other:       Communication - [ x  ]Fluent, No dysarthria       [   ] other:      Motor - No focal deficits                    Right UE -  [ x  ] WNL      [    ] other:                    Left UE -     [ x  ] WNL      [    ] other:                    Right LE -   [  x ] WNL       [    ] other:                    Left LE -      [x   ]WNL        [    ] other:      Sensory - [  x ] Intact to LT, burning dysesthesias both feet    [    ] other:          Reflexes - [ x  ] wnl/ symmetric     [   ] other:     Psychiatric - [ x  ]Mood stable, Affect WNL     [   ]other:     Skin - [  x ] intact      [   ] other        MEDICATIONS  (STANDING):  acetaminophen     Tablet .. 650 milliGRAM(s) Oral every 6 hours  atorvastatin 10 milliGRAM(s) Oral at bedtime  bisacodyl 10 milliGRAM(s) Oral daily  DULoxetine 30 milliGRAM(s) Oral every 12 hours  glycerin Suppository - Adult 1 Suppository(s) Rectal once  influenza  Vaccine (HIGH DOSE) 0.7 milliLiter(s) IntraMuscular once  lactulose Syrup 10 Gram(s) Oral daily  levothyroxine 50 MICROGram(s) Oral daily  lidocaine   4% Patch 1 Patch Transdermal every 24 hours  losartan 100 milliGRAM(s) Oral daily  magnesium oxide 400 milliGRAM(s) Oral three times a day with meals  metoprolol tartrate 25 milliGRAM(s) Oral every 12 hours  NIFEdipine XL 30 milliGRAM(s) Oral daily  oxybutynin 5 milliGRAM(s) Oral every 12 hours  pantoprazole    Tablet 40 milliGRAM(s) Oral before breakfast  polyethylene glycol 3350 17 Gram(s) Oral two times a day  senna 1 Tablet(s) Oral at bedtime  sodium chloride 1 Gram(s) Oral three times a day  sodium chloride 0.65% Nasal 1 Spray(s) Both Nostrils three times a day  warfarin 4 milliGRAM(s) Oral once    MEDICATIONS  (PRN):  magnesium hydroxide Suspension 30 milliLiter(s) Oral daily PRN Constipation                            11.1   1.17  )-----------( 236      ( 31 Oct 2022 08:02 )             33.2     11-01    133<L>  |  99  |  14  ----------------------------<  91  4.6   |  26  |  0.6<L>    Ca    8.6      01 Nov 2022 05:42  Mg     1.8     11-01    TPro  6.1  /  Alb  3.9  /  TBili  0.4  /  DBili  x   /  AST  14  /  ALT  13  /  AlkPhos  111  10-31    PT/INR - ( 01 Nov 2022 05:42 )   PT: 26.90 sec;   INR: 2.30 ratio                                Patient is a 87y old  Female who presents with a chief complaint of Metabolic Encephalopathy with decline in function (21 Oct 2022 11:53)      HPI:  Patient is a 86 y/o female with PMHx of MDS ( on Vidaza- gets every 28 days- just finished course- due in around 3 weeks) HTN, HLD, A-Fib ( On Coumadin 5mg daily), hypothyroid, neuropathy of B/L LE, Trigeminal Neuralgia ( Prior Gamma knife procedure for pain), B/L OA of the knees, prior CCY who presented to [facility] s/p fall at home. Patient was ambulating at home with the walker when she fell onto the right side. She doesn't have the best recollection of the event but denies LOC. She has a home health aide and her grandson was also home at the time. She was able to stand and walk but presented as notes ongoing dizziness and pain over her right chest. Pain over the right side is dull, worse with motion, but not sharp. She notes dizziness notable after fall but not prior. With the dizziness she gets occasional nausea. Prior to fall she denies any change in medications, new medications, recent illness, nor any other features out different from her baseline .  Patient noted on workup to have right sided fracture of the 7-10th right ribs without displacement. On Incentive she can do around 500 but without respiratory distress or hemodynamic instability. She was also noted on CT to have a small right sub arachnoid hemorrhage. Admitted to surgery and will have a SICU consult with possible SICU admission due to trauma burden. She was also noted to have a sodium of 120 and was on HCTZ. She has had a slow, improvement in her Na+ to 126 on fluid restriction and Na+ tabs. Neuro w/u revealed her SAH to be secondary to vascular calcification with a normal MRA and MRV and no further w/u recommended.     She is medically stable, but states that she has been confused and weak since about 1 week PTA. She requires CG/ min assist for transfers and CG for ambulation with a RW 40 feet only secondary to impaired endurance and mod assistance for LE dressing.   PTA, she live with her daughter and had a HHA 8 hrs. a day, 5 days a week. She reports being able to transfer and ambulate with a RW with supervision PTA and being able to dress herself. She was evaluated by me on the Trauma Service and was found to be a good acute inpatient rehab candidate.  (20 Oct 2022 13:56)    TODAY'S SUBJECTIVE & REVIEW OF SYMPTOMS:    Patient seen and examined bedside in the AM. No acute overnight events.   INR 2.3 this morning. Na 133, currently on 1g TID salt tablets.    Denies any chest pain, nausea, vomiting, abdominal pain, or headache.   Voiding bladder and bowels spontaneously. Participating and tolerating PT/OT.     CLOF: bed mobility Supervision, transfers Supervision , Ambulating 150' RW TA      Constitutional    [ x  ] WNL        [   ] poor appetite   [   ] insomnia   [   ] tired      ENT:                                             [ x ]  , [place]      Cardio:                [ x  ] WNL           [   ] CP   [   ] BROWN   [   ] palpitations               Resp:                   [  x ] WNL           [   ] SOB   [   ] cough   [   ] wheezing   GI:                        [   ] WNL           [ x  ] constipation   [   ] diarrhea   [   ] abdominal pain   [   ] nausea   [   ] emesis                                :                      [x   ] WNL           [   ] KENNY  [   ] dysuria   [   ] difficulty voiding             Endo:                   [ x  ] WNL          [   ] polyuria   [   ] temperature intolerance                 Skin:                     [ x  ] WNL          [   ] pain   [   ] wound   [   ] rash   MSK:                    [   ] WNL          [   ] muscle pain   [ x  ] joint pain/ stiffness both knees   [   ] muscle tenderness   [   ] swelling   Neuro:                 [   ] WNL          [   ] HA   [   ] change in vision   [   ] tremor   [x   ] weakness   [   ]dysphagia              Cognitive:           [  x ] WNL           [   ]confusion      Psych:                  [   ] WNL           [   ] hallucinations   [   ]agitation   [   ] delusion   [   ]depression [ x] anxiety      PHYSICAL EXAM  Vital Signs Last 24 Hrs  T(C): 36 (01 Nov 2022 11:12), Max: 36.3 (01 Nov 2022 06:11)  T(F): 96.8 (01 Nov 2022 11:12), Max: 97.4 (01 Nov 2022 06:11)  HR: 78 (01 Nov 2022 11:12) (78 - 99)  BP: 126/60 (01 Nov 2022 11:12) (122/54 - 141/75)  BP(mean): 96 (01 Nov 2022 06:11) (96 - 96)  RR: 18 (01 Nov 2022 11:12) (17 - 18)  SpO2: 98% (01 Nov 2022 06:11) (98% - 98%)    Parameters below as of 01 Nov 2022 06:11  Patient On (Oxygen Delivery Method): room air      Constitutional - [ x  ] NAD, Comfortable        [   ] other:  Chest - [ x   ] CTA     [   ] other:  Cardiovascular - [  x ] RRR, no murmur     [   ] other:  Abdomen - [ x  ] Soft, NT/ND      [   ] other:        - [ x ]  No KENNY CATHETER   [   ] YES  if yes: [   ] NO MEATAL TEAR OR DISCHARGE [   ] other:  Extremities - [ x ]  No C/C/E, No calf tenderness       [ x  ] other: mild calf tenderness in BLE L worse than R, mild swelling, no erythema   ROM - [ x  ] WFL     [   ] other: chronic arthritic changes both knees. Atrophy of intrinsic muscles of hands                                     Neurologic Exam -                 Cognitive - [ x  ]Awake, Alert, AAO to self, place, date, year, situation         [    ] other:       Communication - [ x  ]Fluent, No dysarthria       [   ] other:      Motor - No focal deficits                    Right UE -  [ x  ] WNL      [    ] other:                    Left UE -     [ x  ] WNL      [    ] other:                    Right LE -   [  x ] WNL       [    ] other:                    Left LE -      [x   ]WNL        [    ] other:      Sensory - [  x ] Intact to LT, burning dysesthesias both feet    [    ] other:          Reflexes - [ x  ] wnl/ symmetric     [   ] other:     Psychiatric - [ x  ]Mood stable, Affect WNL     [   ]other:     Skin - [  x ] intact      [   ] other        MEDICATIONS  (STANDING):  acetaminophen     Tablet .. 650 milliGRAM(s) Oral every 6 hours  atorvastatin 10 milliGRAM(s) Oral at bedtime  bisacodyl 10 milliGRAM(s) Oral daily  DULoxetine 30 milliGRAM(s) Oral every 12 hours  glycerin Suppository - Adult 1 Suppository(s) Rectal once  influenza  Vaccine (HIGH DOSE) 0.7 milliLiter(s) IntraMuscular once  lactulose Syrup 10 Gram(s) Oral daily  levothyroxine 50 MICROGram(s) Oral daily  lidocaine   4% Patch 1 Patch Transdermal every 24 hours  losartan 100 milliGRAM(s) Oral daily  magnesium oxide 400 milliGRAM(s) Oral three times a day with meals  metoprolol tartrate 25 milliGRAM(s) Oral every 12 hours  NIFEdipine XL 30 milliGRAM(s) Oral daily  oxybutynin 5 milliGRAM(s) Oral every 12 hours  pantoprazole    Tablet 40 milliGRAM(s) Oral before breakfast  polyethylene glycol 3350 17 Gram(s) Oral two times a day  senna 1 Tablet(s) Oral at bedtime  sodium chloride 1 Gram(s) Oral three times a day  sodium chloride 0.65% Nasal 1 Spray(s) Both Nostrils three times a day  warfarin 4 milliGRAM(s) Oral once    MEDICATIONS  (PRN):  magnesium hydroxide Suspension 30 milliLiter(s) Oral daily PRN Constipation                            11.1   1.17  )-----------( 236      ( 31 Oct 2022 08:02 )             33.2     11-01    133<L>  |  99  |  14  ----------------------------<  91  4.6   |  26  |  0.6<L>    Ca    8.6      01 Nov 2022 05:42  Mg     1.8     11-01    TPro  6.1  /  Alb  3.9  /  TBili  0.4  /  DBili  x   /  AST  14  /  ALT  13  /  AlkPhos  111  10-31    PT/INR - ( 01 Nov 2022 05:42 )   PT: 26.90 sec;   INR: 2.30 ratio

## 2022-11-01 NOTE — PROGRESS NOTE ADULT - TIME BILLING
patient contact and cognitive therapy
patient contact and family contact
patient contact, cognitive assessment and psych therapy

## 2022-11-02 ENCOUNTER — TRANSCRIPTION ENCOUNTER (OUTPATIENT)
Age: 87
End: 2022-11-02

## 2022-11-02 VITALS — DIASTOLIC BLOOD PRESSURE: 83 MMHG | HEART RATE: 94 BPM | SYSTOLIC BLOOD PRESSURE: 124 MMHG | RESPIRATION RATE: 20 BRPM

## 2022-11-02 LAB
ANION GAP SERPL CALC-SCNC: 5 MMOL/L — LOW (ref 7–14)
BUN SERPL-MCNC: 13 MG/DL — SIGNIFICANT CHANGE UP (ref 10–20)
CALCIUM SERPL-MCNC: 8.7 MG/DL — SIGNIFICANT CHANGE UP (ref 8.4–10.5)
CHLORIDE SERPL-SCNC: 97 MMOL/L — LOW (ref 98–110)
CO2 SERPL-SCNC: 29 MMOL/L — SIGNIFICANT CHANGE UP (ref 17–32)
CREAT SERPL-MCNC: 0.5 MG/DL — LOW (ref 0.7–1.5)
EGFR: 92 ML/MIN/1.73M2 — SIGNIFICANT CHANGE UP
GLUCOSE SERPL-MCNC: 95 MG/DL — SIGNIFICANT CHANGE UP (ref 70–99)
INR BLD: 2.15 RATIO — HIGH (ref 0.65–1.3)
MAGNESIUM SERPL-MCNC: 1.7 MG/DL — LOW (ref 1.8–2.4)
POTASSIUM SERPL-MCNC: 4.2 MMOL/L — SIGNIFICANT CHANGE UP (ref 3.5–5)
POTASSIUM SERPL-SCNC: 4.2 MMOL/L — SIGNIFICANT CHANGE UP (ref 3.5–5)
PROTHROM AB SERPL-ACNC: 25.1 SEC — HIGH (ref 9.95–12.87)
SODIUM SERPL-SCNC: 131 MMOL/L — LOW (ref 135–146)

## 2022-11-02 RX ORDER — WARFARIN SODIUM 2.5 MG/1
5 TABLET ORAL ONCE
Refills: 0 | Status: DISCONTINUED | OUTPATIENT
Start: 2022-11-02 | End: 2022-11-02

## 2022-11-02 RX ORDER — SODIUM CHLORIDE 9 MG/ML
2 INJECTION INTRAMUSCULAR; INTRAVENOUS; SUBCUTANEOUS
Qty: 180 | Refills: 0
Start: 2022-11-02 | End: 2022-12-01

## 2022-11-02 RX ORDER — SODIUM CHLORIDE 9 MG/ML
2 INJECTION INTRAMUSCULAR; INTRAVENOUS; SUBCUTANEOUS THREE TIMES A DAY
Refills: 0 | Status: DISCONTINUED | OUTPATIENT
Start: 2022-11-02 | End: 2022-11-02

## 2022-11-02 RX ORDER — WARFARIN SODIUM 2.5 MG/1
4 TABLET ORAL ONCE
Refills: 0 | Status: DISCONTINUED | OUTPATIENT
Start: 2022-11-02 | End: 2022-11-02

## 2022-11-02 RX ORDER — ATORVASTATIN CALCIUM 80 MG/1
1 TABLET, FILM COATED ORAL
Qty: 30 | Refills: 0
Start: 2022-11-02 | End: 2022-12-01

## 2022-11-02 RX ORDER — ATORVASTATIN CALCIUM 80 MG/1
1 TABLET, FILM COATED ORAL
Qty: 0 | Refills: 0 | DISCHARGE
Start: 2022-11-02

## 2022-11-02 RX ORDER — METOPROLOL TARTRATE 50 MG
1 TABLET ORAL
Qty: 60 | Refills: 0
Start: 2022-11-02 | End: 2022-12-01

## 2022-11-02 RX ORDER — METOPROLOL TARTRATE 50 MG
1 TABLET ORAL
Qty: 0 | Refills: 0 | DISCHARGE
Start: 2022-11-02

## 2022-11-02 RX ORDER — NIFEDIPINE 30 MG
1 TABLET, EXTENDED RELEASE 24 HR ORAL
Qty: 30 | Refills: 0
Start: 2022-11-02 | End: 2022-12-01

## 2022-11-02 RX ORDER — DULOXETINE HYDROCHLORIDE 30 MG/1
1 CAPSULE, DELAYED RELEASE ORAL
Qty: 0 | Refills: 0 | DISCHARGE
Start: 2022-11-02

## 2022-11-02 RX ORDER — DULOXETINE HYDROCHLORIDE 30 MG/1
1 CAPSULE, DELAYED RELEASE ORAL
Qty: 60 | Refills: 0
Start: 2022-11-02 | End: 2022-12-01

## 2022-11-02 RX ORDER — LOSARTAN POTASSIUM 100 MG/1
1 TABLET, FILM COATED ORAL
Qty: 30 | Refills: 0
Start: 2022-11-02 | End: 2022-12-01

## 2022-11-02 RX ORDER — MENTHOL AND CAPSAICIN .0375; 5 G/100G; G/100G
1 PATCH TOPICAL
Qty: 0 | Refills: 0 | DISCHARGE

## 2022-11-02 RX ADMIN — DULOXETINE HYDROCHLORIDE 30 MILLIGRAM(S): 30 CAPSULE, DELAYED RELEASE ORAL at 06:50

## 2022-11-02 RX ADMIN — MAGNESIUM OXIDE 400 MG ORAL TABLET 400 MILLIGRAM(S): 241.3 TABLET ORAL at 12:07

## 2022-11-02 RX ADMIN — Medication 650 MILLIGRAM(S): at 02:30

## 2022-11-02 RX ADMIN — Medication 1 SPRAY(S): at 12:07

## 2022-11-02 RX ADMIN — SODIUM CHLORIDE 2 GRAM(S): 9 INJECTION INTRAMUSCULAR; INTRAVENOUS; SUBCUTANEOUS at 12:08

## 2022-11-02 RX ADMIN — Medication 5 MILLIGRAM(S): at 06:50

## 2022-11-02 RX ADMIN — SODIUM CHLORIDE 1 GRAM(S): 9 INJECTION INTRAMUSCULAR; INTRAVENOUS; SUBCUTANEOUS at 06:51

## 2022-11-02 RX ADMIN — Medication 30 MILLIGRAM(S): at 06:50

## 2022-11-02 RX ADMIN — Medication 650 MILLIGRAM(S): at 12:07

## 2022-11-02 RX ADMIN — Medication 50 MICROGRAM(S): at 06:50

## 2022-11-02 RX ADMIN — LOSARTAN POTASSIUM 100 MILLIGRAM(S): 100 TABLET, FILM COATED ORAL at 06:50

## 2022-11-02 RX ADMIN — Medication 650 MILLIGRAM(S): at 13:00

## 2022-11-02 RX ADMIN — Medication 1 SPRAY(S): at 06:51

## 2022-11-02 RX ADMIN — PANTOPRAZOLE SODIUM 40 MILLIGRAM(S): 20 TABLET, DELAYED RELEASE ORAL at 06:51

## 2022-11-02 RX ADMIN — Medication 650 MILLIGRAM(S): at 06:50

## 2022-11-02 RX ADMIN — MAGNESIUM OXIDE 400 MG ORAL TABLET 400 MILLIGRAM(S): 241.3 TABLET ORAL at 07:57

## 2022-11-02 RX ADMIN — Medication 25 MILLIGRAM(S): at 06:50

## 2022-11-02 RX ADMIN — Medication 650 MILLIGRAM(S): at 00:40

## 2022-11-02 NOTE — PROGRESS NOTE ADULT - PROVIDER SPECIALTY LIST ADULT
Rehab Medicine
Nephrology
Neuropsychology
Neuropsychology
Physiatry
Rehab Medicine
Neuropsychology
Neuropsychology
Physiatry
Rehab Medicine
Neuropsychology
Rehab Medicine

## 2022-11-02 NOTE — PROGRESS NOTE ADULT - ATTENDING COMMENTS
I reviewed the chart and examined the patient with the resident and we discussed the findings and treatment plan.  The patient is tolerating the rehab program well. I agree with the findings and treatment plan above, which I modified as indicated. Patient medically stable. Na+ fell to 131 on NaCl 1 mg tid. Patient will f/u with DR. Mayes in 1 week. INR 2.1, discharge on Coumadin 5 mg daily and f/u with [facility] in a few days. Ambulates with RW and supervision/ CG. D/C home with [facility] home care therapy. Physiatry f/u prn.    I read, edited and agree with the above Assessment.
I reviewed the chart and examined the patient with the resident and we discussed the findings and treatment plan.  The patient is tolerating the rehab program well. I agree with the findings and treatment plan above, which I modified as indicated. The patient requires 3 hrs a day of acute inpatient rehab. Complaining of an foggy feeling in her head. No other symptoms, no change in vision or dizziness or nausea. Participating in therapies and ambulates with RW an TA. Tender over frontal sinuses. Labs noted. Na+ WNL. INR therapeutic. Continue Coumadin 4 mg. Decrease NaCl tabs to 1 mg tid. Start NS nasal spray for possible nasal/ sinus irritation. Continue full rehab program.    I read, edited and agree with the Assessment:  #Rehab of decline in mobility and cognition, s/p fall c/w Metabolic Encephalopathy from Hyponatremia. She also has multiple rib fractures 7- 10 on the right with pain   She notes confusion and weakness prior to her fall and neuro w/u was c/w vascular calcification (not SAH), and moderate generalized microvascular ischemia. She also has severe OA of both knees, further limiting her mobility and Peripheral Neuropathy of unclear etiology. She requires close medical supervision because of her hyponatremia currently being treated with fluid restriction and Sodium tablets. She requires and can tolerate 3 hrs a day of intensive interdisciplinary rehab including PT, OT and ST and Neuoropsych eval.   Improving    #Hyponatremia  - improving since admission, s/p IV saline, on fluid restriction and NaCl tabs.  - was on Trileptal, HCTZ on admission, stopped  - followed by Renal Medicine;  - monitor daily  - 127>125>134 > 137 >133>135  - Sodium borderline low on 10/18. Continue fluid restriction and monitor.   - Adjusted salt tabs to 1g TID. Will continue to monitor Na levels.     # BLE calf pain  - tender to touch, left worse than right, no cord, no swelling, no discoloration  - on coumadin already for afib    #constipation, improved  - chronic problem  - senna daily, miralax BID, dulcolax daily   - lactulose daily   - suppository PRN  - had results 10/18.   - continue regimen and monitor    #Hypomagnesemia  - Continue supplementation and monitor    #Hypokalemia  - 3.5 > 3.9 > 4.3>4.3  - improved    #Peripheral Neuropathy  - dysesthesias of feet and atrophy of intrinsic muscles of hands  - unknown etiology  - was on Trileptal at home and now on Cymbalta    #History of Chronic Atrial Fibrillation  - rate controlled. Currently RRR  - Coumadin held prior to admission to rehab secondary to suspicion of SAH, now ruled out  - Resume coumadin home dose 5 mg daily and adjust for INR 2-3  - adjust coumadin according to daily INR  - INR 2.34, Gave Warfarin 4 today. Will continue to monitor    #History of Myelodysplasia  - stable pancytopenia  - followed by Heme/Onc.   - On Vidaza every 28 days  - monitor     #Osteoarthritis  - pain meds, modalities, ther-ex    #Chronic Microvascular Cerebrovascular Disease  - monitor    #HTN  - controlled on current meds    #HLD  - on statin    #[place]  - P[employer]  - consider outpatient Audiology eval    #Hypothyroidism  - on synthroid
I reviewed the chart and examined the patient with the resident and we discussed the findings and treatment plan.  The patient is tolerating the rehab program well. I agree with the findings and treatment plan above, which I modified as indicated. The patient requires 3 hrs a day of acute inpatient rehab. No new c/o. VSS. Na+ improved. Monitor daily for now. To get suppository for constipation. Continue full rehab program.     I read, edited and agree with the Assessment:  #Rehab of decline in mobility and cognition, s/p fall c/w Metabolic Encephalopathy from Hyponatremia. She also has multiple rib fractures 7- 10 on the right with pain.   She notes confusion and weakness prior to her fall and neuro w/u was c/w vascular calcification (not SAH), and moderate generalized microvascular ischemia. She also has severe OA of both knees, further limiting her mobility and Peripheral Neuropathy of unclear etiology. She requires close medical supervision because of her on going severe hyponatremia currently being treated with fluid restriction and Sodium tablets. She requires and can tolerate 3 hrs a day of intensive interdisciplinary rehab including PT, OT and ST and Neuoropsych eval.     #Hyponatremia  - improving since admission, s/p IV saline, on fluid restriction and NaCl tabs 3 gms. tid.   - was on HCTZ on admission, stopped  - followed by Renal Medicine  - monitor daily  - 127>125>134    #Peripheral Neuropathy  - dysesthesias of feet and atrophy of intrinsic muscles of hands  - unknown etiology  - was on Trileptal at home and now on Cymbalta    #History of Chronic Atrial Fibrillation  - rate controlled. Currently RRR  - Coumadin held prior to admission to rehab secondary to suspision of SAH, now ruled out  - Resume coumadin home dose 5 mg daily and adjust for INR 2-3  - adjust coumadin according to daily INR     #History of Myelodysplasia  - stable pancytopenia  - followed by Heme/Onc.   - On Vidaza every 28 days    #Osteoarthritis  - pain meds, modalities, ther-ex    #Chronic Microvascular Cerebrovascular Disease  - monitor    #HTN  - controlled on current meds    #HLD  - on statin    #[place]  - [employer]  - consider outpatient Audiology eval    #Hypothyroidism  - on synthroid    #Pain control:   - rib fracture  - neuropathy pain  - Tylenol prn, Cymbalta    #GI/Bowel Mgmt: Contipation in hospital  - continue bowel meds and monitor    - Diet: Regular with salt tabs         Precautions / PROPHYLAXIS:      - Falls    - Ortho: Weight bearing status: WBAT      - DVT prophylaxis: coumadin (d/c Lovenox when INR therapeutic)
I reviewed the chart and examined the patient with the resident and we discussed the findings and treatment plan.  The patient is tolerating the rehab program well. I agree with the findings and treatment plan above, which I modified as indicated. The patient requires 3 hrs a day of acute inpatient rehab. No new complaints. Alert and feeling back to herself. Na+ level improved. Will begin to wean NaCl tomorrow if levels remain WNL.  Continue full rehab program.    I read, edited and agree with the Assessment:  #Rehab of decline in mobility and cognition, s/p fall c/w Metabolic Encephalopathy from Hyponatremia. She also has multiple rib fractures 7- 10 on the right with pain.   She notes confusion and weakness prior to her fall and neuro w/u was c/w vascular calcification (not SAH), and moderate generalized microvascular ischemia. She also has severe OA of both knees, further limiting her mobility and Peripheral Neuropathy of unclear etiology. She requires close medical supervision because of her hyponatremia currently being treated with fluid restriction and Sodium tablets. She requires and can tolerate 3 hrs a day of intensive interdisciplinary rehab including PT, OT and ST and Neuoropsych eval.     #Hyponatremia  - improving since admission, s/p IV saline, on fluid restriction and NaCl tabs 3 gms. tid.   - was on HCTZ on admission, stopped  - followed by Renal Medicine;  - monitor daily  - 127>125>134 > 137  - wean salt tabs    #Hypomagnesemia  - Continue supplementation and monitor    #Hypokalemia  - 3.5 > 3.9   - improved    #Peripheral Neuropathy  - dysesthesias of feet and atrophy of intrinsic muscles of hands  - unknown etiology  - was on Trileptal at home and now on Cymbalta    #History of Chronic Atrial Fibrillation  - rate controlled. Currently RRR  - Coumadin held prior to admission to rehab secondary to suspision of SAH, now ruled out  - Resume coumadin home dose 5 mg daily and adjust for INR 2-3  - adjust coumadin according to daily INR  - INR 1.51 10/24. Continue home dose of 5 mg daily. Slowly increasing    #History of Myelodysplasia  - stable pancytopenia  - followed by Heme/Onc.   - On Vidaza every 28 days    #Osteoarthritis  - pain meds, modalities, ther-ex    #Chronic Microvascular Cerebrovascular Disease  - monitor    #HTN  - controlled on current meds    #HLD  - on statin    #[place]  - [employer]  - consider outpatient Audiology eval    #Hypothyroidism  - on synthroid    #Pain control:   - rib fracture  - neuropathy pain  - Tylenol prn, Cymbalta    #GI/Bowel Mgmt: Contipation in hospital  - continue bowel meds and monitor    - Diet: Regular with salt tabs
I reviewed the chart and examined the patient with the resident and we discussed the findings and treatment plan.  The patient is tolerating the rehab program well. I agree with the findings and treatment plan above, which I modified as indicated. The patient requires 3 hrs a day of acute inpatient rehab. No new complaints. Ambulates with RW with CG. Limited by bilateral arthritic knee pain and generla weakness. Na+ improved. Wean salt tabs to 2 mg with meals. Making gains.    I read, edited and agree with the Assessment:  #Rehab of decline in mobility and cognition, s/p fall c/w Metabolic Encephalopathy from Hyponatremia. She also has multiple rib fractures 7- 10 on the right with pain.   She notes confusion and weakness prior to her fall and neuro w/u was c/w vascular calcification (not SAH), and moderate generalized microvascular ischemia. She also has severe OA of both knees, further limiting her mobility and Peripheral Neuropathy of unclear etiology. She requires close medical supervision because of her hyponatremia currently being treated with fluid restriction and Sodium tablets. She requires and can tolerate 3 hrs a day of intensive interdisciplinary rehab including PT, OT and ST and Neuoropsych eval.     #Hyponatremia  - improving since admission, s/p IV saline, on fluid restriction and NaCl tabs 3 gms. tid.   - was on HCTZ on admission, stopped  - followed by Renal Medicine;  - monitor daily  - 127>125>134 > 137  - wean salt tabs    #Hypomagnesemia  - Continue supplementation and monitor    #Hypokalemia  - 3.5 > 3.9   - improved    #Peripheral Neuropathy  - dysesthesias of feet and atrophy of intrinsic muscles of hands  - unknown etiology  - was on Trileptal at home and now on Cymbalta    #History of Chronic Atrial Fibrillation  - rate controlled. Currently RRR  - Coumadin held prior to admission to rehab secondary to suspicion of SAH, now ruled out  - Resume coumadin home dose 5 mg daily and adjust for INR 2-3  - adjust coumadin according to daily INR  - INR 1.66 10/25. Continue home dose of 5 mg daily. Slowly increasing    #History of Myelodysplasia  - stable pancytopenia  - followed by Heme/Onc.   - On Vidaza every 28 days    #Osteoarthritis  - pain meds, modalities, ther-ex    #Chronic Microvascular Cerebrovascular Disease  - monitor    #HTN  - controlled on current meds    #HLD  - on statin    #[place]  - Pocket [employer]er  - consider outpatient Audiology eval    #Hypothyroidism  - on synthroid    #Pain control:   - rib fracture  - neuropathy pain  - Tylenol prn, Cymbalta    #GI/Bowel Mgmt: Contipation in hospital  - continue bowel meds and monitor    - Diet: Regular with salt tabs
I reviewed the chart and examined the patient with the resident and we discussed the findings and treatment plan.  The patient is tolerating the rehab program well. I agree with the findings and treatment plan above, which I modified as indicated. The patient requires 3 hrs a day of acute inpatient rehab. No new complaints. Denies pain. Alert. PE stable. VSS. Ambulates with RW CG/ supervision. INR therapeutic on 4 mg coumadin.  Antic d/c home tomorrow with [facility] services and will need f/u of electrolytes in 1 week. NaCl tabs weaned to 1 mg tid with meals.     I read, edited and agree with the Assessment:  #Rehab of decline in mobility and cognition, s/p fall c/w Metabolic Encephalopathy from Hyponatremia. She also has multiple rib fractures 7- 10.    #Hyponatremia  - improving since admission, s/p IV saline, on fluid restriction and NaCl tabs.  - was on Trileptal, HCTZ on admission, stopped  - followed by Renal Medicine;  - monitor daily  - 127>125>134 > 137 >133>135>133  - Sodium borderline low on 10/18. Continue fluid restriction and monitor.   - Continue salt tabs 1g TID. Will need f/u BMP in 1 week    #constipation, improved  - chronic problem  - senna daily, miralax BID, dulcolax daily   - lactulose daily   - suppository PRN  - had results 10/18.   - continue regimen and monitor    #Hypomagnesemia  - Continue supplementation and monitor    #Hypokalemia  - 3.5 > 3.9 > 4.3>4.3  - improved    #Peripheral Neuropathy  - dysesthesias of feet and atrophy of intrinsic muscles of hands  - unknown etiology  - was on Trileptal at home and now on Cymbalta    #History of Chronic Atrial Fibrillation  - rate controlled. Currently RRR  - Coumadin held prior to admission to rehab secondary to suspicion of SAH, now ruled out  - Resume coumadin home dose 5 mg daily and adjust for INR 2-3  - adjust coumadin according to daily INR  - INR 2.30, Will give Warfarin 4 today and continue to monitor    #History of Myelodysplasia  - stable pancytopenia. Will continue to monitor   - followed by Heme/Onc.   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
I reviewed the chart and examined the patient with the resident and we discussed the findings and treatment plan.  The patient is tolerating the rehab program well. I agree with the findings and treatment plan above, which I modified as indicated. The patient requires 3 hrs a day of acute inpatient rehab. No new complaints. Alert. VSS. Neuro stable. Still constipated. Add 4 oz. MagCitrate. Ambulating with RW with CG. Cognitive deficits improving. Continue rehab.     I read, edited and agree with the Assessment:  #Rehab of decline in mobility and cognition, s/p fall c/w Metabolic Encephalopathy from Hyponatremia. She also has multiple rib fractures 7- 10 on the right with pain   She notes confusion and weakness prior to her fall and neuro w/u was c/w vascular calcification (not SAH), and moderate generalized microvascular ischemia. She also has severe OA of both knees, further limiting her mobility and Peripheral Neuropathy of unclear etiology. She requires close medical supervision because of her hyponatremia currently being treated with fluid restriction and Sodium tablets. She requires and can tolerate 3 hrs a day of intensive interdisciplinary rehab including PT, OT and ST and Neuoropsych eval.   Improving    #Hyponatremia  - improving since admission, s/p IV saline, on fluid restriction and NaCl tabs 3 gms. tid.   - was on Trileptal, HCTZ on admission, stopped  - followed by Renal Medicine;  - monitor daily  - 127>125>134 > 137  - wean salt tabs    # BLE calf pain  - tender to touch, left worse than right  - on coumadin already for afib    #constipation   - chronic problem  - senna daily, miralax BID, dulcolax daily   - suppository PRN  - give 4 oz MagCitrate 10/27    #Hypomagnesemia  - Continue supplementation and monitor    #Hypokalemia  - 3.5 > 3.9   - improved    #Peripheral Neuropathy  - dysesthesias of feet and atrophy of intrinsic muscles of hands  - unknown etiology  - was on Trileptal at home and now on Cymbalta    #History of Chronic Atrial Fibrillation  - rate controlled. Currently RRR  - Coumadin held prior to admission to rehab secondary to suspicion of SAH, now ruled out  - Resume coumadin home dose 5 mg daily and adjust for INR 2-3  - adjust coumadin according to daily INR  - INR 1.86 10/27. will give 5mg (home dose) coumadin  - if maintained in therapeutic range, can discontinue lovenox tomorrow     #History of Myelodysplasia  - stable pancytopenia  - followed by Heme/Onc.   - On Vidaza every 28 days     #Osteoarthritis  - pain meds, modalities, ther-ex    #Chronic Microvascular Cerebrovascular Disease  - monitor    #HTN  - controlled on current meds    #HLD  - on statin    #[place]  - Pocket [employer]er  - consider outpatient Audiology eval    #Hypothyroidism  - on synthroid
I reviewed the chart and examined the patient with the resident and we discussed the findings and treatment plan.  The patient is tolerating the rehab program well. I agree with the findings and treatment plan above, which I modified as indicated. The patient requires 3 hrs a day of acute inpatient rehab. No new complaints. Neuro stable. Labs and vital reviewed. Mild Hyponatremia. Continue salt tabs 2 gm tid and fluid restriction and monitor. INR 1.84. Will give Coumadin 6 mg today and monitor closely. Ambulating with RW with CG. Continue rehab program.     I read, edited and agree with the Assessment:  #Rehab of decline in mobility and cognition, s/p fall c/w Metabolic Encephalopathy from Hyponatremia. She also has multiple rib fractures 7- 10 on the right with pain   She notes confusion and weakness prior to her fall and neuro w/u was c/w vascular calcification (not SAH), and moderate generalized microvascular ischemia. She also has severe OA of both knees, further limiting her mobility and Peripheral Neuropathy of unclear etiology. She requires close medical supervision because of her hyponatremia currently being treated with fluid restriction and Sodium tablets. She requires and can tolerate 3 hrs a day of intensive interdisciplinary rehab including PT, OT and ST and Neuoropsych eval.   Improving    #Hyponatremia  - improving since admission, s/p IV saline, on fluid restriction and NaCl tabs 3 gms. tid.   - was on Trileptal, HCTZ on admission, stopped  - followed by Renal Medicine;  - monitor daily  - 127>125>134 > 137 >133  - Sodium borderline low on 10/18. Continue salt tabs 2 mg with meals and fluid restriction and monitor    # BLE calf pain  - tender to touch, left worse than right, no cord, no swelling, no discoloration  - on coumadin already for afib    #constipation, improved  - chronic problem  - senna daily, miralax BID, dulcolax daily   - lactulose daily   - suppository PRN  - had results 10/18.   - continue regimen and monitor    #Hypomagnesemia  - Continue supplementation and monitor    #Hypokalemia  - 3.5 > 3.9 > 4.3  - improved    #Peripheral Neuropathy  - dysesthesias of feet and atrophy of intrinsic muscles of hands  - unknown etiology  - was on Trileptal at home and now on Cymbalta    #History of Chronic Atrial Fibrillation  - rate controlled. Currently RRR  - Coumadin held prior to admission to rehab secondary to suspicion of SAH, now ruled out  - Resume coumadin home dose 5 mg daily and adjust for INR 2-3  - adjust coumadin according to daily INR  - INR 1.84 10/28. will give 6mg (home dose is 5mg) coumadin  - when gets in therapeutic range, discontinue lovenox     #History of Myelodysplasia  - stable pancytopenia  - followed by Heme/Onc.   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
I reviewed the chart and examined the patient with the resident and we discussed the findings and treatment plan.  The patient is tolerating the rehab program well. I agree with the findings and treatment plan above, which I modified as indicated. The patient requires 3 hrs a day of acute inpatient rehab. No new complaints. Alert and coherent. Participating well in therapies. Labs and VSS. Will wean salt tabs every couple of days and monitor sodium levels. Agree with Coumadin 4 mg today and recheck labs in am. Cognition improved. Ambulating with RW with CG. Continue acute rehab program.     I read, edited and agree with the Assessment:  #Rehab of decline in mobility and cognition, s/p fall c/w Metabolic Encephalopathy from Hyponatremia. She also has multiple rib fractures 7- 10 on the right with pain.   She notes confusion and weakness prior to her fall and neuro w/u was c/w vascular calcification (not SAH), and moderate generalized microvascular ischemia. She also has severe OA of both knees, further limiting her mobility and Peripheral Neuropathy of unclear etiology. She requires close medical supervision because of her hyponatremia currently being treated with fluid restriction and Sodium tablets. She requires and can tolerate 3 hrs a day of intensive interdisciplinary rehab including PT, OT and ST and Neuoropsych eval.     #Hyponatremia  - improving since admission, s/p IV saline, on fluid restriction and NaCl tabs 3 gms. tid.   - was on Trileptal, HCTZ on admission, stopped  - followed by Renal Medicine;  - monitor daily  - 127>125>134 > 137  - wean salt tabs    # BLE calf pain  - tender to touch, left worse than right  - on coumadin already for afib  - f/u dopplers     #constipation   - chronic problem  - senna daily, miralax BID, dulcolax daily   - suppository PRN    #Hypomagnesemia  - Continue supplementation and monitor    #Hypokalemia  - 3.5 > 3.9   - improved    #Peripheral Neuropathy  - dysesthesias of feet and atrophy of intrinsic muscles of hands  - unknown etiology  - was on Trileptal at home and now on Cymbalta    #History of Chronic Atrial Fibrillation  - rate controlled. Currently RRR  - Coumadin held prior to admission to rehab secondary to suspicion of SAH, now ruled out  - Resume coumadin home dose 5 mg daily and adjust for INR 2-3  - adjust coumadin according to daily INR  - INR 2.03 10/26. will give 4mg coumadin  - if maintained in therapeutic range, can discontinue lovenox tomorrow     #History of Myelodysplasia  - stable pancytopenia  - followed by Heme/Onc.   - On Vidaza every 28 days     #Osteoarthritis  - pain meds, modalities, ther-ex    #Chronic Microvascular Cerebrovascular Disease  - monitor    #HTN  - controlled on current meds    #HLD  - on statin    #[place]  - Pocket [employer]er  - consider outpatient Audiology eval    #Hypothyroidism  - on synthroid    #Pain control:   - rib fracture  - neuropathy pain  - Tylenol prn, Cymbalta    #GI/Bowel Mgmt: Contipation in hospital  - continue bowel meds and monitor    - Diet: Regular with salt tabs

## 2022-11-02 NOTE — DISCHARGE NOTE PROVIDER - PROVIDER TOKENS
PROVIDER:[TOKEN:[[id number]]],PROVIDER:[TOKEN:[[id number]]],PROVIDER:[TOKEN:[[id number]]],PROVIDER:[TOKEN:[[id number]]]

## 2022-11-02 NOTE — PROGRESS NOTE ADULT - ASSESSMENT
ASSESSMENT/PLAN    #Rehab of decline in mobility and cognition, s/p fall c/w Metabolic Encephalopathy from Hyponatremia. She also has multiple rib fractures 7- 10 on the right with pain   She notes confusion and weakness prior to her fall and neuro w/u was c/w vascular calcification (not SAH), and moderate generalized microvascular ischemia. She also has severe OA of both knees, further limiting her mobility and Peripheral Neuropathy of unclear etiology. She requires close medical supervision because of her hyponatremia currently being treated with fluid restriction and Sodium tablets. She requires and can tolerate 3 hrs a day of intensive interdisciplinary rehab including PT, OT and ST and Neuoropsych eval.   Improving    #Hyponatremia  - improving since admission, s/p IV saline, on fluid restriction and NaCl tabs.  - was on Trileptal, HCTZ on admission, stopped  - followed by Renal Medicine;  - monitor daily  - 127>125>134 > 137 >133>135>133>131  - Sodium borderline low on 10/18. Continue fluid restriction and monitor.   - Continue salt tabs 1g TID. Will continue to monitor Na levels. Anticipate will discharge with current dosage/     # BLE calf pain (Stable)  - tender to touch, left worse than right, no cord, no swelling, no discoloration. Improving.   - on coumadin already for afib    #constipation, improved  - chronic problem  - senna daily, miralax BID, dulcolax daily   - lactulose daily   - suppository PRN  - had results 10/18.   - continue regimen and monitor    #Hypomagnesemia  - Continue supplementation and monitor    #Hypokalemia  - 3.5 > 3.9 > 4.3>4.3  - improved    #Peripheral Neuropathy  - dysesthesias of feet and atrophy of intrinsic muscles of hands  - unknown etiology  - was on Trileptal at home and now on Cymbalta    #History of Chronic Atrial Fibrillation  - rate controlled. Currently RRR  - Coumadin held prior to admission to rehab secondary to suspicion of SAH, now ruled out  - Resume coumadin home dose 5 mg daily and adjust for INR 2-3  - adjust coumadin according to daily INR  - INR 2.15, Will continue to give Warfarin 4 today and continue to monitor    #History of Myelodysplasia  - stable pancytopenia. Will continue to monitor   - followed by Heme/Onc.   - On Vidaza every 28 days     #Osteoarthritis  - pain meds, modalities, ther-ex    #Chronic Microvascular Cerebrovascular Disease  - monitor    #HTN  - controlled on current meds    #HLD  - on statin    #[place]  - [employer]  - consider outpatient Audiology eval    #Hypothyroidism  - on synthroid    #Pain control:   - rib fracture  - neuropathy pain  - Tylenol prn, Cymbalta    #GI/Bowel Mgmt: Contipation in hospital  - continue bowel meds and monitor    - Diet: Regular with salt tabs         Precautions / PROPHYLAXIS:      - Falls    - Ortho: Weight bearing status: WBAT      - DVT prophylaxis: coumadin     ASSESSMENT/PLAN    #Rehab of decline in mobility and cognition, s/p fall c/w Metabolic Encephalopathy from Hyponatremia. She also has multiple rib fractures 7- 10 on the right with pain   She notes confusion and weakness prior to her fall and neuro w/u was c/w vascular calcification (not SAH), and moderate generalized microvascular ischemia. She also has severe OA of both knees, further limiting her mobility and Peripheral Neuropathy of unclear etiology. She requires close medical supervision because of her hyponatremia currently being treated with fluid restriction and Sodium tablets. She requires and can tolerate 3 hrs a day of intensive interdisciplinary rehab including PT, OT and ST and Neuoropsych eval.   Improving    #Hyponatremia  - improving since admission, s/p IV saline, on fluid restriction and NaCl tabs.  - was on Trileptal, HCTZ on admission, stopped  - followed by Renal Medicine;  - monitor daily  - 127>125>134 > 137 >133>135>133>131  - Sodium borderline low on 10/18. Continue fluid restriction and monitor.   - Will adjust NaCl tabs to 2g TID (11/2). Will continue to monitor Na levels.     # BLE calf pain (Stable)  - tender to touch, left worse than right, no cord, no swelling, no discoloration. Improving.   - on coumadin already for afib    #constipation, improved  - chronic problem  - senna daily, miralax BID, dulcolax daily   - lactulose daily   - suppository PRN  - had results 10/18.   - continue regimen and monitor    #Hypomagnesemia  - Continue supplementation and monitor    #Hypokalemia  - 3.5 > 3.9 > 4.3>4.3  - improved    #Peripheral Neuropathy  - dysesthesias of feet and atrophy of intrinsic muscles of hands  - unknown etiology  - was on Trileptal at home and now on Cymbalta    #History of Chronic Atrial Fibrillation  - rate controlled. Currently RRR  - Coumadin held prior to admission to rehab secondary to suspicion of SAH, now ruled out  - Resume coumadin home dose 5 mg daily and adjust for INR 2-3  - adjust coumadin according to daily INR  - INR 2.15, Will give Warfarin 5mg today and continue to monitor    #History of Myelodysplasia  - stable pancytopenia. Will continue to monitor   - followed by Heme/Onc.   - On Vidaza every 28 days     #Osteoarthritis  - pain meds, modalities, ther-ex    #Chronic Microvascular Cerebrovascular Disease  - monitor    #HTN  - controlled on current meds    #HLD  - on statin    #[place]  - [employer]ocket [employer]er  - consider outpatient Audiology eval    #Hypothyroidism  - on synthroid    #Pain control:   - rib fracture  - neuropathy pain  - Tylenol prn, Cymbalta    #GI/Bowel Mgmt: Contipation in hospital  - continue bowel meds and monitor    - Diet: Regular with salt tabs         Precautions / PROPHYLAXIS:      - Falls    - Ortho: Weight bearing status: WBAT      - DVT prophylaxis: coumadin     ASSESSMENT/PLAN    #Rehab of decline in mobility and cognition, s/p fall c/w Metabolic Encephalopathy from Hyponatremia. She also has multiple rib fractures 7- 10 on the right with pain   Patient doing well. Completed her acute rehab and is able to transfers with supervision and ambulate with a RW Supervision/ CG. She has some ongoing mild cognitive deficits which are likely baseline.  C/C home today with family and VN services; SN, PT, OT.   Physiatry f/u prn. Denies any pain from rib fractures.     #Hyponatremia  - improving since admission, s/p IV saline, on fluid restriction and NaCl tabs.  - was on Trileptal, HCTZ on admission, stopped  - followed by Renal Medicine;  - monitor daily  - 127>125>134 > 137 >133>135>133>131  - Sodium borderline low on 10/18. Continue fluid restriction and monitor.   - Will adjust NaCl tabs back to to 2g TID (11/2). Discussed with Renal, Dr. Tian, who will f/u patient in 1 week.    #constipation, improved  - chronic problem  - senna daily, miralax BID, dulcolax daily   - lactulose daily   - suppository PRN  - had results 10/18.   - controlled    #Hypomagnesemia  - Continue supplementation and monitor as outpatient    #Peripheral Neuropathy  - dysesthesias of feet and atrophy of intrinsic muscles of hands  - unknown etiology  - was on Trileptal at home and now on Cymbalta    #History of Chronic Atrial Fibrillation  - rate controlled. Currently RRR  - Coumadin held prior to admission to rehab secondary to suspicion of SAH, now ruled out  - Resume coumadin home dose 5 mg daily and adjust for INR 2-3  - adjust coumadin according to daily INR  - INR 2.15, Discharge on coumadin 5 mg daily. F/U in Coumadin Clinic or with PMD ASAP    #History of Myelodysplasia  - stable pancytopenia. Will continue to monitor   - followed by Heme/Onc.   - On Vidaza every 28 days  - f/u PMD/ Onc    #Osteoarthritis  - pain meds, modalities, ther-ex with home PT    #Chronic Microvascular Cerebrovascular Disease  - monitor    #HTN  - controlled on current meds    #HLD  - on statin    #[place]  - consider outpatient Audiology eval    #Hypothyroidism  - on synthroid    #Pain control:   - rib fracture  - neuropathy pain  - Tylenol prn, Cymbalta    #GI/Bowel Mgmt: Contipation in hospital  - continue bowel meds and monitor    - Diet: Regular with salt tabs

## 2022-11-02 NOTE — PROGRESS NOTE ADULT - SUBJECTIVE AND OBJECTIVE BOX
Patient is a 87y old  Female who presents with a chief complaint of Metabolic Encephalopathy with decline in function (21 Oct 2022 11:53)      HPI:  Patient is a 88 y/o female with PMHx of MDS ( on Vidaza- gets every 28 days- just finished course- due in around 3 weeks) HTN, HLD, A-Fib ( On Coumadin 5mg daily), hypothyroid, neuropathy of B/L LE, Trigeminal Neuralgia ( Prior Gamma knife procedure for pain), B/L OA of the knees, prior CCY who presented to [facility] s/p fall at home. Patient was ambulating at home with the walker when she fell onto the right side. She doesn't have the best recollection of the event but denies LOC. She has a home health aide and her grandson was also home at the time. She was able to stand and walk but presented as notes ongoing dizziness and pain over her right chest. Pain over the right side is dull, worse with motion, but not sharp. She notes dizziness notable after fall but not prior. With the dizziness she gets occasional nausea. Prior to fall she denies any change in medications, new medications, recent illness, nor any other features out different from her baseline .  Patient noted on workup to have right sided fracture of the 7-10th right ribs without displacement. On Incentive she can do around 500 but without respiratory distress or hemodynamic instability. She was also noted on CT to have a small right sub arachnoid hemorrhage. Admitted to surgery and will have a SICU consult with possible SICU admission due to trauma burden. She was also noted to have a sodium of 120 and was on HCTZ. She has had a slow, improvement in her Na+ to 126 on fluid restriction and Na+ tabs. Neuro w/u revealed her SAH to be secondary to vascular calcification with a normal MRA and MRV and no further w/u recommended.     She is medically stable, but states that she has been confused and weak since about 1 week PTA. She requires CG/ min assist for transfers and CG for ambulation with a RW 40 feet only secondary to impaired endurance and mod assistance for LE dressing.   PTA, she live with her daughter and had a HHA 8 hrs. a day, 5 days a week. She reports being able to transfer and ambulate with a RW with supervision PTA and being able to dress herself. She was evaluated by me on the Trauma Service and was found to be a good acute inpatient rehab candidate.  (20 Oct 2022 13:56)    TODAY'S SUBJECTIVE & REVIEW OF SYMPTOMS:    Patient seen and examined bedside in the AM. No acute overnight events.   INR 2.15 this morning. Na 131, currently on 1g TID salt tablets. She has no acute complaints at the moment except some b/l arthritic knee pain, which is stable.     Denies any chest pain, nausea, vomiting, abdominal pain, or headache.   Voiding bladder and bowels spontaneously. Participating and tolerating PT/OT.   Anticipating discharge 11/3/22.     CLOF: bed mobility Supervision, transfers Supervision , Ambulating 150' RW TA      Constitutional    [ x  ] WNL        [   ] poor appetite   [   ] insomnia   [   ] tired      ENT:                                             [ x ]  , [place]      Cardio:                [ x  ] WNL           [   ] CP   [   ] BROWN   [   ] palpitations               Resp:                   [  x ] WNL           [   ] SOB   [   ] cough   [   ] wheezing   GI:                        [   ] WNL           [ x  ] constipation   [   ] diarrhea   [   ] abdominal pain   [   ] nausea   [   ] emesis                                :                      [x   ] WNL           [   ] KENNY  [   ] dysuria   [   ] difficulty voiding             Endo:                   [ x  ] WNL          [   ] polyuria   [   ] temperature intolerance                 Skin:                     [ x  ] WNL          [   ] pain   [   ] wound   [   ] rash   MSK:                    [   ] WNL          [   ] muscle pain   [ x  ] joint pain/ stiffness both knees   [   ] muscle tenderness   [   ] swelling   Neuro:                 [   ] WNL          [   ] HA   [   ] change in vision   [   ] tremor   [x   ] weakness   [   ]dysphagia              Cognitive:           [  x ] WNL           [   ]confusion      Psych:                  [   ] WNL           [   ] hallucinations   [   ]agitation   [   ] delusion   [   ]depression [ x] anxiety      PHYSICAL EXAM  Vital Signs Last 24 Hrs  T(C): 36.2 (02 Nov 2022 06:31), Max: 36.5 (01 Nov 2022 21:17)  T(F): 97.2 (02 Nov 2022 06:31), Max: 97.7 (01 Nov 2022 21:17)  HR: 101 (02 Nov 2022 06:31) (78 - 101)  BP: 148/80 (02 Nov 2022 06:43) (126/60 - 183/92)  BP(mean): --  RR: 20 (02 Nov 2022 06:31) (18 - 20)  SpO2: --        Constitutional - [ x  ] NAD, Comfortable        [   ] other:  Chest - [ x   ] CTA     [   ] other:  Cardiovascular - [  x ] RRR, no murmur     [   ] other:  Abdomen - [ x  ] Soft, NT/ND      [   ] other:        - [ x ]  No KENNY CATHETER   [   ] YES  if yes: [   ] NO MEATAL TEAR OR DISCHARGE [   ] other:  Extremities - [ x ]  No C/C/E, No calf tenderness       [ x  ] other:    ROM - [ x  ] WFL     [   ] other: chronic arthritic changes both knees. Atrophy of intrinsic muscles of hands. Full PROM on LE B/L                                     Neurologic Exam -                 Cognitive - [ x  ]Awake, Alert, AAO to self, place, date, year, situation         [    ] other:       Communication - [ x  ]Fluent, No dysarthria       [   ] other:      Motor - No focal deficits                    Right UE -  [ x  ] WNL      [    ] other:                    Left UE -     [ x  ] WNL      [    ] other:                    Right LE -   [  x ] WNL       [    ] other: 4+/5 HF, KE.  5/5 the rest                    Left LE -      [x   ]WNL        [    ] other: 4+/5 HF, KE. 5/5 the rest      Sensory - [  x ] Intact to LT, burning dysesthesias both feet    [    ] other:          Reflexes - [ x  ] wnl/ symmetric     [   ] other:     Psychiatric - [ x  ]Mood stable, Affect WNL     [   ]other:     Skin - [  x ] intact      [   ] other    MEDICATIONS  (STANDING):  acetaminophen     Tablet .. 650 milliGRAM(s) Oral every 6 hours  atorvastatin 10 milliGRAM(s) Oral at bedtime  bisacodyl 10 milliGRAM(s) Oral daily  DULoxetine 30 milliGRAM(s) Oral every 12 hours  glycerin Suppository - Adult 1 Suppository(s) Rectal once  influenza  Vaccine (HIGH DOSE) 0.7 milliLiter(s) IntraMuscular once  lactulose Syrup 10 Gram(s) Oral daily  levothyroxine 50 MICROGram(s) Oral daily  lidocaine   4% Patch 1 Patch Transdermal every 24 hours  losartan 100 milliGRAM(s) Oral daily  magnesium oxide 400 milliGRAM(s) Oral three times a day with meals  metoprolol tartrate 25 milliGRAM(s) Oral every 12 hours  NIFEdipine XL 30 milliGRAM(s) Oral daily  oxybutynin 5 milliGRAM(s) Oral every 12 hours  pantoprazole    Tablet 40 milliGRAM(s) Oral before breakfast  polyethylene glycol 3350 17 Gram(s) Oral two times a day  senna 1 Tablet(s) Oral at bedtime  sodium chloride 1 Gram(s) Oral three times a day  sodium chloride 0.65% Nasal 1 Spray(s) Both Nostrils three times a day  warfarin 4 milliGRAM(s) Oral once    MEDICATIONS  (PRN):  magnesium hydroxide Suspension 30 milliLiter(s) Oral daily PRN Constipation        11-02    131<L>  |  97<L>  |  13  ----------------------------<  95  4.2   |  29  |  0.5<L>    Ca    8.7      02 Nov 2022 05:54  Mg     1.7     11-02      PT/INR - ( 02 Nov 2022 05:54 )   PT: 25.10 sec;   INR: 2.15 ratio                Patient is a 87y old  Female who presents with a chief complaint of Metabolic Encephalopathy with decline in function (21 Oct 2022 11:53)      HPI:  Patient is a 88 y/o female with PMHx of MDS ( on Vidaza- gets every 28 days- just finished course- due in around 3 weeks) HTN, HLD, A-Fib ( On Coumadin 5mg daily), hypothyroid, neuropathy of B/L LE, Trigeminal Neuralgia ( Prior Gamma knife procedure for pain), B/L OA of the knees, prior CCY who presented [facility] s/p fall at home. Patient was ambulating at home with the walker when she fell onto the right side. She doesn't have the best recollection of the event but denies LOC. She has a home health aide and her grandson was also home at the time. She was able to stand and walk but presented as notes ongoing dizziness and pain over her right chest. Pain over the right side is dull, worse with motion, but not sharp. She notes dizziness notable after fall but not prior. With the dizziness she gets occasional nausea. Prior to fall she denies any change in medications, new medications, recent illness, nor any other features out different from her baseline .  Patient noted on workup to have right sided fracture of the 7-10th right ribs without displacement. On Incentive she can do around 500 but without respiratory distress or hemodynamic instability. She was also noted on CT to have a small right sub arachnoid hemorrhage. Admitted to surgery and will have a SICU consult with possible SICU admission due to trauma burden. She was also noted to have a sodium of 120 and was on HCTZ. She has had a slow, improvement in her Na+ to 126 on fluid restriction and Na+ tabs. Neuro w/u revealed her SAH to be secondary to vascular calcification with a normal MRA and MRV and no further w/u recommended.     She is medically stable, but states that she has been confused and weak since about 1 week PTA. She requires CG/ min assist for transfers and CG for ambulation with a RW 40 feet only secondary to impaired endurance and mod assistance for LE dressing.   PTA, she live with her daughter and had a HHA 8 hrs. a day, 5 days a week. She reports being able to transfer and ambulate with a RW with supervision PTA and being able to dress herself. She was evaluated by me on the Trauma Service and was found to be a good acute inpatient rehab candidate.  (20 Oct 2022 13:56)    TODAY'S SUBJECTIVE & REVIEW OF SYMPTOMS:    Patient seen and examined bedside in the AM. No acute overnight events.   INR 2.15 this morning. Na 131, currently on 1g TID salt tablets. She has no acute complaints at the moment except some b/l arthritic knee pain, which is stable.     Denies any chest pain, nausea, vomiting, abdominal pain, or headache.   Voiding bladder and bowels spontaneously. Participating and tolerating PT/OT.   Anticipating discharge 11/3/22.     CLOF: bed mobility Supervision, transfers Supervision , Ambulating 150' RW TA      Constitutional    [ x  ] WNL        [   ] poor appetite   [   ] insomnia   [   ] tired      ENT:                                             [ x ]  , [place]      Cardio:                [ x  ] WNL           [   ] CP   [   ] BROWN   [   ] palpitations               Resp:                   [  x ] WNL           [   ] SOB   [   ] cough   [   ] wheezing   GI:                        [   ] WNL           [ x  ] constipation   [   ] diarrhea   [   ] abdominal pain   [   ] nausea   [   ] emesis                                :                      [x   ] WNL           [   ] KENNY  [   ] dysuria   [   ] difficulty voiding             Endo:                   [ x  ] WNL          [   ] polyuria   [   ] temperature intolerance                 Skin:                     [ x  ] WNL          [   ] pain   [   ] wound   [   ] rash   MSK:                    [   ] WNL          [   ] muscle pain   [ x  ] joint pain/ stiffness both knees   [   ] muscle tenderness   [   ] swelling   Neuro:                 [   ] WNL          [   ] HA   [   ] change in vision   [   ] tremor   [x   ] weakness   [   ]dysphagia              Cognitive:           [  x ] WNL           [   ]confusion      Psych:                  [ x  ] WNL           [   ] hallucinations   [   ]agitation   [   ] delusion   [   ]depression [ x] anxiety      PHYSICAL EXAM  Vital Signs Last 24 Hrs  T(C): 36.2 (02 Nov 2022 06:31), Max: 36.5 (01 Nov 2022 21:17)  T(F): 97.2 (02 Nov 2022 06:31), Max: 97.7 (01 Nov 2022 21:17)  HR: 101 (02 Nov 2022 06:31) (78 - 101)  BP: 148/80 (02 Nov 2022 06:43) (126/60 - 183/92)  BP(mean): --  RR: 20 (02 Nov 2022 06:31) (18 - 20)  SpO2: --        Constitutional - [ x  ] NAD, Comfortable        [   ] other:  Chest - [ x   ] CTA     [   ] other:  Cardiovascular - [  x ] RRR, no murmur     [   ] other:  Abdomen - [ x  ] Soft, NT/ND      [   ] other:        - [ x ]  No KENNY CATHETER   [   ] YES  if yes: [   ] NO MEATAL TEAR OR DISCHARGE [   ] other:  Extremities - [ x ]  No C/C/E, No calf tenderness       [ x  ] other:    ROM - [ x  ] WFL     [   ] other: chronic arthritic changes both knees. Atrophy of intrinsic muscles of hands. Full PROM on LE B/L                                     Neurologic Exam -                 Cognitive - [ x  ]Awake, Alert, AAO to self, place, date, year, situation         [    ] other:       Communication - [ x  ]Fluent, No dysarthria       [   ] other:      Motor - No focal deficits                    Right UE -  [ x  ] WNL      [    ] other:                    Left UE -     [ x  ] WNL      [    ] other:                    Right LE -   [  x ] WNL       [    ] other: 4+/5 HF, KE.  5/5 the rest                    Left LE -      [x   ]WNL        [    ] other: 4+/5 HF, KE. 5/5 the rest      Sensory - [  x ] Intact to LT, burning dysesthesias both feet    [    ] other:          Reflexes - [ x  ] wnl/ symmetric     [   ] other:     Psychiatric - [ x  ]Mood stable, Affect WNL     [   ]other:     Skin - [  x ] intact      [   ] other    MEDICATIONS  (STANDING):  acetaminophen     Tablet .. 650 milliGRAM(s) Oral every 6 hours  atorvastatin 10 milliGRAM(s) Oral at bedtime  bisacodyl 10 milliGRAM(s) Oral daily  DULoxetine 30 milliGRAM(s) Oral every 12 hours  glycerin Suppository - Adult 1 Suppository(s) Rectal once  influenza  Vaccine (HIGH DOSE) 0.7 milliLiter(s) IntraMuscular once  lactulose Syrup 10 Gram(s) Oral daily  levothyroxine 50 MICROGram(s) Oral daily  lidocaine   4% Patch 1 Patch Transdermal every 24 hours  losartan 100 milliGRAM(s) Oral daily  magnesium oxide 400 milliGRAM(s) Oral three times a day with meals  metoprolol tartrate 25 milliGRAM(s) Oral every 12 hours  NIFEdipine XL 30 milliGRAM(s) Oral daily  oxybutynin 5 milliGRAM(s) Oral every 12 hours  pantoprazole    Tablet 40 milliGRAM(s) Oral before breakfast  polyethylene glycol 3350 17 Gram(s) Oral two times a day  senna 1 Tablet(s) Oral at bedtime  sodium chloride 1 Gram(s) Oral three times a day  sodium chloride 0.65% Nasal 1 Spray(s) Both Nostrils three times a day  warfarin 4 milliGRAM(s) Oral once    MEDICATIONS  (PRN):  magnesium hydroxide Suspension 30 milliLiter(s) Oral daily PRN Constipation        11-02    131<L>  |  97<L>  |  13  ----------------------------<  95  4.2   |  29  |  0.5<L>    Ca    8.7      02 Nov 2022 05:54  Mg     1.7     11-02      PT/INR - ( 02 Nov 2022 05:54 )   PT: 25.10 sec;   INR: 2.15 ratio

## 2022-11-02 NOTE — DISCHARGE NOTE NURSING/CASE MANAGEMENT/SOCIAL WORK - NSDCPEFALRISK_GEN_ALL_CORE
For information on Fall & Injury Prevention, visit: [email or web address]  [email or web address] OR  https://www.cdc.gov/steadi/patient.html

## 2022-11-02 NOTE — DISCHARGE NOTE PROVIDER - CARE PROVIDERS DIRECT ADDRESSES
[email or web address]

## 2022-11-02 NOTE — DISCHARGE NOTE PROVIDER - INSTRUCTIONS
Low fat , low cholesterol   diet with fluid restriction 1 liter per day , NO salt restriction for now , you are on  sodium tablets  as per your nephrologist ,

## 2022-11-02 NOTE — PROGRESS NOTE ADULT - REASON FOR ADMISSION
Metabolic Encephalopathy with decline in function

## 2022-11-02 NOTE — DISCHARGE NOTE PROVIDER - NSDCFUADDINST_GEN_ALL_CORE_FT
Please carry these paper work with you for all medical appointments   top show to your health care providers >

## 2022-11-02 NOTE — DISCHARGE NOTE PROVIDER - NSDCCPCAREPLAN_GEN_ALL_CORE_FT
PRINCIPAL DISCHARGE DIAGNOSIS  Diagnosis: Cerebral microvascular disease  Assessment and Plan of Treatment: You were admitted after a fall  and initially thought you had small Sub arachnoid hemorrhage . After MRi of head which was evaluate dby Neuro surgery   No bleed was seen in your brain .  You dont have brain bleed, some ischemic microvascular disease was seen , continue your meds with statin and neuro follow up with dr Rudd in  2 to 4 weeks,      SECONDARY DISCHARGE DIAGNOSES  Diagnosis: Chronic atrial fibrillation  Assessment and Plan of Treatment: and Hypertension ( high blood pressure levels)  please Continue meds  and diet as advised. This include medication to control both blood pressure and  heart rate . Allow salt in your diet along with salt tablet since your sodium levels are low.   Medical follow up with your Primary doctor  Saman also needed in 1 week .    Diagnosis: Hyponatremia  Assessment and Plan of Treatment: A condition of your blood where your sodium levels are below normal . This is being treated with salt tablet and restricted oral fluid intake to 1 liter per day , please folllow up with your kidney ( nephrology ) doctor in a week for follow up blood work ( BMP )   Please continue to hold HCTZ tablet unless advised otherwise by your hctz is on hold .    Diagnosis: Pharyngeal irritation  Assessment and Plan of Treatment: Please follow up with an ENt doctor  for this finding of redness , swelling  seen in FEES (Fibrooptic evaluation of Swallow ) study by Speech lanquage pathology team.    Diagnosis: Trigeminal neuralgia  Assessment and Plan of Treatment: You were on trileptal  for this, this is discontinued by your medical team due to low sodium levels in blood. Please follow up with your Neuro doctor Yris  or neuro endovascular doctor Montana Delaney   for  more definitive treatment .  You are now on cymbalta  for depression / anxiety . Please continue this since this works for neuralgia too .    Diagnosis: MDS (myelodysplastic syndrome)  Assessment and Plan of Treatment: You have History of this , and you were on treatment with Vidaza   every 28 days . Please follow up with your Hematologist for  this .    Diagnosis: OAB (overactive bladder)  Assessment and Plan of Treatment: Please continue home med Myrbetriq  and follow up with Urologist or PMD as needed  or advised .

## 2022-11-02 NOTE — DISCHARGE NOTE PROVIDER - CARE PROVIDER_API CALL
Morgan Davison (MD)  Internal Medicine  [address] [place] [address]  Phone: [phone]  Fax: [phone]  Follow Up Time:     Alba Chavira)  Neurosurgery  [address] [facility] [place], [address]  [place] [address]  Phone: [phone]  Fax: [phone]  Follow Up Time:     Montana Delaney; [facility])  Mono thu Reid [facility] Neurosurgery Surgery  [address] [place] [address]  Phone: [phone]  Fax: [phone]  Follow Up Time:     Isiah Parsons)  Surgery  ENT  [address] [facility] [place], [address] [place] [address]  Phone: [phone]  Fax: [phone]  Follow Up Time:

## 2022-11-02 NOTE — DISCHARGE NOTE PROVIDER - NSDCMRMEDTOKEN_GEN_ALL_CORE_FT
acetaminophen 325 mg oral tablet: 2 tab(s) orally every 6 hours, As Needed, for pain or fever  atorvastatin 10 mg oral tablet: 1 tab(s) orally once a day (at bedtime)  DULoxetine 30 mg oral delayed release capsule: 1 cap(s) orally every 12 hours  levothyroxine 50 mcg (0.05 mg) oral tablet: 1 tab(s) orally once a day in the morning, take one hour before breakfast and other medications  losartan 100 mg oral tablet: 1 tab(s) orally once a day  magnesium oxide 400 mg oral tablet: 1 tab(s) orally 3 times a day (with meals)  metoprolol tartrate 25 mg oral tablet: 1 tab(s) orally every 12 hours  Myrbetriq 50 mg oral tablet, extended release: 1 tab(s) orally once a day  NIFEdipine 30 mg oral tablet, extended release: 1 tab(s) orally once a day  Salonpas 0.025%-1.25% topical film: Apply topically to affected area 3 times a day, apply to area on back or rib fracture sides with pain   sodium chloride 0.65% nasal spray: 1 drop(s) nasal 3 times a day, As Needed  sodium chloride 1 g oral tablet: 2 gram(s) orally 3 times a day   warfarin 5 mg oral tablet: 1 tab(s) orally once a day

## 2022-11-02 NOTE — DISCHARGE NOTE NURSING/CASE MANAGEMENT/SOCIAL WORK - PATIENT PORTAL LINK FT
You can access the FollowMyHealth Patient Portal offered by [facility] by registering at the following website: [email or web address]. By joining [employer]’s FollowMyHealth portal, you will also be able to view your health information using other applications (apps) compatible with our system.

## 2022-11-02 NOTE — DISCHARGE NOTE PROVIDER - HOSPITAL COURSE
Patient is a 86 y/o female with PMHx of MDS ( on Vidaza- gets every 28 days- just finished course- due in around 3 weeks) HTN, HLD, A-Fib ( On Coumadin 5mg daily), hypothyroid, neuropathy of B/L LE, Trigeminal Neuralgia ( Prior Gamma knife procedure for pain), B/L OA of the knees, prior CCY who presented to [facility] s/p fall at home. Patient was ambulating at home with the walker when she fell onto the right side. She doesn't have the best recollection of the event but denies LOC. She has a home health aide and her grandson was also home at the time. She was able to stand and walk but presented as notes ongoing dizziness and pain over her right chest. Pain over the right side is dull, worse with motion, but not sharp. She notes dizziness notable after fall but not prior. With the dizziness she gets occasional nausea. Prior to fall she denies any change in medications, new medications, recent illness, nor any other features out different from her baseline .  Patient noted on workup to have right sided fracture of the 7-10th right ribs without displacement. On Incentive she can do around 500 but without respiratory distress or hemodynamic instability. She was also noted on CT to have a small right sub arachnoid hemorrhage. Admitted to surgery and will have a SICU consult with possible SICU admission due to trauma burden. She was also noted to have a sodium of 120 and was on HCTZ. She has had a slow, improvement in her Na+ to 126 on fluid restriction and Na+ tabs. Neuro w/u revealed her SAH to be secondary to vascular calcification with a normal MRA and MRV and no further w/u recommended.. She is medically stable, but states that she has been confused and weak since about 1 week PTA. She requires CG/ min assist for transfers and CG for ambulation with a RW 40 feet only secondary to impaired endurance and mod assistance for LE dressing.   PTA, she live with her daughter and had a HHA 8 hrs. a day, 5 days a week. She reports being able to transfer and ambulate with a RW with supervision PTA and being able to dress herself. She was evaluated by me on the Trauma Service and was found to be a good acute inpatient rehab candidate.  (20 Oct 2022 13:56).  Today INR 2.15 this morning. Na 131, currently on 1g TID salt tablets  increased to 2 gm x3 per day .    Voiding bladder and bowels spontaneously. Participating and tolerating PT/OT.   Anticipating discharge 11/2/22.       #Rehab of decline in mobility and cognition, s/p fall c/w Metabolic Encephalopathy from Hyponatremia. She also has multiple rib fractures 7- 10 on the right with pain   She notes confusion and weakness prior to her fall and neuro w/u was c/w vascular calcification (not SAH), and moderate generalized microvascular ischemia. She also has severe OA of both knees, further limiting her mobility and Peripheral Neuropathy of unclear etiology. She requires close medical supervision because of her hyponatremia currently being treated with fluid restriction and Sodium tablets. She requires and can tolerate 3 hrs a day of intensive interdisciplinary rehab including PT, OT and ST and Neuoropsych eval.   Improving    #Hyponatremia  - improving since admission, s/p IV saline, on fluid restriction and NaCl tabs.  - was on Trileptal, HCTZ on admission, stopped due to low sodium . family is advised to follow up with neuro and or neuro endovascular doctor  for more advise on trigem Neuralgia   - followed by Renal Medicine;  - monitor daily  - 127>125>134 > 137 >133>135>133>131  - Sodium borderline low on 10/18. Continue fluid restriction and monitor.   - salt tabs In creased to 2 g TID. Will continue to monitor Na levels in renal office in1 week .       # BLE calf pain (Stable)  - tender to touch, left worse than right, no cord, no swelling, no discoloration. Improving.   - on coumadin already for afib. patient will follow up with her Pmd dr Davison on sunday 11/6  for inr monitoring , until then pt will be on 5 mg coumadin hs     #constipation, improved  - chronic problem  - senna daily, miralax BID, dulcolax daily   - lactulose daily   - suppository PRN  - had results 10/18.   - continue regimen and monitor    #Hypomagnesemia  - Continue supplementation and monitor    #Hypokalemia  - 3.5 > 3.9 > 4.3>4.3  - improved    #Peripheral Neuropathy  - dysesthesias of feet and atrophy of intrinsic muscles of hands  - unknown etiology  - was on Trileptal at home and now on Cymbalta    #History of Chronic Atrial Fibrillation  - rate controlled. Currently RRR  - Coumadin held prior to admission to rehab secondary to suspicion of SAH, now ruled out  - Resume coumadin home dose 5 mg daily and adjust for INR 2-3  - adjust coumadin according to daily INR  - INR 2.15, Will continue to give Warfarin 4 today and continue to monitor    #History of Myelodysplasia  - stable pancytopenia. Will continue to monitor   - followed by Heme/Onc.   - On Vidaza every 28 days     #Osteoarthritis  - pain meds, modalities, ther-ex    #Chronic Microvascular Cerebrovascular Disease. htn and HLD  / controlled on meds including metoprolol , statin ,     #[place]  - Pocket Talker  - consider outpatient Audiology eval    #Hypothyroidism  - on synthroid    #Pain control:   - rib fracture  - neuropathy pain  - Tylenol prn, Cymbalta    #GI/Bowel Mgmt: Contipation in hospital  - continue bowel meds and monitor, diet --- Diet: Regular with salt tabs    Dvt prophylaxis - On coumadin . pt to follow up with medical doctor dr Davison.          :

## 2022-11-03 DIAGNOSIS — S22.41XD MULTIPLE FRACTURES OF RIBS, RIGHT SIDE, SUBSEQUENT ENCOUNTER FOR FRACTURE WITH ROUTINE HEALING: ICD-10-CM

## 2022-11-03 DIAGNOSIS — F41.9 ANXIETY DISORDER, UNSPECIFIED: ICD-10-CM

## 2022-11-03 DIAGNOSIS — H91.90 UNSPECIFIED HEARING LOSS, UNSPECIFIED EAR: ICD-10-CM
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
## 2022-11-03 DIAGNOSIS — M17.0 BILATERAL PRIMARY OSTEOARTHRITIS OF KNEE: ICD-10-CM

## 2022-11-03 DIAGNOSIS — G62.9 POLYNEUROPATHY, UNSPECIFIED: ICD-10-CM

## 2022-11-03 DIAGNOSIS — R41.89 OTHER SYMPTOMS AND SIGNS INVOLVING COGNITIVE FUNCTIONS AND AWARENESS: ICD-10-CM

## 2022-11-03 DIAGNOSIS — N32.81 OVERACTIVE BLADDER: ICD-10-CM

## 2022-11-03 DIAGNOSIS — I67.82 CEREBRAL ISCHEMIA: ICD-10-CM

## 2022-11-03 DIAGNOSIS — R26.89 OTHER ABNORMALITIES OF GAIT AND MOBILITY: ICD-10-CM

## 2022-11-03 DIAGNOSIS — G93.41 METABOLIC ENCEPHALOPATHY: ICD-10-CM

## 2022-11-03 DIAGNOSIS — G50.0 TRIGEMINAL NEURALGIA: ICD-10-CM

## 2022-11-03 DIAGNOSIS — Z79.890 HORMONE REPLACEMENT THERAPY: ICD-10-CM

## 2022-11-03 DIAGNOSIS — K59.00 CONSTIPATION, UNSPECIFIED: ICD-10-CM

## 2022-11-03 DIAGNOSIS — E83.42 HYPOMAGNESEMIA: ICD-10-CM
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
## 2022-11-03 DIAGNOSIS — E03.9 HYPOTHYROIDISM, UNSPECIFIED: ICD-10-CM

## 2022-11-03 DIAGNOSIS — E87.6 HYPOKALEMIA: ICD-10-CM

## 2022-11-03 DIAGNOSIS — Z79.01 LONG TERM (CURRENT) USE OF ANTICOAGULANTS: ICD-10-CM

## 2022-11-03 DIAGNOSIS — D46.9 MYELODYSPLASTIC SYNDROME, UNSPECIFIED: ICD-10-CM

## 2022-11-03 DIAGNOSIS — E87.1 HYPO-OSMOLALITY AND HYPONATREMIA: ICD-10-CM

## 2022-11-03 DIAGNOSIS — I10 ESSENTIAL (PRIMARY) HYPERTENSION: ICD-10-CM

## 2022-11-03 DIAGNOSIS — I48.20 CHRONIC ATRIAL FIBRILLATION, UNSPECIFIED: ICD-10-CM

## 2022-11-09 PROBLEM — G62.9 POLYNEUROPATHY, UNSPECIFIED: Chronic | Status: ACTIVE | Noted: 2022-10-20

## 2022-11-16 ENCOUNTER — APPOINTMENT (OUTPATIENT)
Dept: NEUROSURGERY | Facility: CLINIC | Age: 87
End: 2022-11-16

## 2022-11-16 VITALS — BODY MASS INDEX: 24.8 KG/M2 | HEIGHT: 63 IN | WEIGHT: 140 LBS

## 2022-11-16 DIAGNOSIS — Z87.39 PERSONAL HISTORY OF OTHER DISEASES OF THE MUSCULOSKELETAL SYSTEM AND CONNECTIVE TISSUE: ICD-10-CM

## 2022-11-16 DIAGNOSIS — Z63.4 DISAPPEARANCE AND DEATH OF FAMILY MEMBER: ICD-10-CM

## 2022-11-16 DIAGNOSIS — Z86.79 PERSONAL HISTORY OF OTHER DISEASES OF THE CIRCULATORY SYSTEM: ICD-10-CM

## 2022-11-16 DIAGNOSIS — G50.0 TRIGEMINAL NEURALGIA: ICD-10-CM

## 2022-11-16 DIAGNOSIS — Z78.9 OTHER SPECIFIED HEALTH STATUS: ICD-10-CM

## 2022-11-16 PROCEDURE — 99203 OFFICE O/P NEW LOW 30 MIN: CPT

## 2022-11-16 SDOH — SOCIAL STABILITY - SOCIAL INSECURITY: DISSAPEARANCE AND DEATH OF FAMILY MEMBER: Z63.4

## 2022-11-18 PROBLEM — Z87.39 HISTORY OF ARTHRITIS: Status: RESOLVED | Noted: 2022-11-16 | Resolved: 2022-11-18

## 2022-11-18 PROBLEM — Z63.4 WIDOWED: Status: ACTIVE | Noted: 2022-11-16

## 2022-11-18 PROBLEM — Z86.79 HISTORY OF HYPERTENSION: Status: RESOLVED | Noted: 2022-11-16 | Resolved: 2022-11-18

## 2022-11-18 PROBLEM — Z78.9 NON-SMOKER: Status: ACTIVE | Noted: 2022-11-16

## 2022-11-18 RX ORDER — HYDROCHLOROTHIAZIDE 12.5 MG/1
12.5 CAPSULE ORAL
Qty: 90 | Refills: 0 | Status: ACTIVE | COMMUNITY
Start: 2022-09-18

## 2022-11-18 RX ORDER — AZACITIDINE FOR 100 MG/1
100 INJECTION, POWDER, LYOPHILIZED, FOR SOLUTION INTRAVENOUS; SUBCUTANEOUS
Qty: 10 | Refills: 0 | Status: ACTIVE | COMMUNITY
Start: 2022-04-13

## 2022-11-18 RX ORDER — ATORVASTATIN CALCIUM 10 MG/1
10 TABLET, FILM COATED ORAL
Qty: 30 | Refills: 0 | Status: ACTIVE | COMMUNITY
Start: 2022-11-02

## 2022-11-18 RX ORDER — ALPRAZOLAM 0.5 MG/1
0.5 TABLET ORAL
Qty: 60 | Refills: 0 | Status: ACTIVE | COMMUNITY
Start: 2022-09-25

## 2022-11-18 RX ORDER — LEVOTHYROXINE SODIUM 0.05 MG/1
50 TABLET ORAL
Qty: 90 | Refills: 0 | Status: ACTIVE | COMMUNITY
Start: 2022-09-18

## 2022-11-18 RX ORDER — LOSARTAN POTASSIUM 100 MG/1
100 TABLET, FILM COATED ORAL
Qty: 30 | Refills: 0 | Status: ACTIVE | COMMUNITY
Start: 2022-11-02

## 2022-11-18 RX ORDER — DULOXETINE HYDROCHLORIDE 30 MG/1
30 CAPSULE, DELAYED RELEASE PELLETS ORAL
Qty: 60 | Refills: 0 | Status: ACTIVE | COMMUNITY
Start: 2022-11-02

## 2022-11-18 RX ORDER — TRAMADOL HYDROCHLORIDE 50 MG/1
50 TABLET, COATED ORAL
Qty: 30 | Refills: 0 | Status: ACTIVE | COMMUNITY
Start: 2022-06-06

## 2022-11-18 RX ORDER — MAGNESIUM GLUCONATE 27 MG(500)
500 TABLET ORAL
Qty: 60 | Refills: 0 | Status: ACTIVE | COMMUNITY
Start: 2022-08-10

## 2022-11-18 RX ORDER — METOPROLOL SUCCINATE 25 MG/1
25 TABLET, EXTENDED RELEASE ORAL
Qty: 90 | Refills: 0 | Status: ACTIVE | COMMUNITY
Start: 2022-09-18

## 2022-11-21 NOTE — PHYSICAL EXAM
[General Appearance - Alert] : alert [General Appearance - In No Acute Distress] : in no acute distress [Oriented To Time, Place, And Person] : oriented to person, place, and time [Person] : oriented to person [Place] : oriented to place [Time] : oriented to time [Short Term Intact] : short term memory intact [Cranial Nerves Optic (II)] : visual acuity intact bilaterally,  pupils equal round and reactive to light [Cranial Nerves Oculomotor (III)] : extraocular motion intact [Cranial Nerves Trigeminal (V)] : facial sensation intact symmetrically [Cranial Nerves Facial (VII)] : face symmetrical [Cranial Nerves Vestibulocochlear (VIII)] : hearing was intact bilaterally [Cranial Nerves Glossopharyngeal (IX)] : tongue and palate midline [Cranial Nerves Accessory (XI - Cranial And Spinal)] : head turning and shoulder shrug symmetric [Cranial Nerves Hypoglossal (XII)] : there was no tongue deviation with protrusion [Motor Strength] : muscle strength was normal in all four extremities [Involuntary Movements] : no involuntary movements were seen [Extraocular Movements] : extraocular movements were intact [FreeTextEntry5] : V1-V3 intact, no pronating drift

## 2022-11-21 NOTE — HISTORY OF PRESENT ILLNESS
[de-identified] : 87-year-old pleasant female presents to the neurosurgery office today alongside her son as a new patient for evaluation of trigeminal neuralgia as a hospital follow-up. \par She has a PMHx HTN, HLD, A-Fib (on Coumadin 5mg daily), hypothyroid, neuropathy of B/L LE, Trigeminal Neuralgia, osteoarthritis of the knees, prior CCY.\par \par PMD Morgan Saman. \par \par She was admitted to [facility] on 10/20/2022 after suffering a fall. Patient was hyponatremia, 119 and was discharged at 133, being followed up with her PMD. \par \par Ms. Al would like to discuss today management options for her trigeminal neuralgia, left sided facial electric shooting pain. \par She was diagnosed 10-15 years ago and received Gamma Knife treatment in 2011 at [facility] with Dr. Thomas who has now passed away since then. \par The patient states that she got minimal relief from the procedure. \par She was taking Trileptal up until hospital admission but then was instructed to stop because of concerns of the hyponatremia. \par It was deemed today that the management of the Trileptal must come from either PMD or neurology, but that she may trial another round of Gamma Knife with Dr. Delaney and \par Dr. Valle. \par \par The patient at this moment is denying any pain, but has been off of the Trileptal now. \par Daughter, who is a nurse, was called during the visit as per the patient's wishes (Rea [phone]) who agreed with the plan of scheduling a telehealth with Dr. Valle in the event that the patient decides to proceed with a second round of Gamma Knife in the near future. \par Until then, she will seek the advice of her PMD to find out if she can resume the Trileptal related to her serum Sodium levels. \par \par

## 2022-11-21 NOTE — END OF VISIT
[FreeTextEntry3] : I personally reviewed this patient and agree with the findings as documented in the note above.

## 2022-12-19 ENCOUNTER — APPOINTMENT (OUTPATIENT)
Dept: OTOLARYNGOLOGY | Facility: CLINIC | Age: 87
End: 2022-12-19

## 2023-01-18 NOTE — DISCHARGE NOTE PROVIDER - ATTENDING ATTESTATION STATEMENT
Improving- Reviewed home blood sugar readings- Continue checking blood sugars at home. Patient tolerated Metformin well without side effects. I feel the benefits of the medication outweigh the risks.  Patient was having diarrhea from Metformin but has now improved to 2 times a day.     I have personally seen and examined the patient. I have collaborated with and supervised the

## 2023-02-14 ENCOUNTER — APPOINTMENT (OUTPATIENT)
Age: 88
End: 2023-02-14

## 2023-02-14 ENCOUNTER — APPOINTMENT (OUTPATIENT)
Dept: NEPHROLOGY | Facility: CLINIC | Age: 88
End: 2023-02-14

## 2023-10-05 ENCOUNTER — APPOINTMENT (OUTPATIENT)
Dept: NEUROLOGY | Facility: CLINIC | Age: 88
End: 2023-10-05

## 2023-10-22 ENCOUNTER — EMERGENCY (EMERGENCY)
Facility: HOSPITAL | Age: 88
LOS: 0 days | Discharge: HOME | End: 2023-10-23
Attending: STUDENT IN AN ORGANIZED HEALTH CARE EDUCATION/TRAINING PROGRAM
Payer: MEDICARE

## 2023-10-22 VITALS
HEART RATE: 114 BPM | WEIGHT: 130.07 LBS | TEMPERATURE: 98 F | DIASTOLIC BLOOD PRESSURE: 78 MMHG | SYSTOLIC BLOOD PRESSURE: 169 MMHG | RESPIRATION RATE: 20 BRPM | OXYGEN SATURATION: 99 %

## 2023-10-22 DIAGNOSIS — E87.1 HYPO-OSMOLALITY AND HYPONATREMIA: ICD-10-CM

## 2023-10-22 DIAGNOSIS — E78.5 HYPERLIPIDEMIA, UNSPECIFIED: ICD-10-CM

## 2023-10-22 DIAGNOSIS — E78.1 PURE HYPERGLYCERIDEMIA: ICD-10-CM

## 2023-10-22 DIAGNOSIS — I10 ESSENTIAL (PRIMARY) HYPERTENSION: ICD-10-CM

## 2023-10-22 DIAGNOSIS — E03.9 HYPOTHYROIDISM, UNSPECIFIED: ICD-10-CM

## 2023-10-22 DIAGNOSIS — G50.0 TRIGEMINAL NEURALGIA: ICD-10-CM

## 2023-10-22 DIAGNOSIS — M19.90 UNSPECIFIED OSTEOARTHRITIS, UNSPECIFIED SITE: ICD-10-CM

## 2023-10-22 DIAGNOSIS — Z90.49 ACQUIRED ABSENCE OF OTHER SPECIFIED PARTS OF DIGESTIVE TRACT: Chronic | ICD-10-CM

## 2023-10-22 DIAGNOSIS — R51.9 HEADACHE, UNSPECIFIED: ICD-10-CM

## 2023-10-22 DIAGNOSIS — Z98.890 OTHER SPECIFIED POSTPROCEDURAL STATES: Chronic | ICD-10-CM

## 2023-10-22 DIAGNOSIS — Z79.01 LONG TERM (CURRENT) USE OF ANTICOAGULANTS: ICD-10-CM

## 2023-10-22 DIAGNOSIS — I48.91 UNSPECIFIED ATRIAL FIBRILLATION: ICD-10-CM

## 2023-10-22 DIAGNOSIS — G62.9 POLYNEUROPATHY, UNSPECIFIED: ICD-10-CM

## 2023-10-22 DIAGNOSIS — Z88.8 ALLERGY STATUS TO OTHER DRUGS, MEDICAMENTS AND BIOLOGICAL SUBSTANCES: ICD-10-CM

## 2023-10-22 LAB
HCT VFR BLD CALC: 31 % — LOW (ref 37–47)
HCT VFR BLD CALC: 31 % — LOW (ref 37–47)
HGB BLD-MCNC: 10.1 G/DL — LOW (ref 12–16)
HGB BLD-MCNC: 10.1 G/DL — LOW (ref 12–16)
MCHC RBC-ENTMCNC: 31.2 PG — HIGH (ref 27–31)
MCHC RBC-ENTMCNC: 31.2 PG — HIGH (ref 27–31)
MCHC RBC-ENTMCNC: 32.6 G/DL — SIGNIFICANT CHANGE UP (ref 32–37)
MCHC RBC-ENTMCNC: 32.6 G/DL — SIGNIFICANT CHANGE UP (ref 32–37)
MCV RBC AUTO: 95.7 FL — SIGNIFICANT CHANGE UP (ref 81–99)
MCV RBC AUTO: 95.7 FL — SIGNIFICANT CHANGE UP (ref 81–99)
NRBC # BLD: 0 /100 WBCS — SIGNIFICANT CHANGE UP (ref 0–0)
NRBC # BLD: 0 /100 WBCS — SIGNIFICANT CHANGE UP (ref 0–0)
PLATELET # BLD AUTO: 139 K/UL — SIGNIFICANT CHANGE UP (ref 130–400)
PLATELET # BLD AUTO: 139 K/UL — SIGNIFICANT CHANGE UP (ref 130–400)
PMV BLD: 10.2 FL — SIGNIFICANT CHANGE UP (ref 7.4–10.4)
PMV BLD: 10.2 FL — SIGNIFICANT CHANGE UP (ref 7.4–10.4)
RBC # BLD: 3.24 M/UL — LOW (ref 4.2–5.4)
RBC # BLD: 3.24 M/UL — LOW (ref 4.2–5.4)
RBC # FLD: 15.5 % — HIGH (ref 11.5–14.5)
RBC # FLD: 15.5 % — HIGH (ref 11.5–14.5)
WBC # BLD: 3.45 K/UL — LOW (ref 4.8–10.8)
WBC # BLD: 3.45 K/UL — LOW (ref 4.8–10.8)
WBC # FLD AUTO: 3.45 K/UL — LOW (ref 4.8–10.8)
WBC # FLD AUTO: 3.45 K/UL — LOW (ref 4.8–10.8)

## 2023-10-22 PROCEDURE — 80053 COMPREHEN METABOLIC PANEL: CPT

## 2023-10-22 PROCEDURE — 36415 COLL VENOUS BLD VENIPUNCTURE: CPT

## 2023-10-22 PROCEDURE — 99284 EMERGENCY DEPT VISIT MOD MDM: CPT

## 2023-10-22 PROCEDURE — 85027 COMPLETE CBC AUTOMATED: CPT

## 2023-10-22 NOTE — ED ADULT TRIAGE NOTE - CHIEF COMPLAINT QUOTE
Pt with hx of facial neuroglia, on Trileptal but stopped it 2 days ago because it was not working, facial pain got worse today.

## 2023-10-23 VITALS — HEART RATE: 96 BPM

## 2023-10-23 LAB
ALBUMIN SERPL ELPH-MCNC: 3.6 G/DL — SIGNIFICANT CHANGE UP (ref 3.5–5.2)
ALBUMIN SERPL ELPH-MCNC: 3.6 G/DL — SIGNIFICANT CHANGE UP (ref 3.5–5.2)
ALP SERPL-CCNC: 85 U/L — SIGNIFICANT CHANGE UP (ref 30–115)
ALP SERPL-CCNC: 85 U/L — SIGNIFICANT CHANGE UP (ref 30–115)
ALT FLD-CCNC: 9 U/L — SIGNIFICANT CHANGE UP (ref 0–41)
ALT FLD-CCNC: 9 U/L — SIGNIFICANT CHANGE UP (ref 0–41)
ANION GAP SERPL CALC-SCNC: 11 MMOL/L — SIGNIFICANT CHANGE UP (ref 7–14)
ANION GAP SERPL CALC-SCNC: 11 MMOL/L — SIGNIFICANT CHANGE UP (ref 7–14)
AST SERPL-CCNC: 15 U/L — SIGNIFICANT CHANGE UP (ref 0–41)
AST SERPL-CCNC: 15 U/L — SIGNIFICANT CHANGE UP (ref 0–41)
BILIRUB SERPL-MCNC: 0.6 MG/DL — SIGNIFICANT CHANGE UP (ref 0.2–1.2)
BILIRUB SERPL-MCNC: 0.6 MG/DL — SIGNIFICANT CHANGE UP (ref 0.2–1.2)
BUN SERPL-MCNC: 15 MG/DL — SIGNIFICANT CHANGE UP (ref 10–20)
BUN SERPL-MCNC: 15 MG/DL — SIGNIFICANT CHANGE UP (ref 10–20)
CALCIUM SERPL-MCNC: 8.2 MG/DL — LOW (ref 8.4–10.5)
CALCIUM SERPL-MCNC: 8.2 MG/DL — LOW (ref 8.4–10.5)
CHLORIDE SERPL-SCNC: 107 MMOL/L — SIGNIFICANT CHANGE UP (ref 98–110)
CHLORIDE SERPL-SCNC: 107 MMOL/L — SIGNIFICANT CHANGE UP (ref 98–110)
CO2 SERPL-SCNC: 23 MMOL/L — SIGNIFICANT CHANGE UP (ref 17–32)
CO2 SERPL-SCNC: 23 MMOL/L — SIGNIFICANT CHANGE UP (ref 17–32)
CREAT SERPL-MCNC: 0.7 MG/DL — SIGNIFICANT CHANGE UP (ref 0.7–1.5)
CREAT SERPL-MCNC: 0.7 MG/DL — SIGNIFICANT CHANGE UP (ref 0.7–1.5)
EGFR: 84 ML/MIN/1.73M2 — SIGNIFICANT CHANGE UP
EGFR: 84 ML/MIN/1.73M2 — SIGNIFICANT CHANGE UP
GLUCOSE SERPL-MCNC: 114 MG/DL — HIGH (ref 70–99)
GLUCOSE SERPL-MCNC: 114 MG/DL — HIGH (ref 70–99)
POTASSIUM SERPL-MCNC: 3.9 MMOL/L — SIGNIFICANT CHANGE UP (ref 3.5–5)
POTASSIUM SERPL-MCNC: 3.9 MMOL/L — SIGNIFICANT CHANGE UP (ref 3.5–5)
POTASSIUM SERPL-SCNC: 3.9 MMOL/L — SIGNIFICANT CHANGE UP (ref 3.5–5)
POTASSIUM SERPL-SCNC: 3.9 MMOL/L — SIGNIFICANT CHANGE UP (ref 3.5–5)
PROT SERPL-MCNC: 5.9 G/DL — LOW (ref 6–8)
PROT SERPL-MCNC: 5.9 G/DL — LOW (ref 6–8)
SODIUM SERPL-SCNC: 141 MMOL/L — SIGNIFICANT CHANGE UP (ref 135–146)
SODIUM SERPL-SCNC: 141 MMOL/L — SIGNIFICANT CHANGE UP (ref 135–146)

## 2023-10-23 NOTE — ED PROVIDER NOTE - PROGRESS NOTE DETAILS
discussed case with neurology, evaluated patient, no acute recommendations/interventions at this time. patient to continue home meds and fu with neurosurgery tomorrow. strict return precautions discussed.

## 2023-10-23 NOTE — ED PROVIDER NOTE - NSFOLLOWUPCLINICS_GEN_ALL_ED_FT
[facility]  Neurology  [address] [place] [address], [facility] [address]  [place] [address]  Phone: [phone]  Fax:   Follow Up Time: 1-3 Days

## 2023-10-23 NOTE — ED PROVIDER NOTE - CLINICAL SUMMARY MEDICAL DECISION MAKING FREE TEXT BOX
88-year-old female presenting today with symptomatology consistent with trigeminal neuralgia.  Patient was evaluated by neurology at bedside.  They recommended patient to follow-up outpatient with her neurosurgeon and neurologist.  Patient will continue taking her medications as directed.  Patient has improvement in symptomatology during ED stay and will be followed up with outpatient.  Return precautions explained to patient.

## 2023-10-23 NOTE — ED PROVIDER NOTE - PHYSICAL EXAMINATION
CONSTITUTIONAL: elderly appearing female, nad   SKIN: skin exam is warm and dry  HEAD: Normocephalic; atraumatic  EYES: PERRL, EOM intact; conjunctiva and sclera clear.  ENT: MMM  NECK: ROM intact.   EXT: Normal ROM.   NEURO: awake, alert, following commands, oriented, grossly unremarkable. No Focal deficits. GCS 15. +hypersensation to L cheek.  PSYCH: Cooperative, appropriate.

## 2023-10-23 NOTE — CONSULT NOTE ADULT - ASSESSMENT
88F. PMH: Trigeminal Neuralgia (F/w NRSG Dr. Delaney), neuropathy B/L LE, HTN, HLD, A-Fib (on Coumadin 5mg daily), hypothyroid, osteoarthritis of the knees, prior CCY  Presented 10/22 c/o facial pain consistent with hx of trigeminal neuralgia worsening from baseline. Pt was taking trileptal 1/2 tab 75mg BID, but increasing x1 week to 1/2 tab QID as pain was not controlled. She self-dc'd 10/20 2/ lack of efficacy. Currently she reports pain is 2/10, but escalates to 10/10 during episodes in the lower 2/3s of her face.   She is s/p gamma knife tx (2015, [place]). Pain was well controlled on trileptal until 10/2022, pt had dose reduced due to hyponatremia and she has had intermittent progressively worsening episodes. She followed with Dr. Delaney (last seen 11/2022), considered restarting trileptal and repeat gamma knife tx at that time. Now following with Dr. Thomas (neurosurgery), has appointment 10/25/23 to discuss further surgical options.     Previous Imaging (10/2022):   MR BRAIN: No acute intracranial pathology or abnormal enhancement. Previously described trace subarachnoid hemorrhage in the right quadrigeminal plate cistern likely represents vessel calcification. Moderate chronic microvascular type changes.  BRAIN MRA: No evidence of flow-limiting stenosis, aneurysm or vessel malformation.  BRAIN MRV: No dural venous sinus thrombosis or stenosis.    Recommendations:   - Pt currently not in acute pain, last seen by Dr. Delaney 11/2022 for TN, has outpatient follow up with Dr. Thomas (neurosurgery) 10/25/23 to discuss further surgical options.   - Pt has tried gabapentin (dc'd due to lack of efficacy), carbamazepine (dc'd due to side effects)  - Sodium currently WNL    ***Recommendations pending final review with Attending Physician*** 88F. PMH: Trigeminal Neuralgia (F/w NRSG Dr. Delaney), neuropathy B/L LE, HTN, HLD, A-Fib (on Coumadin 5mg daily), hypothyroid, osteoarthritis of the knees, prior CCY  Presented 10/22 c/o facial pain consistent with hx of trigeminal neuralgia worsening from baseline. Pt was taking trileptal 1/2 tab 75mg BID, but increasing x1 week to 1/2 tab QID as pain was not controlled. She self-dc'd 10/20 2/ lack of efficacy. Currently she reports pain is 2/10, but escalates to 10/10 during episodes in the lower 2/3s of her face.   She is s/p gamma knife tx (2015, [place]). Pain was well controlled on trileptal until 10/2022, pt had dose reduced due to hyponatremia and she has had intermittent progressively worsening episodes. She followed with Dr. Delaney (last seen 11/2022), considered restarting trileptal and repeat gamma knife tx at that time. Now following with Dr. Thomas (neurosurgery), has appointment 10/25/23 to discuss further surgical options.     Previous Imaging (10/2022):   MR BRAIN: No acute intracranial pathology or abnormal enhancement. Previously described trace subarachnoid hemorrhage in the right quadrigeminal plate cistern likely represents vessel calcification. Moderate chronic microvascular type changes.  BRAIN MRA: No evidence of flow-limiting stenosis, aneurysm or vessel malformation.  BRAIN MRV: No dural venous sinus thrombosis or stenosis.    Recommendations:   - Pt currently not in acute pain, last seen by Dr. Delaney 11/2022 for TN, has outpatient follow up with Dr. Thomas (neurosurgery) 10/25/23 to discuss further surgical options.   - Pt has tried gabapentin (dc'd due to lack of efficacy), carbamazepine (dc'd due to side effects)  - Sodium currently WNL  - Can be discharged on home trileptal, tramadol, duloxetine

## 2023-10-23 NOTE — ED PROVIDER NOTE - CARE PROVIDER_API CALL
Morgan Davison .  Internal Medicine  [address] [place] [address]  Phone: [phone]  Fax: [phone]  Follow Up Time: 1-3 Days

## 2023-10-23 NOTE — CONSULT NOTE ADULT - SUBJECTIVE AND OBJECTIVE BOX
Neurology Progress Note     JANA ESCOBEDO 88y Female [id number]    Hospital Day: 1d     88F. PMH: Trigeminal Neuralgia (F/w NRSG Dr. Delaney), neuropathy B/L LE, HTN, HLD, A-Fib (on Coumadin 5mg daily), hypothyroid, osteoarthritis of the knees, prior CCY  Presented 10/22 c/o facial pain consistent with hx of trigeminal neuralgia worsening from baseline. Pt was taking trileptal 1/2 tab 75mg BID, but increasing x1 week to 1/2 tab QID as pain was not controlled. She self-dc'd 10/20 2/ lack of efficacy. Currently she reports pain is 2/10, but escalates to 10/10 during episodes in the lower 2/3s of her face.   She is s/p gamma knife tx (2015, [place]). Pain was well controlled on trileptal until 10/2022, pt had dose reduced due to hyponatremia and she has had intermittent progressively worsening episodes. She followed with Dr. Delaney (last seen 11/2022), considered restarting trileptal and repeat gamma knife tx at that time. Now following with Dr. Thomas (neurosurgery), has appointment 10/25/23 to discuss further surgical options.      Pt has tried gabapentin (dc'd due to lack of efficacy), carbamazepine (dc'd due to side effects)    Previous Imaging (10/2022):   MR BRAIN: No acute intracranial pathology or abnormal enhancement. Previously described trace subarachnoid hemorrhage in the right quadrigeminal plate cistern likely represents vessel calcification. Moderate chronic microvascular type changes.  BRAIN MRA: No evidence of flow-limiting stenosis, aneurysm or vessel malformation.  BRAIN MRV: No dural venous sinus thrombosis or stenosis.    Vital Signs  T(F): 98.4 (22:36), Max: 98.4 (22:36)  HR: 114 (22:36) (114 - 114)  BP: 169/78 (22:36) (169/78 - 169/78)  RR: 20 (22:36) (20 - 20)  SpO2: 99% (22:36) (99% - 99%)    Neurological Exam:   Mental status: Awake, alert and oriented x3. Naming, repetition and comprehension intact.  Attention/concentration intact.  No dysarthria, no aphasia.  Fund of knowledge appropriate.    Cranial nerves: Pupils equally round and reactive to light, visual fields full, no nystagmus, extraocular muscles intact, V1 through V3 intact bilaterally and symmetric, face symmetric, hearing intact to finger rub, tongue was midline.  Motor:  MRC grading 5/5 b/l UE/LE.   strength 5/5.  Normal tone and bulk.  No abnormal movements.    Sensation: Intact to light touch   Coordination: No dysmetria on finger-to-nose    Gait: Deferred      Labs:  WBC 3.45 /HGB 10.1 /MCV 95.7 /HCT 31.0 / / 10-22    10-22    141  |  107  |  15  ----------------------------<  114<H>  3.9   |  23  |  0.7    Ca    8.2<L>      22 Oct 2023 23:25    TPro  5.9<L>  /  Alb  3.6  /  TBili  0.6  /  DBili  x   /  AST  15  /  ALT  9   /  AlkPhos  85  10-22    LIVER FUNCTIONS - ( 22 Oct 2023 23:25 )  Alb: 3.6 g/dL / Pro: 5.9 g/dL / ALK PHOS: 85 U/L / ALT: 9 U/L / AST: 15 U/L / GGT: x             Urinalysis Basic - ( 22 Oct 2023 23:25 )    Color: x / Appearance: x / SG: x / pH: x  Gluc: 114 mg/dL / Ketone: x  / Bili: x / Urobili: x   Blood: x / Protein: x / Nitrite: x   Leuk Esterase: x / RBC: x / WBC x   Sq Epi: x / Non Sq Epi: x / Bacteria: x        Medications:

## 2023-10-23 NOTE — ED ADULT NURSE NOTE - NSFALLRISKINTERV_ED_ALL_ED

## 2023-10-23 NOTE — ED PROVIDER NOTE - PATIENT PORTAL LINK FT
You can access the FollowMyHealth Patient Portal offered by [facility] by registering at the following website: [email or web address]. By joining [employer]’s FollowMyHealth portal, you will also be able to view your health information using other applications (apps) compatible with our system.

## 2023-10-23 NOTE — ED PROVIDER NOTE - OBJECTIVE STATEMENT
88 year old female, past medical history trigeminal neuralgia, afib on coumadin, hypothyroidism, neuropathy b/l le, b/l oa, htn, hdl, who presents with left sided facial pain. patient on trileptal for trigeminal neuralgia complicated by hyponatremia. patient recent increased dose trileptal BID, stopped med x2 days ago. denies f/c, sore throat, vision changes, neck pain, nausea/vomiting. patient has scheduled appt with neurosurgery at [facility] in 2 days.

## 2024-03-18 NOTE — PHYSICAL THERAPY INITIAL EVALUATION ADULT - ADDITIONAL COMMENTS
Rx Refill Note  Fax refill request received from pharmacy.  Appointment with Maribel 4/18/24.  Requested Prescriptions      No prescriptions requested or ordered in this encounter      Last office visit with prescribing clinician: 3/15/2023   Last telemedicine visit with prescribing clinician: Visit date not found   Next office visit with prescribing clinician: 4/18/2024                         Would you like a call back once the refill request has been completed: [] Yes [] No    If the office needs to give you a call back, can they leave a voicemail: [] Yes [] No    Suzanne Tong MA  03/18/24, 08:38 EDTRx Refill Note  Requested Prescriptions      No prescriptions requested or ordered in this encounter      Last office visit with prescribing clinician: 3/15/2023   Last telemedicine visit with prescribing clinician: Visit date not found   Next office visit with prescribing clinician: 4/18/2024                         Would you like a call back once the refill request has been completed: [] Yes [] No    If the office needs to give you a call back, can they leave a voicemail: [] Yes [] No    Suzanne Tong MA  03/18/24, 08:38 EDT   Pt lives with daughter with 8 ISAAC in house.

## 2025-06-09 NOTE — DISCHARGE NOTE PROVIDER - NSDCCPCAREPLAN_GEN_ALL_CORE_FT
1% lidocaine PRINCIPAL DISCHARGE DIAGNOSIS  Diagnosis: Fall  Assessment and Plan of Treatment: Right 7-10 rib fractures  SAH        SECONDARY DISCHARGE DIAGNOSES  Diagnosis: Subarachnoid hemorrhage  Assessment and Plan of Treatment: Neurosurgery Recommendations  Repeat imaging stable  No surgery required at this time  Restart Coumadin on 10/22/22  Please follow up w/ Dr. Alba Chavira in 1-2 weeks.  Neuroendovascular Recommendations  MRI's negative  No surgery required at this time      Diagnosis: Rib fractures  Assessment and Plan of Treatment: Please use incentive spirometry apparatus  Please follow up in [facility] in 2 weeks.    Diagnosis: Trigeminal neuralgia  Assessment and Plan of Treatment: Neurology Recommendations  Discontinue trileptal  Continue cymbalta  Please follow up w/  Dr. Montana Delaney for management of trigeminal neuralgia upon discharge     1% lidocaine